# Patient Record
Sex: FEMALE | Race: WHITE | Employment: FULL TIME | URBAN - METROPOLITAN AREA
[De-identification: names, ages, dates, MRNs, and addresses within clinical notes are randomized per-mention and may not be internally consistent; named-entity substitution may affect disease eponyms.]

---

## 2018-06-05 ENCOUNTER — HOSPITAL ENCOUNTER (OUTPATIENT)
Dept: LAB | Age: 33
Discharge: HOME | End: 2018-06-05
Payer: COMMERCIAL

## 2018-06-05 DIAGNOSIS — O99.013 ANEMIA DURING PREGNANCY IN THIRD TRIMESTER: ICD-10-CM

## 2018-06-05 PROBLEM — Z3A.33 33 WEEKS GESTATION OF PREGNANCY: Status: ACTIVE | Noted: 2018-06-05

## 2018-06-05 PROBLEM — Z98.84 HISTORY OF GASTRIC BYPASS: Status: ACTIVE | Noted: 2018-06-05

## 2018-06-05 PROBLEM — D50.9 IRON DEFICIENCY ANEMIA: Status: ACTIVE | Noted: 2018-06-05

## 2018-06-05 LAB
ALBUMIN SERPL-MCNC: 3 G/DL (ref 3.5–5)
ALBUMIN/GLOB SERPL: 0.7 {RATIO} (ref 1.2–3.5)
ALP SERPL-CCNC: 132 U/L (ref 50–136)
ALT SERPL-CCNC: 23 U/L (ref 12–65)
ANION GAP SERPL CALC-SCNC: 6 MMOL/L (ref 7–16)
APPEARANCE UR: CLEAR
AST SERPL-CCNC: 19 U/L (ref 15–37)
BACTERIA URNS QL MICRO: NORMAL /HPF
BASOPHILS # BLD: 0 K/UL (ref 0–0.2)
BASOPHILS NFR BLD: 0 % (ref 0–2)
BILIRUB SERPL-MCNC: 0.1 MG/DL (ref 0.2–1.1)
BILIRUB UR QL: NEGATIVE
BUN SERPL-MCNC: 12 MG/DL (ref 6–23)
CALCIUM SERPL-MCNC: 8.7 MG/DL (ref 8.3–10.4)
CASTS URNS QL MICRO: 0 /LPF
CEA SERPL-MCNC: 1.8 NG/ML (ref 0–3)
CHLORIDE SERPL-SCNC: 104 MMOL/L (ref 98–107)
CO2 SERPL-SCNC: 25 MMOL/L (ref 21–32)
COLOR UR: YELLOW
CREAT SERPL-MCNC: 0.69 MG/DL (ref 0.6–1)
CRYSTALS URNS QL MICRO: 0 /LPF
DIFFERENTIAL METHOD BLD: ABNORMAL
EOSINOPHIL # BLD: 0.1 K/UL (ref 0–0.8)
EOSINOPHIL NFR BLD: 0 % (ref 0.5–7.8)
EPI CELLS #/AREA URNS HPF: NORMAL /HPF
ERYTHROCYTE [DISTWIDTH] IN BLOOD BY AUTOMATED COUNT: 18 % (ref 11.9–14.6)
ERYTHROCYTE [SEDIMENTATION RATE] IN BLOOD: 49 MM/HR (ref 0–20)
FERRITIN SERPL-MCNC: 6 NG/ML (ref 8–388)
FOLATE SERPL-MCNC: 39.1 NG/ML (ref 3.1–17.5)
GLOBULIN SER CALC-MCNC: 4.6 G/DL (ref 2.3–3.5)
GLUCOSE SERPL-MCNC: 70 MG/DL (ref 65–100)
GLUCOSE UR STRIP.AUTO-MCNC: NEGATIVE MG/DL
HCT VFR BLD AUTO: 32.3 % (ref 35.8–46.3)
HGB BLD-MCNC: 9.9 G/DL (ref 11.7–15.4)
HGB RETIC QN AUTO: 21 PG (ref 29–35)
HGB UR QL STRIP: ABNORMAL
IMM RETICS NFR: 28.2 % (ref 3–15.9)
IRON SATN MFR SERPL: 4 %
IRON SERPL-MCNC: 23 UG/DL (ref 35–150)
KAPPA LC FREE SER-MCNC: 22.49 MG/L (ref 3.3–19.4)
KAPPA LC FREE/LAMBDA FREE SER: 0.4 {RATIO} (ref 0.26–1.65)
KETONES UR QL STRIP.AUTO: NEGATIVE MG/DL
LAMBDA LC FREE SERPL-MCNC: 56.6 MG/L (ref 5.71–26.3)
LDH SERPL L TO P-CCNC: 182 U/L (ref 100–190)
LEUKOCYTE ESTERASE UR QL STRIP.AUTO: ABNORMAL
LYMPHOCYTES # BLD: 2 K/UL (ref 0.5–4.6)
LYMPHOCYTES NFR BLD: 18 % (ref 13–44)
MCH RBC QN AUTO: 22.3 PG (ref 26.1–32.9)
MCHC RBC AUTO-ENTMCNC: 30.7 G/DL (ref 31.4–35)
MCV RBC AUTO: 72.7 FL (ref 79.6–97.8)
MONOCYTES # BLD: 0.6 K/UL (ref 0.1–1.3)
MONOCYTES NFR BLD: 6 % (ref 4–12)
MUCOUS THREADS URNS QL MICRO: 0 /LPF
NEUTS SEG # BLD: 8.7 K/UL (ref 1.7–8.2)
NEUTS SEG NFR BLD: 76 % (ref 43–78)
NITRITE UR QL STRIP.AUTO: NEGATIVE
NRBC # BLD: 0 K/UL (ref 0–0.2)
PH UR STRIP: 7 [PH] (ref 5–9)
PLATELET # BLD AUTO: 332 K/UL (ref 150–450)
PMV BLD AUTO: 9.4 FL (ref 10.8–14.1)
POTASSIUM SERPL-SCNC: 3.8 MMOL/L (ref 3.5–5.1)
PROT SERPL-MCNC: 7.6 G/DL (ref 6.3–8.2)
PROT UR STRIP-MCNC: NEGATIVE MG/DL
RBC # BLD AUTO: 4.44 M/UL (ref 4.05–5.25)
RBC #/AREA URNS HPF: NORMAL /HPF
RETICS # AUTO: 0.08 M/UL (ref 0.03–0.1)
RETICS/RBC NFR AUTO: 1.7 % (ref 0.3–2)
SODIUM SERPL-SCNC: 135 MMOL/L (ref 136–145)
SP GR UR REFRACTOMETRY: 1.01 (ref 1–1.02)
T4 FREE SERPL-MCNC: 0.9 NG/DL (ref 0.78–1.46)
TIBC SERPL-MCNC: 646 UG/DL (ref 250–450)
TSH SERPL DL<=0.005 MIU/L-ACNC: 1.17 UIU/ML (ref 0.36–3.74)
URATE SERPL-MCNC: 4.3 MG/DL (ref 2.6–6)
UROBILINOGEN UR QL STRIP.AUTO: 0.2 EU/DL (ref 0.2–1)
VIT B12 SERPL-MCNC: 493 PG/ML (ref 193–986)
WBC # BLD AUTO: 11.5 K/UL (ref 4.3–11.1)
WBC URNS QL MICRO: NORMAL /HPF

## 2018-06-05 PROCEDURE — 81015 MICROSCOPIC EXAM OF URINE: CPT | Performed by: PEDIATRICS

## 2018-06-05 PROCEDURE — 84439 ASSAY OF FREE THYROXINE: CPT | Performed by: PEDIATRICS

## 2018-06-05 PROCEDURE — 84550 ASSAY OF BLOOD/URIC ACID: CPT | Performed by: PEDIATRICS

## 2018-06-05 PROCEDURE — 84165 PROTEIN E-PHORESIS SERUM: CPT | Performed by: PEDIATRICS

## 2018-06-05 PROCEDURE — 82746 ASSAY OF FOLIC ACID SERUM: CPT | Performed by: PEDIATRICS

## 2018-06-05 PROCEDURE — 85652 RBC SED RATE AUTOMATED: CPT | Performed by: PEDIATRICS

## 2018-06-05 PROCEDURE — 84238 ASSAY NONENDOCRINE RECEPTOR: CPT | Performed by: PEDIATRICS

## 2018-06-05 PROCEDURE — 86334 IMMUNOFIX E-PHORESIS SERUM: CPT | Performed by: PEDIATRICS

## 2018-06-05 PROCEDURE — 84443 ASSAY THYROID STIM HORMONE: CPT | Performed by: PEDIATRICS

## 2018-06-05 PROCEDURE — 36415 COLL VENOUS BLD VENIPUNCTURE: CPT | Performed by: PEDIATRICS

## 2018-06-05 PROCEDURE — 82728 ASSAY OF FERRITIN: CPT | Performed by: PEDIATRICS

## 2018-06-05 PROCEDURE — 83021 HEMOGLOBIN CHROMOTOGRAPHY: CPT | Performed by: PEDIATRICS

## 2018-06-05 PROCEDURE — 83883 ASSAY NEPHELOMETRY NOT SPEC: CPT | Performed by: PEDIATRICS

## 2018-06-05 PROCEDURE — 86038 ANTINUCLEAR ANTIBODIES: CPT | Performed by: PEDIATRICS

## 2018-06-05 PROCEDURE — 82607 VITAMIN B-12: CPT | Performed by: PEDIATRICS

## 2018-06-05 PROCEDURE — 80053 COMPREHEN METABOLIC PANEL: CPT | Performed by: PEDIATRICS

## 2018-06-05 PROCEDURE — 85025 COMPLETE CBC W/AUTO DIFF WBC: CPT | Performed by: PEDIATRICS

## 2018-06-05 PROCEDURE — 81003 URINALYSIS AUTO W/O SCOPE: CPT | Performed by: PEDIATRICS

## 2018-06-05 PROCEDURE — 83615 LACTATE (LD) (LDH) ENZYME: CPT | Performed by: PEDIATRICS

## 2018-06-05 PROCEDURE — 85046 RETICYTE/HGB CONCENTRATE: CPT | Performed by: PEDIATRICS

## 2018-06-05 PROCEDURE — 83540 ASSAY OF IRON: CPT | Performed by: PEDIATRICS

## 2018-06-05 PROCEDURE — 82378 CARCINOEMBRYONIC ANTIGEN: CPT | Performed by: PEDIATRICS

## 2018-06-05 PROCEDURE — 82785 ASSAY OF IGE: CPT | Performed by: PEDIATRICS

## 2018-06-06 LAB
ANA SER QL: NEGATIVE
DEPRECATED HGB OTHER BLD-IMP: 0 %
HGB A MFR BLD: 98.2 % (ref 96.4–98.8)
HGB A2 MFR BLD COLUMN CHROM: 1.8 % (ref 1.8–3.2)
HGB C MFR BLD: 0 %
HGB F MFR BLD: 0 % (ref 0–2)
HGB FRACT BLD-IMP: NORMAL
HGB S BLD QL SOLY: NEGATIVE
HGB S MFR BLD: 0 %
PERIPHERAL SMEAR,PSM: NORMAL
TRANSFERRIN SERPL-SCNC: 63.8 NMOL/L (ref 12.2–27.3)

## 2018-06-07 LAB
ALBUMIN SERPL ELPH-MCNC: 3.35 G/DL (ref 3.2–5.6)
ALBUMIN/GLOB SERPL: 1 {RATIO}
ALPHA1 GLOB SERPL ELPH-MCNC: 0.42 G/DL (ref 0.1–0.4)
ALPHA2 GLOB SERPL ELPH-MCNC: 0.84 G/DL (ref 0.4–1.2)
B-GLOBULIN SERPL QL ELPH: 1.39 G/DL (ref 0.6–1.3)
GAMMA GLOB MFR SERPL ELPH: 0.8 G/DL (ref 0.5–1.6)
IGA SERPL-MCNC: 321 MG/DL (ref 85–499)
IGG SERPL-MCNC: 852 MG/DL (ref 610–1616)
IGM SERPL-MCNC: 129 MG/DL (ref 35–242)
M PROTEIN SERPL ELPH-MCNC: ABNORMAL G/DL
PROT PATTERN SERPL ELPH-IMP: ABNORMAL
PROT PATTERN SPEC IFE-IMP: ABNORMAL
PROT SERPL-MCNC: 6.8 G/DL (ref 6.3–8.2)

## 2018-06-08 ENCOUNTER — HOSPITAL ENCOUNTER (OUTPATIENT)
Dept: INFUSION THERAPY | Age: 33
Discharge: HOME | End: 2018-06-08
Payer: COMMERCIAL

## 2018-06-08 ENCOUNTER — DOCUMENTATION ONLY (OUTPATIENT)
Dept: HEMATOLOGY | Age: 33
End: 2018-06-08

## 2018-06-08 VITALS
HEART RATE: 87 BPM | TEMPERATURE: 98.1 F | DIASTOLIC BLOOD PRESSURE: 88 MMHG | RESPIRATION RATE: 16 BRPM | SYSTOLIC BLOOD PRESSURE: 139 MMHG | OXYGEN SATURATION: 98 %

## 2018-06-08 DIAGNOSIS — Z3A.33 33 WEEKS GESTATION OF PREGNANCY: ICD-10-CM

## 2018-06-08 DIAGNOSIS — D50.9 IRON DEFICIENCY ANEMIA, UNSPECIFIED IRON DEFICIENCY ANEMIA TYPE: ICD-10-CM

## 2018-06-08 LAB — IGE SERPL-ACNC: 24 IU/ML (ref 0–100)

## 2018-06-08 PROCEDURE — 96365 THER/PROPH/DIAG IV INF INIT: CPT

## 2018-06-08 PROCEDURE — 74011250636 HC RX REV CODE- 250/636: Performed by: PEDIATRICS

## 2018-06-08 RX ADMIN — SODIUM CHLORIDE 500 ML: 900 INJECTION, SOLUTION INTRAVENOUS at 14:26

## 2018-06-08 RX ADMIN — FERRIC CARBOXYMALTOSE INJECTION 750 MG: 50 INJECTION, SOLUTION INTRAVENOUS at 14:28

## 2018-06-08 NOTE — PROGRESS NOTES
Arrived to the [facility]. injectafer completed. Patient tolerated well. Any issues or concerns during appointment: no.  Patient aware of next infusion appointment on 6/15 (date) at 2 (time). Discharged home.

## 2018-06-08 NOTE — PROGRESS NOTES
I spoke with Ms. Poppy Cobb regarding the [facility] [employer].  I went over the benefits of the program, the enrollment process, and back end billing. She agreed to allow me to sign her up for the program, and she signed the LPOA document allowing a [address] [place] [address] representative to do just that enrollment. LPOA will be scanned into chart along with the enrollment information.

## 2018-06-15 ENCOUNTER — HOSPITAL ENCOUNTER (OUTPATIENT)
Dept: INFUSION THERAPY | Age: 33
Discharge: HOME | End: 2018-06-15
Payer: COMMERCIAL

## 2018-06-15 VITALS
RESPIRATION RATE: 18 BRPM | TEMPERATURE: 97.6 F | WEIGHT: 202.2 LBS | DIASTOLIC BLOOD PRESSURE: 77 MMHG | HEART RATE: 84 BPM | BODY MASS INDEX: 32.64 KG/M2 | OXYGEN SATURATION: 97 % | SYSTOLIC BLOOD PRESSURE: 125 MMHG

## 2018-06-15 DIAGNOSIS — Z3A.33 33 WEEKS GESTATION OF PREGNANCY: ICD-10-CM

## 2018-06-15 DIAGNOSIS — D50.9 IRON DEFICIENCY ANEMIA, UNSPECIFIED IRON DEFICIENCY ANEMIA TYPE: ICD-10-CM

## 2018-06-15 PROCEDURE — 74011250636 HC RX REV CODE- 250/636: Performed by: PEDIATRICS

## 2018-06-15 PROCEDURE — 96365 THER/PROPH/DIAG IV INF INIT: CPT

## 2018-06-15 RX ORDER — SODIUM CHLORIDE 0.9 % (FLUSH) 0.9 %
10 SYRINGE (ML) INJECTION AS NEEDED
Status: ACTIVE | OUTPATIENT
Start: 2018-06-15 | End: 2018-06-16

## 2018-06-15 RX ADMIN — SODIUM CHLORIDE 500 ML: 900 INJECTION, SOLUTION INTRAVENOUS at 15:10

## 2018-06-15 RX ADMIN — FERRIC CARBOXYMALTOSE INJECTION 750 MG: 50 INJECTION, SOLUTION INTRAVENOUS at 14:48

## 2018-06-15 RX ADMIN — Medication 10 ML: at 15:38

## 2018-06-15 NOTE — PROGRESS NOTES
Arrived to the [facility]. Injectafer completed. Patient tolerated well. Any issues or concerns during appointment: none. Given educational information sheet on Injectafer and Preeclampsia. Recommended monitoring BP at home. Patient aware of next MD appointment on 10/18/18 at [phone]. Discharged ambulatory.

## 2018-06-23 ENCOUNTER — HOSPITAL ENCOUNTER (EMERGENCY)
Age: 33
Discharge: HOME | End: 2018-06-24
Attending: OBSTETRICS & GYNECOLOGY | Admitting: OBSTETRICS & GYNECOLOGY
Payer: COMMERCIAL

## 2018-06-23 DIAGNOSIS — F41.9 ANXIETY: ICD-10-CM

## 2018-06-23 DIAGNOSIS — G44.201 INTRACTABLE TENSION-TYPE HEADACHE, UNSPECIFIED CHRONICITY PATTERN: Primary | ICD-10-CM

## 2018-06-23 PROBLEM — O16.3 HYPERTENSION AFFECTING PREGNANCY IN THIRD TRIMESTER: Status: ACTIVE | Noted: 2018-06-23

## 2018-06-23 LAB
ALBUMIN SERPL-MCNC: 2.6 G/DL (ref 3.5–5)
ALBUMIN/GLOB SERPL: 0.7 {RATIO} (ref 1.2–3.5)
ALP SERPL-CCNC: 138 U/L (ref 50–136)
ALT SERPL-CCNC: 30 U/L (ref 12–65)
ANION GAP SERPL CALC-SCNC: 12 MMOL/L (ref 7–16)
AST SERPL-CCNC: 26 U/L (ref 15–37)
BASOPHILS # BLD: 0 K/UL (ref 0–0.2)
BASOPHILS NFR BLD: 0 % (ref 0–2)
BILIRUB SERPL-MCNC: 0.2 MG/DL (ref 0.2–1.1)
BUN SERPL-MCNC: 8 MG/DL (ref 6–23)
CALCIUM SERPL-MCNC: 8 MG/DL (ref 8.3–10.4)
CHLORIDE SERPL-SCNC: 104 MMOL/L (ref 98–107)
CO2 SERPL-SCNC: 20 MMOL/L (ref 21–32)
CREAT SERPL-MCNC: 0.66 MG/DL (ref 0.6–1)
CREAT UR-MCNC: 63.63 MG/DL
DIFFERENTIAL METHOD BLD: ABNORMAL
EOSINOPHIL # BLD: 0 K/UL (ref 0–0.8)
EOSINOPHIL NFR BLD: 0 % (ref 0.5–7.8)
ERYTHROCYTE [DISTWIDTH] IN BLOOD BY AUTOMATED COUNT: 27.1 % (ref 11.9–14.6)
GLOBULIN SER CALC-MCNC: 3.6 G/DL (ref 2.3–3.5)
GLUCOSE SERPL-MCNC: 80 MG/DL (ref 65–100)
HCT VFR BLD AUTO: 33.3 % (ref 35.8–46.3)
HGB BLD-MCNC: 10.4 G/DL (ref 11.7–15.4)
IMM GRANULOCYTES # BLD: 0 K/UL (ref 0–0.5)
IMM GRANULOCYTES NFR BLD AUTO: 0 % (ref 0–5)
LDH SERPL L TO P-CCNC: 178 U/L (ref 100–190)
LYMPHOCYTES # BLD: 1.7 K/UL (ref 0.5–4.6)
LYMPHOCYTES NFR BLD: 15 % (ref 13–44)
MCH RBC QN AUTO: 24.2 PG (ref 26.1–32.9)
MCHC RBC AUTO-ENTMCNC: 31.2 G/DL (ref 31.4–35)
MCV RBC AUTO: 77.6 FL (ref 79.6–97.8)
MONOCYTES # BLD: 0.6 K/UL (ref 0.1–1.3)
MONOCYTES NFR BLD: 5 % (ref 4–12)
NEUTS SEG # BLD: 8.9 K/UL (ref 1.7–8.2)
NEUTS SEG NFR BLD: 80 % (ref 43–78)
PLATELET # BLD AUTO: 264 K/UL (ref 150–450)
PMV BLD AUTO: 10.7 FL (ref 10.8–14.1)
POTASSIUM SERPL-SCNC: 3.9 MMOL/L (ref 3.5–5.1)
PROT SERPL-MCNC: 6.2 G/DL (ref 6.3–8.2)
PROT UR-MCNC: 11 MG/DL
PROT/CREAT UR-RTO: 0.2
RBC # BLD AUTO: 4.29 M/UL (ref 4.05–5.25)
SODIUM SERPL-SCNC: 136 MMOL/L (ref 136–145)
URATE SERPL-MCNC: 5.5 MG/DL (ref 2.6–6)
WBC # BLD AUTO: 11.3 K/UL (ref 4.3–11.1)

## 2018-06-23 PROCEDURE — 74011250637 HC RX REV CODE- 250/637: Performed by: OBSTETRICS & GYNECOLOGY

## 2018-06-23 PROCEDURE — 80053 COMPREHEN METABOLIC PANEL: CPT | Performed by: OBSTETRICS & GYNECOLOGY

## 2018-06-23 PROCEDURE — 75810000275 HC EMERGENCY DEPT VISIT NO LEVEL OF CARE: Performed by: EMERGENCY MEDICINE

## 2018-06-23 PROCEDURE — 83615 LACTATE (LD) (LDH) ENZYME: CPT | Performed by: OBSTETRICS & GYNECOLOGY

## 2018-06-23 PROCEDURE — 84550 ASSAY OF BLOOD/URIC ACID: CPT | Performed by: OBSTETRICS & GYNECOLOGY

## 2018-06-23 PROCEDURE — 74011250636 HC RX REV CODE- 250/636: Performed by: OBSTETRICS & GYNECOLOGY

## 2018-06-23 PROCEDURE — 84156 ASSAY OF PROTEIN URINE: CPT | Performed by: OBSTETRICS & GYNECOLOGY

## 2018-06-23 PROCEDURE — 85025 COMPLETE CBC W/AUTO DIFF WBC: CPT | Performed by: OBSTETRICS & GYNECOLOGY

## 2018-06-23 PROCEDURE — 99285 EMERGENCY DEPT VISIT HI MDM: CPT

## 2018-06-23 PROCEDURE — 59025 FETAL NON-STRESS TEST: CPT

## 2018-06-23 RX ORDER — LORAZEPAM 1 MG/1
1 TABLET ORAL ONCE
Status: COMPLETED | OUTPATIENT
Start: 2018-06-23 | End: 2018-06-23

## 2018-06-23 RX ORDER — BUTALBITAL, ASPIRIN, AND CAFFEINE 325; 50; 40 MG/1; MG/1; MG/1
1 CAPSULE ORAL
COMMUNITY
End: 2018-12-17

## 2018-06-23 RX ORDER — PROMETHAZINE HYDROCHLORIDE 25 MG/1
25 TABLET ORAL
Status: DISCONTINUED | OUTPATIENT
Start: 2018-06-23 | End: 2018-06-24 | Stop reason: HOSPADM

## 2018-06-23 RX ORDER — DIPHENHYDRAMINE HCL 25 MG
25 CAPSULE ORAL
Status: DISCONTINUED | OUTPATIENT
Start: 2018-06-23 | End: 2018-06-24 | Stop reason: HOSPADM

## 2018-06-23 RX ORDER — HYDROMORPHONE HYDROCHLORIDE 1 MG/ML
1 INJECTION, SOLUTION INTRAMUSCULAR; INTRAVENOUS; SUBCUTANEOUS ONCE
Status: ACTIVE | OUTPATIENT
Start: 2018-06-23 | End: 2018-06-24

## 2018-06-23 RX ORDER — OXYCODONE AND ACETAMINOPHEN 7.5; 325 MG/1; MG/1
1 TABLET ORAL
Status: DISCONTINUED | OUTPATIENT
Start: 2018-06-23 | End: 2018-06-24 | Stop reason: HOSPADM

## 2018-06-23 RX ORDER — BETAMETHASONE SODIUM PHOSPHATE AND BETAMETHASONE ACETATE 3; 3 MG/ML; MG/ML
12 INJECTION, SUSPENSION INTRA-ARTICULAR; INTRALESIONAL; INTRAMUSCULAR; SOFT TISSUE EVERY 24 HOURS
Status: COMPLETED | OUTPATIENT
Start: 2018-06-23 | End: 2018-06-24

## 2018-06-23 RX ADMIN — BETAMETHASONE SODIUM PHOSPHATE AND BETAMETHASONE ACETATE 12 MG: 3; 3 INJECTION, SUSPENSION INTRA-ARTICULAR; INTRALESIONAL; INTRAMUSCULAR at 17:12

## 2018-06-23 RX ADMIN — OXYCODONE HYDROCHLORIDE AND ACETAMINOPHEN 1 TABLET: 7.5; 325 TABLET ORAL at 19:50

## 2018-06-23 RX ADMIN — LORAZEPAM 1 MG: 1 TABLET ORAL at 19:50

## 2018-06-23 NOTE — IP AVS SNAPSHOT
[address] 
[phone] Patient: Ish Sandoval MRN: [id number] A check vivienne indicates which time of day the medication should be taken. My Medications START taking these medications Instructions Each Dose to Equal  
 Morning Noon Evening Bedtime LORazepam 1 mg tablet Commonly known as:  ATIVAN Your last dose was: Your next dose is: Take 0.5-1 Tabs by mouth every twelve (12) hours as needed for Anxiety. Max Daily Amount: 2 mg. 0.5-1 mg  
    
   
   
   
  
 oxyCODONE-acetaminophen 7.5-325 mg per tablet Commonly known as:  PERCOCET 7.5 Your last dose was: Your next dose is: Take 1-2 Tabs by mouth every six (6) hours as needed. Max Daily Amount: 8 Tabs. 1-2 Tab CONTINUE taking these medications Instructions Each Dose to Equal  
 Morning Noon Evening Bedtime  
 butalbital-aspirin-caffeine capsule Commonly known as:  Daphane Broadwater Your last dose was: Your next dose is: Take 1 Cap by mouth every four (4) hours as needed for Pain. Indications: TENSION-TYPE HEADACHE  
 1 Cap  
    
   
   
   
  
 glucosamine-chondroitin 750-600 mg Tab Your last dose was: Your next dose is: Take  by mouth. Iron 325 mg (65 mg iron) tablet Generic drug:  ferrous sulfate Your last dose was: Your next dose is: Take  by mouth Daily (before breakfast). PHENERGAN PO Your last dose was: Your next dose is: Take 25 mg by mouth. 25 mg  
    
   
   
   
  
 VITAMIN B-12 1,000 mcg tablet Generic drug:  cyanocobalamin Your last dose was: Your next dose is: Take 1,000 mcg by mouth daily. 1000 mcg ZOLOFT 50 mg tablet Generic drug:  sertraline Your last dose was: Your next dose is: Take  by mouth daily. Where to Get Your Medications Information on where to get these meds will be given to you by the nurse or doctor. ! Ask your nurse or doctor about these medications LORazepam 1 mg tablet  
 oxyCODONE-acetaminophen 7.5-325 mg per tablet

## 2018-06-23 NOTE — IP AVS SNAPSHOT
Ewelina Galindo Teran 58 [address] [facility] 
[phone] Patient: Micha Lynn MRN: [id number] About your hospitalization You were admitted on:  N/A You last received care in the:  [facility] [place] [facility] You were discharged on:  June 24, 2018 Why you were hospitalized Your primary diagnosis was:  Hypertension Affecting Pregnancy In Third Trimester Follow-up Information Follow up With Details Comments Contact Info JEANETTE Taylor   [id number] [facility], [place] [address] 
[phone] Discharge Orders None A check vivienne indicates which time of day the medication should be taken. My Medications START taking these medications Instructions Each Dose to Equal  
 Morning Noon Evening Bedtime LORazepam 1 mg tablet Commonly known as:  ATIVAN Your last dose was: Your next dose is: Take 0.5-1 Tabs by mouth every twelve (12) hours as needed for Anxiety. Max Daily Amount: 2 mg. 0.5-1 mg  
    
   
   
   
  
 oxyCODONE-acetaminophen 7.5-325 mg per tablet Commonly known as:  PERCOCET 7.5 Your last dose was: Your next dose is: Take 1-2 Tabs by mouth every six (6) hours as needed. Max Daily Amount: 8 Tabs. 1-2 Tab CONTINUE taking these medications Instructions Each Dose to Equal  
 Morning Noon Evening Bedtime  
 butalbital-aspirin-caffeine capsule Commonly known as:  Leodis Beech Your last dose was: Your next dose is: Take 1 Cap by mouth every four (4) hours as needed for Pain. Indications: TENSION-TYPE HEADACHE  
 1 Cap  
    
   
   
   
  
 glucosamine-chondroitin 750-600 mg Tab Your last dose was: Your next dose is: Take  by mouth. Iron 325 mg (65 mg iron) tablet Generic drug:  ferrous sulfate Your last dose was: Your next dose is: Take  by mouth Daily (before breakfast). PHENERGAN PO Your last dose was: Your next dose is: Take 25 mg by mouth. 25 mg  
    
   
   
   
  
 VITAMIN B-12 1,000 mcg tablet Generic drug:  cyanocobalamin Your last dose was: Your next dose is: Take 1,000 mcg by mouth daily. 1000 mcg ZOLOFT 50 mg tablet Generic drug:  sertraline Your last dose was: Your next dose is: Take  by mouth daily. Where to Get Your Medications Information on where to get these meds will be given to you by the nurse or doctor. ! Ask your nurse or doctor about these medications LORazepam 1 mg tablet  
 oxyCODONE-acetaminophen 7.5-325 mg per tablet Opioid Education Prescription Opioids: What You Need to Know: 
 
Prescription opioids can be used to help relieve moderate-to-severe pain and are often prescribed following a surgery or injury, or for certain health conditions. These medications can be an important part of treatment but also come with serious risks. Opioids are strong pain medicines. Examples include hydrocodone, oxycodone, fentanyl, and morphine. Heroin is an example of an illegal opioid. It is important to work with your health care provider to make sure you are getting the safest, most effective care. WHAT ARE THE RISKS AND SIDE EFFECTS OF OPIOID USE? Prescription opioids carry serious risks of addiction and overdose, especially with prolonged use. An opioid overdose, often marked by slow breathing, can cause sudden death. The use of prescription opioids can have a number of side effects as well, even when taken as directed. · Tolerance-meaning you might need to take more of a medication for the same pain relief · Physical dependence-meaning you have symptoms of withdrawal when the medication is stopped. Withdrawal symptoms can include nausea, sweating, chills, diarrhea, stomach cramps, and muscle aches. Withdrawal can last up to several weeks, depending on which drug you took and how long you took it. · Increased sensitivity to pain · Constipation · Nausea, vomiting, and dry mouth · Sleepiness and dizziness · Confusion · Depression · Low levels of testosterone that can result in lower sex drive, energy, and strength · Itching and sweating RISKS ARE GREATER WITH:      
· History of drug misuse, substance use disorder, or overdose · Mental health conditions (such as depression or anxiety) · Sleep apnea · Older age (72 years or older) · Pregnancy Avoid alcohol while taking prescription opioids. Also, unless specifically advised by your health care provider, medications to avoid include: · Benzodiazepines (such as Xanax or Valium) · Muscle relaxants (such as Soma or Flexeril) · Hypnotics (such as Ambien or Lunesta) · Other prescription opioids KNOW YOUR OPTIONS Talk to your health care provider about ways to manage your pain that don't involve prescription opioids. Some of these options may actually work better and have fewer risks and side effects. Options may include: 
· Pain relievers such as acetaminophen, ibuprofen, and naproxen · Some medications that are also used for depression or seizures · Physical therapy and exercise · Counseling to help patients learn how to cope better with triggers of pain and stress. · Application of heat or cold compress · Massage therapy · Relaxation techniques Be Informed Make sure you know the name of your medication, how much and how often to take it, and its potential risks & side effects. IF YOU ARE PRESCRIBED OPIOIDS FOR PAIN: 
· Never take opioids in greater amounts or more often than prescribed. Remember the goal is not to be pain-free but to manage your pain at a tolerable level. · Follow up with your primary care provider to: · Work together to create a plan on how to manage your pain. · Talk about ways to help manage your pain that don't involve prescription opioids. · Talk about any and all concerns and side effects. · Help prevent misuse and abuse. · Never sell or share prescription opioids · Help prevent misuse and abuse. · Store prescription opioids in a secure place and out of reach of others (this may include visitors, children, friends, and family). · Safely dispose of unused/unwanted prescription opioids: Find your community drug take-back program or your pharmacy mail-back program, or flush them down the toilet, following guidance from the Food and Drug Administration (www.fda.gov/Drugs/ResourcesForYou). · Visit www.cdc.gov/drugoverdose to learn about the risks of opioid abuse and overdose. · If you believe you may be struggling with addiction, tell your health care provider and ask for guidance or call [employer] at 3-059-784-WJDS. Discharge Instructions Follow up with appt Monday,  with Dr Tana Rush. Rx for ativan and percocet given. Take as instructed. Do not drive while taking either of these medications. Pregnancy Precautions: Care Instructions Your Care Instructions There is no sure way to prevent labor before your due date ( labor) or to prevent most other pregnancy problems. But there are things you can do to increase your chances of a healthy pregnancy. Go to your appointments, follow your doctor's advice, and take good care of yourself. Eat well, and exercise (if your doctor agrees). And make sure to drink plenty of water. Follow-up care is a key part of your treatment and safety.  Be sure to make and go to all appointments, and call your doctor if you are having problems. It's also a good idea to know your test results and keep a list of the medicines you take. How can you care for yourself at home? · Make sure you go to your prenatal appointments. At each visit, your doctor will check your blood pressure. Your doctor will also check to see if you have protein in your urine. High blood pressure and protein in urine are signs of preeclampsia. This condition can be dangerous for you and your baby. · Drink plenty of fluids, enough so that your urine is light yellow or clear like water. Dehydration can cause contractions. If you have kidney, heart, or liver disease and have to limit fluids, talk with your doctor before you increase the amount of fluids you drink. · Tell your doctor right away if you notice any symptoms of an infection, such as: ¨ Burning when you urinate. ¨ A foul-smelling discharge from your vagina. ¨ Vaginal itching. ¨ Unexplained fever. ¨ Unusual pain or soreness in your uterus or lower belly. · Eat a balanced diet. Include plenty of foods that are high in calcium and iron. ¨ Foods high in calcium include milk, cheese, yogurt, almonds, and broccoli. ¨ Foods high in iron include red meat, shellfish, poultry, eggs, beans, raisins, whole-grain bread, and leafy green vegetables. · Do not smoke. If you need help quitting, talk to your doctor about stop-smoking programs and medicines. These can increase your chances of quitting for good. · Do not drink alcohol or use illegal drugs. · Follow your doctor's directions about activity. Your doctor will let you know how much, if any, exercise you can do. · Ask your doctor if you can have sex. If you are at risk for early labor, your doctor may ask you to not have sex. · Take care to prevent falls. During pregnancy, your joints are loose, and your balance is off.  Sports such as bicycling, skiing, or in-line skating can increase your risk of falling. And don't ride horses or motorcycles, dive, water ski, scuba dive, or parachute jump while you are pregnant. · Avoid getting very hot. Do not use saunas or hot tubs. Avoid staying out in the sun in hot weather for long periods. Take acetaminophen (Tylenol) to lower a high fever. · Do not take any over-the-counter or herbal medicines or supplements without talking to your doctor or pharmacist first. 
When should you call for help? Call 911 anytime you think you may need emergency care. For example, call if: 
? · You passed out (lost consciousness). ? · You have severe vaginal bleeding. ? · You have severe pain in your belly or pelvis. ? · You have had fluid gushing or leaking from your vagina and you know or think the umbilical cord is bulging into your vagina. If this happens, immediately get down on your knees so your rear end (buttocks) is higher than your head. This will decrease the pressure on the cord until help arrives. ?Call your doctor now or seek immediate medical care if: 
? · You have signs of preeclampsia, such as: 
¨ Sudden swelling of your face, hands, or feet. ¨ New vision problems (such as dimness or blurring). ¨ A severe headache. ? · You have any vaginal bleeding. ? · You have belly pain or cramping. ? · You have a fever. ? · You have had regular contractions (with or without pain) for an hour. This means that you have 8 or more within 1 hour or 4 or more in 20 minutes after you change your position and drink fluids. ? · You have a sudden release of fluid from your vagina. ? · You have low back pain or pelvic pressure that does not go away. ? · You notice that your baby has stopped moving or is moving much less than normal. ? Watch closely for changes in your health, and be sure to contact your doctor if you have any problems. Where can you learn more? Go to http://kyle-gabrielle.info/. Enter [id number] in the search box to learn more about \"Pregnancy Precautions: Care Instructions. \" Current as of: March 16, 2017 Content Version: 11.4 © 0037-8579 [employer]. Care instructions adapted under license by [employer] (which disclaims liability or warranty for this information). If you have questions about a medical condition or this instruction, always ask your healthcare professional. [place] [address] any warranty or liability for your use of this information. Counting Your Baby's Kicks: Care Instructions Your Care Instructions Counting your baby's kicks is one way your doctor can tell that your baby is healthy. Most women-especially in a first pregnancy-feel their baby move for the first time between 16 and 22 weeks. The movement may feel like flutters rather than kicks. Your baby may move more at certain times of the day. When you are active, you may notice less kicking than when you are resting. At your prenatal visits, your doctor will ask whether the baby is active. In your last trimester, your doctor may ask you to count the number of times you feel your baby move. Follow-up care is a key part of your treatment and safety. Be sure to make and go to all appointments, and call your doctor if you are having problems. It's also a good idea to know your test results and keep a list of the medicines you take. How do you count fetal kicks? · A common method of checking your baby's movement is to count the number of kicks or moves you feel in 1 hour. Ten movements (such as kicks, flutters, or rolls) in 1 hour are normal. Some doctors suggest that you count in the morning until you get to 10 movements. Then you can quit for that day and start again the next day. · Pick your baby's most active time of day to count. This may be any time from morning to evening. · If you do not feel 10 movements in an hour, your baby may be sleeping. Wait for the next hour and count again. When should you call for help? Call your doctor now or seek immediate medical care if: 
? · You noticed that your baby has stopped moving or is moving much less than normal. ? Watch closely for changes in your health, and be sure to contact your doctor if you have any problems. Where can you learn more? Go to http://kyle-gabrielle.info/. Enter R462 in the search box to learn more about \"Counting Your Baby's Kicks: Care Instructions. \" Current as of: March 16, 2017 Content Version: 11.4 © 0903-4286 [employer]. Care instructions adapted under license by [employer]Wings[employer] (which disclaims liability or warranty for this information). If you have questions about a medical condition or this instruction, always ask your healthcare professional. [address] any warranty or liability for your use of this information. High Blood Pressure in Pregnancy: Care Instructions Your Care Instructions High blood pressure (hypertension) means that the force of blood against your artery walls is too strong. Mild high blood pressure during pregnancy is not usually dangerous. Your doctor will probably just want to watch you closely. But when blood pressure is very high, it can reduce oxygen to your baby. This can affect how well your baby grows. High blood pressure also means that you are at higher risk for: · Preeclampsia. This is a problem that includes high blood pressure and damage to your liver or kidneys. It can also reduce how much oxygen your baby gets. In some cases, it leads to eclampsia. Eclampsia causes seizures. · Placental abruption. This is a problem when the placenta separates from the uterus before birth. It prevents the baby from getting enough oxygen and nutrients. Sometimes it can cause death for the baby and the mother.  
To prevent problems for you or your baby, you will have to check your blood pressure often. You will do this until after your baby is born. If your blood pressure rises suddenly or is very high during your pregnancy, your doctor may prescribe medicines. They can usually control blood pressure. If your blood pressure affects your or your baby's health, your doctor may need to deliver your baby early. After your baby is born, your blood pressure will probably improve. But sometimes blood pressure problems continue after birth. Follow-up care is a key part of your treatment and safety. Be sure to make and go to all appointments, and call your doctor if you are having problems. It's also a good idea to know your test results and keep a list of the medicines you take. How can you care for yourself at home? · Take and write down your blood pressure at home if your doctor tells you to. · Take your medicines exactly as prescribed. Call your doctor if you think you are having a problem with your medicine. · Do not smoke. If you need help quitting, talk to your doctor about stop-smoking programs and medicines. These can increase your chances of quitting for good. · Do not gain too much weight during your pregnancy. Talk to your doctor about how much weight gain is healthy. · Eat a healthy diet. · If your doctor says it's okay, get regular exercise. Walking or swimming several times a week can be healthy for you and your baby. · Reduce stress, and find time to relax. This is very important if you continue to work or have a busy schedule. It's also important if you have small children at home. When should you call for help? Call 911 anytime you think you may need emergency care. For example, call if: 
? · You passed out (lost consciousness). ? · You have a seizure. ?Call your doctor now or seek immediate medical care if: 
? · You have symptoms of preeclampsia, such as: 
¨ Sudden swelling of your face, hands, or feet. ¨ New vision problems (such as dimness or blurring). ¨ A severe headache. ? · Your blood pressure is higher than it should be or rises suddenly. ? · You have new nausea or vomiting. ? · You think that you are in labor. ? · You have pain in your belly or pelvis. ? Watch closely for changes in your health, and be sure to contact your doctor if: 
? · You gain weight rapidly. Where can you learn more? Go to http://kyle-gabrielle.info/. Enter [phone] in the search box to learn more about \"High Blood Pressure in Pregnancy: Care Instructions. \" Current as of: March 16, 2017 Content Version: 11.4 © 8478-0405 Viewpoints. Care instructions adapted under license by [employer] (which disclaims liability or warranty for this information). If you have questions about a medical condition or this instruction, always ask your healthcare professional. [place] [address] any warranty or liability for your use of this information. Week 37 of Your Pregnancy: Care Instructions Your Care Instructions You are near the end of your pregnancy-and you're probably pretty uncomfortable. It may be harder to walk around. Lying down probably isn't comfortable either. You may have trouble getting to sleep or staying asleep. Most women deliver their babies between 40 and 41 weeks. This is a good time to think about packing a bag for the hospital with items you'll need. Then you'll be ready when labor starts. Follow-up care is a key part of your treatment and safety. Be sure to make and go to all appointments, and call your doctor if you are having problems. It's also a good idea to know your test results and keep a list of the medicines you take. How can you care for yourself at home? Learn about breastfeeding · Breastfeeding is best for your baby and good for you. · Breast milk has antibodies to help your baby fight infections. · Mothers who breastfeed often lose weight faster, because making milk burns calories. · Learning the best ways to hold your baby will make breastfeeding easier. · Let your partner bathe and diaper the baby to keep your partner from feeling left out. Snuggle together when you breastfeed. · You may want to learn how to use a breast pump and store your milk. · If you choose to bottle feed, make the feeding feel like breastfeeding so you can bond with your baby. Always hold your baby and the bottle. Do not prop bottles or let your baby fall asleep with a bottle. Learn about crying · It is common for babies to cry for 1 to 3 hours a day. Some cry more, some cry less. · Babies don't cry to make you upset or because you are a bad parent. · Crying is how your baby communicates. Your baby may be hungry; have gas; need a diaper change; or feel cold, warm, tired, lonely, or tense. Sometimes babies cry for unknown reasons. · If you respond to your baby's needs, he or she will learn to trust you. · Try to stay calm when your baby cries. Your baby may get more upset if he or she senses that you are upset. Know how to care for your  · Your baby's umbilical cord stump will drop off on its own, usually between 1 and 2 weeks. To care for your baby's umbilical cord area: ¨ Clean the area at the bottom of the cord 2 or 3 times a day. ¨ Pay special attention to the area where the cord attaches to the skin. ¨ Keep the diaper folded below the cord. ¨ Use a damp washcloth or cotton ball to sponge bathe your baby until the stump has come off. · Your baby's first dark stool is called meconium. After the meconium is passed, your baby will develop his or her own bowel pattern. ¨ Some babies, especially  babies, have several bowel movements a day. Others have one or two a day, or one every 2 to 3 days. ¨  babies often have loose, yellow stools. Formula-fed babies have more formed stools.  
¨ If your baby's stools look like little pellets, he or she is constipated. After 2 days of constipation, call your baby's doctor. · If your baby will be circumcised, you can care for him at home. ¨ Gently rinse his penis with warm water after every diaper change. Do not try to remove the film that forms on the penis. This film will go away on its own. Pat dry. ¨ Put petroleum ointment, such as Vaseline, on the area of the diaper that will touch your baby's penis. This will keep the diaper from sticking to your baby. ¨ Ask the doctor about giving your baby acetaminophen (Tylenol) for pain. Where can you learn more? Go to http://kyle-gabrielle.info/. Enter [phone] [id number] in the search box to learn more about \"Week 37 of Your Pregnancy: Care Instructions. \" Current as of: March 16, 2017 Content Version: 11.4 © 9154-7560 [employer]. Care instructions adapted under license by [employer] (which disclaims liability or warranty for this information). If you have questions about a medical condition or this instruction, always ask your healthcare professional. [place] [address] any warranty or liability for your use of this information. DearJane Announcement We are excited to announce that we are making your provider's discharge notes available to you in DearJane. You will see these notes when they are completed and signed by the physician that discharged you from your recent hospital stay. If you have any questions or concerns about any information you see in DearJane, please call the Health Information Department where you were seen or reach out to your Primary Care Provider for more information about your plan of care. Introducing [facility]! Krish Bauer introduces DearJane patient portal. Now you can access parts of your medical record, email your doctor's office, and request medication refills online. 1. In your internet browser, go to [email or web address] 2. Click on the First Time User? Click Here link in the Sign In box. You will see the New Member Sign Up page. 3. Enter your FlyReadyJet Access Code exactly as it appears below. You will not need to use this code after youve completed the sign-up process. If you do not sign up before the expiration date, you must request a new code. · FlyReadyJet Access Code: [email or web address] Expires: 8/28/2018 12:14 PM 
 
4. Enter the last four digits of your Social Security Number (xxxx) and Date of Birth (mm/dd/yyyy) as indicated and click Submit. You will be taken to the next sign-up page. 5. Create a FlyReadyJet ID. This will be your FlyReadyJet login ID and cannot be changed, so think of one that is secure and easy to remember. 6. Create a FlyReadyJet password. You can change your password at any time. 7. Enter your Password Reset Question and Answer. This can be used at a later time if you forget your password. 8. Enter your e-mail address. You will receive e-mail notification when new information is available in [address] 9. Click Sign Up. You can now view and download portions of your medical record. 10. Click the Download Summary menu link to download a portable copy of your medical information. If you have questions, please visit the Frequently Asked Questions section of the FlyReadyJet website. Remember, FlyReadyJet is NOT to be used for urgent needs. For medical emergencies, dial 911. Now available from your iPhone and Android! Introducing Robson Marina As a [facility] patient, I wanted to make you aware of our electronic visit tool called Robson Marina. [facility] 24/7 allows you to connect within minutes with a medical provider 24 hours a day, seven days a week via a mobile device or tablet or logging into a secure website from your computer. You can access Robson Marina from anywhere in the [place].  
 
A virtual visit might be right for you when you have a simple condition and feel like you just dont want to get out of bed, or cant get away from work for an appointment, when your regular [address] [place] [address] provider is not available (evenings, weekends or holidays), or when youre out of town and need minor care. Electronic visits cost only $49 and if the [address] [place] [address] 24/7 provider determines a prescription is needed to treat your condition, one can be electronically transmitted to a nearby pharmacy*. Please take a moment to enroll today if you have not already done so. The enrollment process is free and takes just a few minutes. To enroll, please download the [address] [place] [address] 24/7 giuliano to your tablet or phone, or visit [email or web address] to enroll on your computer. And, as an [address] [place] [address] patient with a [employer] account, the results of your visits will be scanned into your electronic medical record and your primary care provider will be able to view the scanned results. We urge you to continue to see your regular [address] [place] [address] provider for your ongoing medical care. And while your primary care provider may not be the one available when you seek a [employer] virtual visit, the peace of mind you get from getting a real diagnosis real time can be priceless. For more information on [employer], view our Frequently Asked Questions (FAQs) at [email or web address]. Sincerely, 
 
Dianah Sicard, MD 
Chief Medical Officer [address] *:  certain medications cannot be prescribed via [employer] Unresulted tests-please follow up with your PCP on these results Procedure/Test Authorizing Provider CBC WITH AUTOMATED DIFF Kenya Martin MD  
 LD Kenya Martin MD  
 METABOLIC PANEL, COMPREHENSIVE Kenya Martin MD  
 PROTEIN, URINE, 24 HR Kenya Martin MD  
 PROTEIN/CREATININE RATIO, URINE Kenya Martin MD  
 URIC ACID Kenya Martin MD  
  
Providers Seen During Your Hospitalization Provider Specialty Primary office phone Mahnaz Benedict MD Obstetrics & Gynecology [phone] Your Primary Care Physician (PCP) Primary Care Physician Office Phone Office Fax Vernard Essex [phone] You are allergic to the following No active allergies Recent Documentation Height Weight Breastfeeding? BMI OB Status Smoking Status 1.676 m 91.6 kg No 32.6 kg/m2 Pregnant Former Smoker Emergency Contacts Name Discharge Info Relation Home Work Mobile Jus Chen [[id number]] [phone] Patient Belongings The following personal items are in your possession at time of discharge: 
  Dental Appliances: None  Visual Aid: None      Home Medications: None   Jewelry: None  Clothing: At bedside    Other Valuables: Cell Phone, Stephani Please provide this summary of care documentation to your next provider. Signatures-by signing, you are acknowledging that this After Visit Summary has been reviewed with you and you have received a copy. Patient Signature:  ____________________________________________________________ Date:  ____________________________________________________________  
  
Lakisha Martinez Provider Signature:  ____________________________________________________________ Date:  ____________________________________________________________

## 2018-06-23 NOTE — PROGRESS NOTES
Pt's BP has improved to 140s/80s. All preeclampsia labs are normal. FHR tracing is reactive. Pt management d/w Dr. Muna Marie. Will admit pt to observation and administer steroids, obtain 24 urine, and treat HA pain.

## 2018-06-23 NOTE — H&P
Chief Complaint: HA and elevated BPs.      33 y.o. female at 35w6d  weeks gestation who is seen for HAs and elevated BPs since this morning. Pt was experiencing scotomata and some epigastric discomfort earlier in the week that has now resolved. Pt notes good FM. She denies upper abdominal pain, uterine ctx, VB, LOF, CP, or SOB. Pt notes that she has developped elevated BPs over the last 2-3 weeks and her PObP has been following her closely. HISTORY:    History   Sexual Activity    Sexual activity: Not on file     Comment: Nexplanon     No LMP recorded. Patient is pregnant. Social History     Social History    Marital status: SINGLE     Spouse name: N/A    Number of children: N/A    Years of education: N/A     Occupational History    Not on file. Social History Main Topics    Smoking status: Former Smoker    Smokeless tobacco: Never Used    Alcohol use No      Comment: socially    Drug use: No    Sexual activity: Not on file      Comment: Nexplanon     Other Topics Concern    Seat Belt Yes     Social History Narrative    Denies physical or sexual abuse       Past Surgical History:   Procedure Laterality Date    BREAST SURGERY PROCEDURE UNLISTED      Breast lift    GASTRIC BYPASS,OBESE<150CM SHAYNA-EN-Y      HX GASTRIC BYPASS  2013    HX MASTOPEXY (BREAST LIFT)      HX OTHER SURGICAL      Tummy Tuck    HX OTHER SURGICAL      Facial surgery x 2 after dog bite       Past Medical History:   Diagnosis Date    Abnormal Papanicolaou smear of cervix     LEEP 2003    Anemia     Asthma     childhood, does not use inhaler    Depression     Zoloft    Essential hypertension     Gestational hypertension          ROS:  An 8 point review of symptoms negative except for chief complaint as described above. PHYSICAL EXAM:  Blood pressure (!) 146/101, pulse 94, temperature 98.4 °F (36.9 °C), resp. rate 18, height 5' 6\" (1.676 m), weight 91.6 kg (202 lb), SpO2 97 %.     Constitutional: The patient appears well, alert, oriented x 3. Cardiovascular: Heart RRR, no murmurs. Respiratory: Lungs clear, no respiratory distress  GI: Abdomen soft, nontender, no guarding  No fundal tenderness  Musculoskeletal: no cva tenderness  Upper ext: no edema, reflexes +2  Lower ext: no edema, neg lindsay's, reflexes +2  Skin: no rashes or lesions  Psychiatric:Mood/ Affect: appropriate  Genitourinary: SVE: closed/70%/vtx  FHT: Category 1 with mod variability  TOCO: no ctx    I personally reviewed pt's medical record including relevant labs and ultrasounds    Assessment/Plan:  Obtain preeclampsia labs and monitor pt's BPs. Will consult pt's PObP (Dr. Giovanni Casey) when her lab results return.

## 2018-06-23 NOTE — H&P
History & Physical    Name: Daniel Hammonds MRN: [id number]  SSN: [id number]    YOB: 1985  Age: 33 y.o. Sex: female      Subjective:     Reason for Admission:  Pregnancy and Chronic Hypertension    History of Present Illness: Ms. Bill Noble is a 33 y.o.  female with an estimated gestational age of 26w5d with Estimated Date of Delivery: 18. Patient complains of moderate headache  for 5 days. Pregnancy has been complicated by chronic hypertension. Patient denies right upper quadrant pain  . OB History    Para Term  AB Living   3 0   1    SAB TAB Ectopic Molar Multiple Live Births   1           # Outcome Date GA Lbr Jesse/2nd Weight Sex Delivery Anes PTL Lv   3 Current            2             1 SAB                 Past Medical History:   Diagnosis Date    Abnormal Papanicolaou smear of cervix     LEEP     Anemia     Asthma     childhood, does not use inhaler    Depression     Zoloft    Essential hypertension     Gestational hypertension      Past Surgical History:   Procedure Laterality Date    BREAST SURGERY PROCEDURE UNLISTED      Breast lift    GASTRIC BYPASS,OBESE<150CM SHAYNA-EN-Y      HX GASTRIC BYPASS  2013    HX MASTOPEXY (BREAST LIFT)      HX OTHER SURGICAL      Tummy Tuck    HX OTHER SURGICAL      Facial surgery x 2 after dog bite     Social History     Occupational History    Not on file. Social History Main Topics    Smoking status: Former Smoker    Smokeless tobacco: Never Used    Alcohol use No      Comment: socially    Drug use: No    Sexual activity: Not on file      Comment: Nexplanon      Family History   Problem Relation Age of Onset    Anxiety Mother     Anxiety Father     Hypertension Father        No Known Allergies  Prior to Admission medications    Medication Sig Start Date End Date Taking? Authorizing Provider   promethazine HCl (PHENERGAN PO) Take 25 mg by mouth.    Yes Historical Provider   sertraline (ZOLOFT) 50 mg tablet Take  by mouth daily. Yes Historical Provider   SUMAtriptan (IMITREX) 25 mg tablet Take 25 mg by mouth once as needed for Migraine. Historical Provider   ferrous sulfate (IRON) 325 mg (65 mg iron) tablet Take  by mouth Daily (before breakfast). Historical Provider   cyanocobalamin (VITAMIN B-12) 1,000 mcg tablet Take 1,000 mcg by mouth daily. Historical Provider   GLUCOSAMINE/CHONDR RUIZ A SOD (GLUCOSAMINE-CHONDROITIN) 750-600 mg tab Take  by mouth. Historical Provider        Review of Systems:  A comprehensive review of systems was negative except for that written in the History of Present Illness. Objective:     Vitals:    Vitals:    18 1530 18 1556 18 1623 18 1638   BP: (!) 146/101 (!) 154/102 143/84 141/81   Pulse: 94 82 74 75   Resp: 18      Temp: 98.4 °F (36.9 °C)      SpO2:       Weight:       Height:          Temp (24hrs), Av.1 °F (36.7 °C), Min:97.8 °F (36.6 °C), Max:98.4 °F (36.9 °C)    BP  Min: 141/81  Max: 154/102     Physical Exam:  Patient without distress.   Heart: Regular rate and rhythm  Lung: clear to auscultation throughout lung fields, no wheezes, no rales, no rhonchi and normal respiratory effort  Abdomen: soft, nontender     Membranes:  Intact  Uterine Activity:  None  Fetal Heart Rate:  Reactive       Lab/Data Review:  Recent Results (from the past 24 hour(s))   CBC WITH AUTOMATED DIFF    Collection Time: 18  4:00 PM   Result Value Ref Range    WBC 11.3 (H) 4.3 - 11.1 K/uL    RBC 4.29 4.05 - 5.25 M/uL    HGB 10.4 (L) 11.7 - 15.4 g/dL    HCT 33.3 (L) 35.8 - 46.3 %    MCV 77.6 (L) 79.6 - 97.8 FL    MCH 24.2 (L) 26.1 - 32.9 PG    MCHC 31.2 (L) 31.4 - 35.0 g/dL    RDW 27.1 (H) 11.9 - 14.6 %    PLATELET 998 115 - 267 K/uL    MPV 10.7 (L) 10.8 - 14.1 FL    DF AUTOMATED      NEUTROPHILS 80 (H) 43 - 78 %    LYMPHOCYTES 15 13 - 44 %    MONOCYTES 5 4.0 - 12.0 %    EOSINOPHILS 0 (L) 0.5 - 7.8 %    BASOPHILS 0 0.0 - 2.0 %    IMMATURE GRANULOCYTES 0 0.0 - 5.0 %    ABS. NEUTROPHILS 8.9 (H) 1.7 - 8.2 K/UL    ABS. LYMPHOCYTES 1.7 0.5 - 4.6 K/UL    ABS. MONOCYTES 0.6 0.1 - 1.3 K/UL    ABS. EOSINOPHILS 0.0 0.0 - 0.8 K/UL    ABS. BASOPHILS 0.0 0.0 - 0.2 K/UL    ABS. IMM. GRANS. 0.0 0.0 - 0.5 K/UL   METABOLIC PANEL, COMPREHENSIVE    Collection Time: 06/23/18  4:00 PM   Result Value Ref Range    Sodium 136 136 - 145 mmol/L    Potassium 3.9 3.5 - 5.1 mmol/L    Chloride 104 98 - 107 mmol/L    CO2 20 (L) 21 - 32 mmol/L    Anion gap 12 7 - 16 mmol/L    Glucose 80 65 - 100 mg/dL    BUN 8 6 - 23 MG/DL    Creatinine 0.66 0.6 - 1.0 MG/DL    GFR est AA >60 >60 ml/min/1.73m2    GFR est non-AA >60 >60 ml/min/1.73m2    Calcium 8.0 (L) 8.3 - 10.4 MG/DL    Bilirubin, total 0.2 0.2 - 1.1 MG/DL    ALT (SGPT) 30 12 - 65 U/L    AST (SGOT) 26 15 - 37 U/L    Alk. phosphatase 138 (H) 50 - 136 U/L    Protein, total 6.2 (L) 6.3 - 8.2 g/dL    Albumin 2.6 (L) 3.5 - 5.0 g/dL    Globulin 3.6 (H) 2.3 - 3.5 g/dL    A-G Ratio 0.7 (L) 1.2 - 3.5     URIC ACID    Collection Time: 06/23/18  4:00 PM   Result Value Ref Range    Uric acid 5.5 2.6 - 6.0 MG/DL   LD    Collection Time: 06/23/18  4:00 PM   Result Value Ref Range     100 - 190 U/L   PROTEIN/CREATININE RATIO, URINE    Collection Time: 06/23/18  4:00 PM   Result Value Ref Range    Protein, urine random 11 <11.9 mg/dL    Creatinine, urine 63.63 mg/dL    Protein/Creat. urine Ratio 0.2         Assessment and Plan:      Active Problems:    Hypertension affecting pregnancy in third trimester (6/23/2018)       - Headache:  Treat with Tylenol and then oral narcotic if necessary.  - Pregnancy-Induced Hypertension:  PIH precautions  Antepartum testing  Bed rest  Fetal movement instructions    Signed By:  Adam Baugh MD     June 23, 2018

## 2018-06-23 NOTE — PROGRESS NOTES
Dr Izabella Pedro at bedside. Strip reviewed. SVE closed/70. Orders received. Labs drawn and sent. Urine collected and sent.

## 2018-06-24 VITALS
WEIGHT: 202 LBS | OXYGEN SATURATION: 97 % | TEMPERATURE: 97.7 F | BODY MASS INDEX: 32.47 KG/M2 | RESPIRATION RATE: 18 BRPM | HEIGHT: 66 IN | HEART RATE: 86 BPM | DIASTOLIC BLOOD PRESSURE: 88 MMHG | SYSTOLIC BLOOD PRESSURE: 139 MMHG

## 2018-06-24 LAB
COLLECT DURATION TIME UR: 24 HR
PROT 24H UR-MRATE: 280 MG/24HR (ref 40–150)
PROT UR-MCNC: 14 MG/DL
SPECIMEN VOL ?TM UR: 2000 ML

## 2018-06-24 PROCEDURE — 74011250637 HC RX REV CODE- 250/637: Performed by: OBSTETRICS & GYNECOLOGY

## 2018-06-24 PROCEDURE — 74011250636 HC RX REV CODE- 250/636: Performed by: OBSTETRICS & GYNECOLOGY

## 2018-06-24 PROCEDURE — 59025 FETAL NON-STRESS TEST: CPT

## 2018-06-24 RX ORDER — LORAZEPAM 1 MG/1
1 TABLET ORAL EVERY 12 HOURS
Status: DISCONTINUED | OUTPATIENT
Start: 2018-06-24 | End: 2018-06-24 | Stop reason: HOSPADM

## 2018-06-24 RX ORDER — OXYCODONE AND ACETAMINOPHEN 7.5; 325 MG/1; MG/1
1-2 TABLET ORAL
Qty: 20 TAB | Refills: 0 | Status: SHIPPED | OUTPATIENT
Start: 2018-06-24 | End: 2018-07-08

## 2018-06-24 RX ORDER — LORAZEPAM 1 MG/1
.5-1 TABLET ORAL
Qty: 10 TAB | Refills: 0 | Status: SHIPPED | OUTPATIENT
Start: 2018-06-24 | End: 2018-12-17

## 2018-06-24 RX ADMIN — OXYCODONE HYDROCHLORIDE AND ACETAMINOPHEN 1 TABLET: 7.5; 325 TABLET ORAL at 09:39

## 2018-06-24 RX ADMIN — LORAZEPAM 1 MG: 1 TABLET ORAL at 15:29

## 2018-06-24 RX ADMIN — BETAMETHASONE SODIUM PHOSPHATE AND BETAMETHASONE ACETATE 12 MG: 3; 3 INJECTION, SUSPENSION INTRA-ARTICULAR; INTRALESIONAL; INTRAMUSCULAR at 17:15

## 2018-06-24 RX ADMIN — LORAZEPAM 1 MG: 1 TABLET ORAL at 15:23

## 2018-06-24 NOTE — DISCHARGE INSTRUCTIONS
Follow up with appt  with Dr Checo Doe. Rx for ativan and percocet given. Take as instructed. Do not drive while taking either of these medications. Pregnancy Precautions: Care Instructions  Your Care Instructions    There is no sure way to prevent labor before your due date ( labor) or to prevent most other pregnancy problems. But there are things you can do to increase your chances of a healthy pregnancy. Go to your appointments, follow your doctor's advice, and take good care of yourself. Eat well, and exercise (if your doctor agrees). And make sure to drink plenty of water. Follow-up care is a key part of your treatment and safety. Be sure to make and go to all appointments, and call your doctor if you are having problems. It's also a good idea to know your test results and keep a list of the medicines you take. How can you care for yourself at home? · Make sure you go to your prenatal appointments. At each visit, your doctor will check your blood pressure. Your doctor will also check to see if you have protein in your urine. High blood pressure and protein in urine are signs of preeclampsia. This condition can be dangerous for you and your baby. · Drink plenty of fluids, enough so that your urine is light yellow or clear like water. Dehydration can cause contractions. If you have kidney, heart, or liver disease and have to limit fluids, talk with your doctor before you increase the amount of fluids you drink. · Tell your doctor right away if you notice any symptoms of an infection, such as:  ¨ Burning when you urinate. ¨ A foul-smelling discharge from your vagina. ¨ Vaginal itching. ¨ Unexplained fever. ¨ Unusual pain or soreness in your uterus or lower belly. · Eat a balanced diet. Include plenty of foods that are high in calcium and iron. ¨ Foods high in calcium include milk, cheese, yogurt, almonds, and broccoli.   ¨ Foods high in iron include red meat, shellfish, poultry, eggs, beans, raisins, whole-grain bread, and leafy green vegetables. · Do not smoke. If you need help quitting, talk to your doctor about stop-smoking programs and medicines. These can increase your chances of quitting for good. · Do not drink alcohol or use illegal drugs. · Follow your doctor's directions about activity. Your doctor will let you know how much, if any, exercise you can do. · Ask your doctor if you can have sex. If you are at risk for early labor, your doctor may ask you to not have sex. · Take care to prevent falls. During pregnancy, your joints are loose, and your balance is off. Sports such as bicycling, skiing, or in-line skating can increase your risk of falling. And don't ride horses or motorcycles, dive, water ski, scuba dive, or parachute jump while you are pregnant. · Avoid getting very hot. Do not use saunas or hot tubs. Avoid staying out in the sun in hot weather for long periods. Take acetaminophen (Tylenol) to lower a high fever. · Do not take any over-the-counter or herbal medicines or supplements without talking to your doctor or pharmacist first.  When should you call for help? Call 911 anytime you think you may need emergency care. For example, call if:  ? · You passed out (lost consciousness). ? · You have severe vaginal bleeding. ? · You have severe pain in your belly or pelvis. ? · You have had fluid gushing or leaking from your vagina and you know or think the umbilical cord is bulging into your vagina. If this happens, immediately get down on your knees so your rear end (buttocks) is higher than your head. This will decrease the pressure on the cord until help arrives. ?Call your doctor now or seek immediate medical care if:  ? · You have signs of preeclampsia, such as:  ¨ Sudden swelling of your face, hands, or feet. ¨ New vision problems (such as dimness or blurring). ¨ A severe headache. ? · You have any vaginal bleeding.    ? · You have belly pain or cramping. ? · You have a fever. ? · You have had regular contractions (with or without pain) for an hour. This means that you have 8 or more within 1 hour or 4 or more in 20 minutes after you change your position and drink fluids. ? · You have a sudden release of fluid from your vagina. ? · You have low back pain or pelvic pressure that does not go away. ? · You notice that your baby has stopped moving or is moving much less than normal.   ? Watch closely for changes in your health, and be sure to contact your doctor if you have any problems. Where can you learn more? Go to http://kyle-gabrielle.info/. Enter [id number] in the search box to learn more about \"Pregnancy Precautions: Care Instructions. \"  Current as of: March 16, 2017  Content Version: 11.4  © 5318-2045 [employer]. Care instructions adapted under license by [employer] (which disclaims liability or warranty for this information). If you have questions about a medical condition or this instruction, always ask your healthcare professional. [address] any warranty or liability for your use of this information. Counting Your Baby's Kicks: Care Instructions  Your Care Instructions    Counting your baby's kicks is one way your doctor can tell that your baby is healthy. Most women-especially in a first pregnancy-feel their baby move for the first time between 16 and 22 weeks. The movement may feel like flutters rather than kicks. Your baby may move more at certain times of the day. When you are active, you may notice less kicking than when you are resting. At your prenatal visits, your doctor will ask whether the baby is active. In your last trimester, your doctor may ask you to count the number of times you feel your baby move. Follow-up care is a key part of your treatment and safety. Be sure to make and go to all appointments, and call your doctor if you are having problems.  It's also a good idea to know your test results and keep a list of the medicines you take. How do you count fetal kicks? · A common method of checking your baby's movement is to count the number of kicks or moves you feel in 1 hour. Ten movements (such as kicks, flutters, or rolls) in 1 hour are normal. Some doctors suggest that you count in the morning until you get to 10 movements. Then you can quit for that day and start again the next day. · Pick your baby's most active time of day to count. This may be any time from morning to evening. · If you do not feel 10 movements in an hour, your baby may be sleeping. Wait for the next hour and count again. When should you call for help? Call your doctor now or seek immediate medical care if:  ? · You noticed that your baby has stopped moving or is moving much less than normal.   ? Watch closely for changes in your health, and be sure to contact your doctor if you have any problems. Where can you learn more? Go to http://kyle-gabrielle.info/. Enter B202 in the search box to learn more about \"Counting Your Baby's Kicks: Care Instructions. \"  Current as of: March 16, 2017  Content Version: 11.4  © 8062-4673 [employer]. Care instructions adapted under license by [employer] (which disclaims liability or warranty for this information). If you have questions about a medical condition or this instruction, always ask your healthcare professional. [place] [address] any warranty or liability for your use of this information. High Blood Pressure in Pregnancy: Care Instructions  Your Care Instructions    High blood pressure (hypertension) means that the force of blood against your artery walls is too strong. Mild high blood pressure during pregnancy is not usually dangerous. Your doctor will probably just want to watch you closely. But when blood pressure is very high, it can reduce oxygen to your baby.  This can affect how well your baby grows. High blood pressure also means that you are at higher risk for:  · Preeclampsia. This is a problem that includes high blood pressure and damage to your liver or kidneys. It can also reduce how much oxygen your baby gets. In some cases, it leads to eclampsia. Eclampsia causes seizures. · Placental abruption. This is a problem when the placenta separates from the uterus before birth. It prevents the baby from getting enough oxygen and nutrients. Sometimes it can cause death for the baby and the mother. To prevent problems for you or your baby, you will have to check your blood pressure often. You will do this until after your baby is born. If your blood pressure rises suddenly or is very high during your pregnancy, your doctor may prescribe medicines. They can usually control blood pressure. If your blood pressure affects your or your baby's health, your doctor may need to deliver your baby early. After your baby is born, your blood pressure will probably improve. But sometimes blood pressure problems continue after birth. Follow-up care is a key part of your treatment and safety. Be sure to make and go to all appointments, and call your doctor if you are having problems. It's also a good idea to know your test results and keep a list of the medicines you take. How can you care for yourself at home? · Take and write down your blood pressure at home if your doctor tells you to. · Take your medicines exactly as prescribed. Call your doctor if you think you are having a problem with your medicine. · Do not smoke. If you need help quitting, talk to your doctor about stop-smoking programs and medicines. These can increase your chances of quitting for good. · Do not gain too much weight during your pregnancy. Talk to your doctor about how much weight gain is healthy. · Eat a healthy diet. · If your doctor says it's okay, get regular exercise.  Walking or swimming several times a week can be healthy for you and your baby. · Reduce stress, and find time to relax. This is very important if you continue to work or have a busy schedule. It's also important if you have small children at home. When should you call for help? Call 911 anytime you think you may need emergency care. For example, call if:  ? · You passed out (lost consciousness). ? · You have a seizure. ?Call your doctor now or seek immediate medical care if:  ? · You have symptoms of preeclampsia, such as:  ¨ Sudden swelling of your face, hands, or feet. ¨ New vision problems (such as dimness or blurring). ¨ A severe headache. ? · Your blood pressure is higher than it should be or rises suddenly. ? · You have new nausea or vomiting. ? · You think that you are in labor. ? · You have pain in your belly or pelvis. ? Watch closely for changes in your health, and be sure to contact your doctor if:  ? · You gain weight rapidly. Where can you learn more? Go to http://kyle-gabrielle.info/. Enter [phone] in the search box to learn more about \"High Blood Pressure in Pregnancy: Care Instructions. \"  Current as of: March 16, 2017  Content Version: 11.4  © 1499-0095 [employer]. Care instructions adapted under license by [employer] (which disclaims liability or warranty for this information). If you have questions about a medical condition or this instruction, always ask your healthcare professional. [place] [address] any warranty or liability for your use of this information. Week 37 of Your Pregnancy: Care Instructions  Your Care Instructions    You are near the end of your pregnancy-and you're probably pretty uncomfortable. It may be harder to walk around. Lying down probably isn't comfortable either. You may have trouble getting to sleep or staying asleep. Most women deliver their babies between 40 and 41 weeks.  This is a good time to think about packing a bag for the hospital with items you'll need. Then you'll be ready when labor starts. Follow-up care is a key part of your treatment and safety. Be sure to make and go to all appointments, and call your doctor if you are having problems. It's also a good idea to know your test results and keep a list of the medicines you take. How can you care for yourself at home? Learn about breastfeeding  · Breastfeeding is best for your baby and good for you. · Breast milk has antibodies to help your baby fight infections. · Mothers who breastfeed often lose weight faster, because making milk burns calories. · Learning the best ways to hold your baby will make breastfeeding easier. · Let your partner bathe and diaper the baby to keep your partner from feeling left out. Snuggle together when you breastfeed. · You may want to learn how to use a breast pump and store your milk. · If you choose to bottle feed, make the feeding feel like breastfeeding so you can bond with your baby. Always hold your baby and the bottle. Do not prop bottles or let your baby fall asleep with a bottle. Learn about crying  · It is common for babies to cry for 1 to 3 hours a day. Some cry more, some cry less. · Babies don't cry to make you upset or because you are a bad parent. · Crying is how your baby communicates. Your baby may be hungry; have gas; need a diaper change; or feel cold, warm, tired, lonely, or tense. Sometimes babies cry for unknown reasons. · If you respond to your baby's needs, he or she will learn to trust you. · Try to stay calm when your baby cries. Your baby may get more upset if he or she senses that you are upset. Know how to care for your   · Your baby's umbilical cord stump will drop off on its own, usually between 1 and 2 weeks. To care for your baby's umbilical cord area:  ¨ Clean the area at the bottom of the cord 2 or 3 times a day.   ¨ Pay special attention to the area where the cord attaches to the skin.  ¨ Keep the diaper folded below the cord. ¨ Use a damp washcloth or cotton ball to sponge bathe your baby until the stump has come off. · Your baby's first dark stool is called meconium. After the meconium is passed, your baby will develop his or her own bowel pattern. ¨ Some babies, especially  babies, have several bowel movements a day. Others have one or two a day, or one every 2 to 3 days. ¨  babies often have loose, yellow stools. Formula-fed babies have more formed stools. ¨ If your baby's stools look like little pellets, he or she is constipated. After 2 days of constipation, call your baby's doctor. · If your baby will be circumcised, you can care for him at home. ¨ Gently rinse his penis with warm water after every diaper change. Do not try to remove the film that forms on the penis. This film will go away on its own. Pat dry. ¨ Put petroleum ointment, such as Vaseline, on the area of the diaper that will touch your baby's penis. This will keep the diaper from sticking to your baby. ¨ Ask the doctor about giving your baby acetaminophen (Tylenol) for pain. Where can you learn more? Go to http://kyle-gabrielle.info/. Enter [phone] in the search box to learn more about \"Week 37 of Your Pregnancy: Care Instructions. \"  Current as of: March 16, 2017  Content Version: 11.4  © 7377-5922 [employer]. Care instructions adapted under license by [employer] (which disclaims liability or warranty for this information). If you have questions about a medical condition or this instruction, always ask your healthcare professional. [place] [address] any warranty or liability for your use of this information.

## 2018-06-24 NOTE — PROGRESS NOTES
Discharge instructions given: kick counts, labor precautions, increased bp precautions, follow up with dr Whitley Wilkinson, rx's - percocet and ativan. Pt and family verbalized understanding. Discussed when pt had taken her medication last ( percocet around 0930 this am/ ativan around 1530 this afternoon). Pt verbalized understanding of med adm times. Pt left unit ambulating. Will see dr Whitley Wilkinson tomorrow as scheduled.

## 2018-06-24 NOTE — PROGRESS NOTES
ATIVAN GIVEN. SEE MAR. WILL DO SOME SERIAL BP'S. PT DENIES HEADACHE OR BLURRED VISION. NO COMPLAINTS AT THIS TIME. HAS BEEN RESTING AND WATCHING TV TODAY. MOTHER AT BS.

## 2018-06-24 NOTE — PROGRESS NOTES
Patient slept through nite no complaints. Easily aroused for VS.  Significant other remains in room on couch. No needs at this time.

## 2018-06-24 NOTE — PROGRESS NOTES
Simple assessment complete. Patient placed on monitor for daily NST. Patient is requesting Ativan and Percocet states that she spoke with Dr João Castillo earlier about this, told patient that I will check and see if ordered will bring back to her.

## 2018-06-24 NOTE — PROGRESS NOTES
Celestone #2 (see mar) and 24 hour urine specimen taken to the lab after being completed at 1700 today. Serial bps taken. bp range varying. Pt states she is feeling less stressed following the ativan adm. States \" I feel like it took a while to kick in\".  Pt resting in the bed

## 2018-06-24 NOTE — PROGRESS NOTES
Into pt's room for afternoon assessment. Pt resting in the bed. States she is feeling and would like her ativan now. efm applied.

## 2018-06-24 NOTE — DISCHARGE SUMMARY
Obstetrical Discharge Summary     Name: Eric Carlson MRN: [id number]  SSN: [id number]    YOB: 1985  Age: 33 y.o. Sex: female      Allergies: Review of patient's allergies indicates no known allergies. Admit Date: 6/23/2018    Discharge Date: 6/24/2018     Admitting Physician: Bhanu Valles MD     Attending Physician:  Theo Jefferson MD     * Admission Diagnoses: triage  Hypertension affecting pregnancy in third trimester  Hypertension affecting pregnancy in third trimester  Hypertension affecting pregnancy in third trimester    * Discharge Diagnoses: This patient has no babies on file. Additional Diagnoses:   Hospital Problems as of 6/24/2018  Date Reviewed: 6/23/2018          Codes Class Noted - Resolved POA    * (Principal)Hypertension affecting pregnancy in third trimester ICD-10-CM: O16.3  ICD-9-CM: 642.93  6/23/2018 - Present Unknown           No results found for: ABORH, RUBELLAEXT, GRBSEXT, ABORHEXT, RUBELLAEXT, GRBSEXT   There is no immunization history on file for this patient. * Procedures: 24 hour urine, Celestone  * No surgery found *           * Discharge Condition: good and stable    * Hospital Course: Admitted due to Headache with being followed for Gestational Hypertension, admitted to see if P/E has developed. Also for HA control, no relief with outpatient treatment. With her UA starting to elevate in office and here, have given Celestone for helping mature the lungs in early delivery needed. With Percocet and some Ativan for her Anxiety, she sleep well and BP dropped,  Will send home after 24 hour urine complete, has f/u Wed., give some Percocet to use sparingly, as witht he Ativan for her anxiety, continue her Zoloft. Bedrest.  Plus get her second dose of Celestone.    * Disposition: Home    Discharge Medications:   Current Discharge Medication List      START taking these medications    Details   oxyCODONE-acetaminophen (PERCOCET 7.5) 7.5-325 mg per tablet Take 1-2 Tabs by mouth every six (6) hours as needed. Max Daily Amount: 8 Tabs. Qty: 20 Tab, Refills: 0    Associated Diagnoses: Intractable tension-type headache, unspecified chronicity pattern      LORazepam (ATIVAN) 1 mg tablet Take 0.5-1 Tabs by mouth every twelve (12) hours as needed for Anxiety. Max Daily Amount: 2 mg. Qty: 10 Tab, Refills: 0    Associated Diagnoses: Anxiety         CONTINUE these medications which have NOT CHANGED    Details   promethazine HCl (PHENERGAN PO) Take 25 mg by mouth. butalbital-aspirin-caffeine (FIORINAL) capsule Take 1 Cap by mouth every four (4) hours as needed for Pain. Indications: TENSION-TYPE HEADACHE      sertraline (ZOLOFT) 50 mg tablet Take  by mouth daily. Associated Diagnoses: Iron deficiency anemia, unspecified iron deficiency anemia type      ferrous sulfate (IRON) 325 mg (65 mg iron) tablet Take  by mouth Daily (before breakfast). cyanocobalamin (VITAMIN B-12) 1,000 mcg tablet Take 1,000 mcg by mouth daily. GLUCOSAMINE/CHONDR RUIZ A SOD (GLUCOSAMINE-CHONDROITIN) 750-600 mg tab Take  by mouth. * Follow-up Care/Patient Instructions: Activity: Activity as tolerated  Diet: Regular Diet  Wound Care: As directed    Follow-up Information     Follow up With Details Comments [place] [address]  [phone]             Signed By:  Mahnaz Benedict MD     2018                    Ante Partum High Risk Pregnancy Note    Patient admitted for pregnancy induced hypertension states she does not  have  headache , abdominal pain  , contractions, right upper quadrant pain  , vaginal bleeding  and vaginal leaking of fluid .     LOS:  1  Vitals: Temp (24hrs), Av.1 °F (36.7 °C), Min:97.8 °F (36.6 °C), Max:98.4 °F (36.9 °C)   Patient Vitals for the past 24 hrs:   BP   18 0609 131/77   18 2302 137/89   18 137/78   18 1950 135/76   18 1934 134/90 06/23/18 1638 141/81   06/23/18 1623 143/84   06/23/18 1556 (!) 154/102   06/23/18 1530 (!) 146/101   06/23/18 1457 (!) 147/98       I&O:                      Exam:  Patient without distress.                Abdomen: soft, non-tender               Fundus: soft and non tender               Fundal Height: 35 cm               Right Upper Quadrant: non-tender               Perineum: No sign of blood or amniotic fluid               Lower Extremities: 1+               Patellar Reflexes: 1+ bilaterally               Clonus: absent               Fetal Monitoring:  Accelerations: Reactive   Uterine Activity: None                 NST:  reactive           Lab/Data Review:  BMP:   Lab Results   Component Value Date/Time     06/23/2018 04:00 PM    K 3.9 06/23/2018 04:00 PM     06/23/2018 04:00 PM    CO2 20 (L) 06/23/2018 04:00 PM    AGAP 12 06/23/2018 04:00 PM    GLU 80 06/23/2018 04:00 PM    BUN 8 06/23/2018 04:00 PM    CREA 0.66 06/23/2018 04:00 PM    GFRAA >60 06/23/2018 04:00 PM    GFRNA >60 06/23/2018 04:00 PM     CMP:   Lab Results   Component Value Date/Time     06/23/2018 04:00 PM    K 3.9 06/23/2018 04:00 PM     06/23/2018 04:00 PM    CO2 20 (L) 06/23/2018 04:00 PM    AGAP 12 06/23/2018 04:00 PM    GLU 80 06/23/2018 04:00 PM    BUN 8 06/23/2018 04:00 PM    CREA 0.66 06/23/2018 04:00 PM    GFRAA >60 06/23/2018 04:00 PM    GFRNA >60 06/23/2018 04:00 PM    CA 8.0 (L) 06/23/2018 04:00 PM    ALB 2.6 (L) 06/23/2018 04:00 PM    TP 6.2 (L) 06/23/2018 04:00 PM    GLOB 3.6 (H) 06/23/2018 04:00 PM    AGRAT 0.7 (L) 06/23/2018 04:00 PM    SGOT 26 06/23/2018 04:00 PM    ALT 30 06/23/2018 04:00 PM     CBC:   Lab Results   Component Value Date/Time    WBC 11.3 (H) 06/23/2018 04:00 PM    HGB 10.4 (L) 06/23/2018 04:00 PM    HCT 33.3 (L) 06/23/2018 04:00 PM     06/23/2018 04:00 PM       Assessment and Plan:      Principal Problem:    Hypertension affecting pregnancy in third trimester (6/23/2018)          Pregnancy-Induced Hypertension:  Continue present management  PIH precautions  Bed rest  Home blood pressure monitoring

## 2018-06-24 NOTE — PROGRESS NOTES
Into room for am assessment. Pt awake and watching tv. Dr Christina Benavides at bs to see pt and discuss plan of care. Will give ativan. Will  D/c home after #2 steroids and 24 hr urine results this afternoon.

## 2018-06-24 NOTE — PROGRESS NOTES
Dr Whitley Wilkinson updated on 24 hr urine results and afternoon bp trends. Will d/c pt home. Pt has her rx for ativan and percocet. Will keep her appt with dr Whitley Wilkinson scheduled for tomorrow.

## 2018-06-25 LAB — GRBS, EXTERNAL: POSITIVE

## 2018-06-29 ENCOUNTER — HOSPITAL ENCOUNTER (EMERGENCY)
Age: 33
Discharge: HOME | End: 2018-06-29
Attending: OBSTETRICS & GYNECOLOGY | Admitting: OBSTETRICS & GYNECOLOGY
Payer: COMMERCIAL

## 2018-06-29 VITALS
SYSTOLIC BLOOD PRESSURE: 132 MMHG | TEMPERATURE: 98.4 F | HEART RATE: 74 BPM | RESPIRATION RATE: 18 BRPM | DIASTOLIC BLOOD PRESSURE: 88 MMHG

## 2018-06-29 PROBLEM — O26.893 HEADACHE IN PREGNANCY, ANTEPARTUM, THIRD TRIMESTER: Status: ACTIVE | Noted: 2018-06-29

## 2018-06-29 PROBLEM — R51.9 HEADACHE IN PREGNANCY, ANTEPARTUM, THIRD TRIMESTER: Status: ACTIVE | Noted: 2018-06-29

## 2018-06-29 PROBLEM — O13.3 GESTATIONAL HTN W/O SIGNIFICANT PROTEINURIA, THIRD TRIMESTER: Status: ACTIVE | Noted: 2018-06-29

## 2018-06-29 LAB
ALBUMIN SERPL-MCNC: 2.7 G/DL (ref 3.5–5)
ALBUMIN/GLOB SERPL: 0.7 {RATIO} (ref 1.2–3.5)
ALP SERPL-CCNC: 144 U/L (ref 50–136)
ALT SERPL-CCNC: 33 U/L (ref 12–65)
ANION GAP SERPL CALC-SCNC: 9 MMOL/L (ref 7–16)
AST SERPL-CCNC: 25 U/L (ref 15–37)
BASOPHILS # BLD: 0 K/UL (ref 0–0.2)
BASOPHILS NFR BLD: 0 % (ref 0–2)
BILIRUB SERPL-MCNC: 0.2 MG/DL (ref 0.2–1.1)
BUN SERPL-MCNC: 8 MG/DL (ref 6–23)
CALCIUM SERPL-MCNC: 8.2 MG/DL (ref 8.3–10.4)
CHLORIDE SERPL-SCNC: 102 MMOL/L (ref 98–107)
CO2 SERPL-SCNC: 23 MMOL/L (ref 21–32)
CREAT SERPL-MCNC: 0.63 MG/DL (ref 0.6–1)
CREAT UR-MCNC: 114.45 MG/DL
DIFFERENTIAL METHOD BLD: ABNORMAL
EOSINOPHIL # BLD: 0 K/UL (ref 0–0.8)
EOSINOPHIL NFR BLD: 0 % (ref 0.5–7.8)
GLOBULIN SER CALC-MCNC: 3.7 G/DL (ref 2.3–3.5)
GLUCOSE SERPL-MCNC: 82 MG/DL (ref 65–100)
GLUCOSE, GLUUPC: NEGATIVE
HCT VFR BLD AUTO: 35.3 % (ref 35.8–46.3)
HGB BLD-MCNC: 11.4 G/DL (ref 11.7–15.4)
KETONES UR-MCNC: NEGATIVE MG/DL
LYMPHOCYTES # BLD: 1.8 K/UL (ref 0.5–4.6)
LYMPHOCYTES NFR BLD: 17 % (ref 13–44)
MCH RBC QN AUTO: 25.2 PG (ref 26.1–32.9)
MCHC RBC AUTO-ENTMCNC: 32.3 G/DL (ref 31.4–35)
MCV RBC AUTO: 77.9 FL (ref 79.6–97.8)
MONOCYTES # BLD: 0.6 K/UL (ref 0.1–1.3)
MONOCYTES NFR BLD: 6 % (ref 4–12)
NEUTS SEG # BLD: 7.9 K/UL (ref 1.7–8.2)
NEUTS SEG NFR BLD: 77 % (ref 43–78)
PLATELET # BLD AUTO: 319 K/UL (ref 150–450)
PLATELET COMMENTS,PCOM: ADEQUATE
PMV BLD AUTO: 10.8 FL (ref 10.8–14.1)
POTASSIUM SERPL-SCNC: 3.8 MMOL/L (ref 3.5–5.1)
PROT SERPL-MCNC: 6.4 G/DL (ref 6.3–8.2)
PROT UR QL: NEGATIVE
PROT UR-MCNC: 22 MG/DL
PROT/CREAT UR-RTO: 0.2
RBC # BLD AUTO: 4.53 M/UL (ref 4.05–5.25)
RBC MORPH BLD: ABNORMAL
SODIUM SERPL-SCNC: 134 MMOL/L (ref 136–145)
URATE SERPL-MCNC: 5 MG/DL (ref 2.6–6)
WBC # BLD AUTO: 10.3 K/UL (ref 4.3–11.1)

## 2018-06-29 PROCEDURE — 74011250637 HC RX REV CODE- 250/637: Performed by: OBSTETRICS & GYNECOLOGY

## 2018-06-29 PROCEDURE — 99285 EMERGENCY DEPT VISIT HI MDM: CPT

## 2018-06-29 PROCEDURE — 81002 URINALYSIS NONAUTO W/O SCOPE: CPT | Performed by: OBSTETRICS & GYNECOLOGY

## 2018-06-29 PROCEDURE — 84156 ASSAY OF PROTEIN URINE: CPT | Performed by: OBSTETRICS & GYNECOLOGY

## 2018-06-29 PROCEDURE — 80053 COMPREHEN METABOLIC PANEL: CPT | Performed by: OBSTETRICS & GYNECOLOGY

## 2018-06-29 PROCEDURE — 84550 ASSAY OF BLOOD/URIC ACID: CPT | Performed by: OBSTETRICS & GYNECOLOGY

## 2018-06-29 PROCEDURE — 85025 COMPLETE CBC W/AUTO DIFF WBC: CPT | Performed by: OBSTETRICS & GYNECOLOGY

## 2018-06-29 PROCEDURE — 59025 FETAL NON-STRESS TEST: CPT

## 2018-06-29 RX ORDER — OXYCODONE AND ACETAMINOPHEN 5; 325 MG/1; MG/1
1 TABLET ORAL ONCE
Status: COMPLETED | OUTPATIENT
Start: 2018-06-29 | End: 2018-06-29

## 2018-06-29 RX ADMIN — OXYCODONE HYDROCHLORIDE AND ACETAMINOPHEN 1 TABLET: 5; 325 TABLET ORAL at 12:03

## 2018-06-29 NOTE — DISCHARGE INSTRUCTIONS
High Blood Pressure in Pregnancy: Care Instructions  Your Care Instructions    High blood pressure (hypertension) means that the force of blood against your artery walls is too strong. Mild high blood pressure during pregnancy is not usually dangerous. Your doctor will probably just want to watch you closely. But when blood pressure is very high, it can reduce oxygen to your baby. This can affect how well your baby grows. High blood pressure also means that you are at higher risk for:  · Preeclampsia. This is a problem that includes high blood pressure and damage to your liver or kidneys. It can also reduce how much oxygen your baby gets. In some cases, it leads to eclampsia. Eclampsia causes seizures. · Placental abruption. This is a problem when the placenta separates from the uterus before birth. It prevents the baby from getting enough oxygen and nutrients. Sometimes it can cause death for the baby and the mother. To prevent problems for you or your baby, you will have to check your blood pressure often. You will do this until after your baby is born. If your blood pressure rises suddenly or is very high during your pregnancy, your doctor may prescribe medicines. They can usually control blood pressure. If your blood pressure affects your or your baby's health, your doctor may need to deliver your baby early. After your baby is born, your blood pressure will probably improve. But sometimes blood pressure problems continue after birth. Follow-up care is a key part of your treatment and safety. Be sure to make and go to all appointments, and call your doctor if you are having problems. It's also a good idea to know your test results and keep a list of the medicines you take. How can you care for yourself at home? · Take and write down your blood pressure at home if your doctor tells you to. · Take your medicines exactly as prescribed.  Call your doctor if you think you are having a problem with your medicine. · Do not smoke. If you need help quitting, talk to your doctor about stop-smoking programs and medicines. These can increase your chances of quitting for good. · Do not gain too much weight during your pregnancy. Talk to your doctor about how much weight gain is healthy. · Eat a healthy diet. · If your doctor says it's okay, get regular exercise. Walking or swimming several times a week can be healthy for you and your baby. · Reduce stress, and find time to relax. This is very important if you continue to work or have a busy schedule. It's also important if you have small children at home. When should you call for help? Call 911 anytime you think you may need emergency care. For example, call if:  ? · You passed out (lost consciousness). ? · You have a seizure. ?Call your doctor now or seek immediate medical care if:  ? · You have symptoms of preeclampsia, such as:  ¨ Sudden swelling of your face, hands, or feet. ¨ New vision problems (such as dimness or blurring). ¨ A severe headache. ? · Your blood pressure is higher than it should be or rises suddenly. ? · You have new nausea or vomiting. ? · You think that you are in labor. ? · You have pain in your belly or pelvis. ? Watch closely for changes in your health, and be sure to contact your doctor if:  ? · You gain weight rapidly. Where can you learn more? Go to http://kyle-gabrielle.info/. Enter [phone] in the search box to learn more about \"High Blood Pressure in Pregnancy: Care Instructions. \"  Current as of: March 16, 2017  Content Version: 11.4  © 1328-3192 Healthwise, Incorporated. Care instructions adapted under license by [employer] (which disclaims liability or warranty for this information). If you have questions about a medical condition or this instruction, always ask your healthcare professional. [address] any warranty or liability for your use of this information.

## 2018-06-29 NOTE — ED PROVIDER NOTES
Chief Complaint:headache and elevated bp      33 y.o. female  at 36w5d  weeks gestation who is seen for  headache. Pt has been having elevated bp's. Yesterday afternoon pt reports that she had a headache with vomiting. Able to keep down food this am, but headache persists. Denies swelling or abd pain. No diarrhea or fever. No other c/o. Good fetal movement. No loss of fluid or vag bleeding        HISTORY:    History   Sexual Activity    Sexual activity: Not on file     Comment: Nexplanon     No LMP recorded. Patient is pregnant. Social History     Social History    Marital status: SINGLE     Spouse name: N/A    Number of children: N/A    Years of education: N/A     Occupational History    Not on file. Social History Main Topics    Smoking status: Former Smoker    Smokeless tobacco: Never Used    Alcohol use No      Comment: socially    Drug use: No    Sexual activity: Not on file      Comment: Nexplanon     Other Topics Concern    Seat Belt Yes     Social History Narrative    Denies physical or sexual abuse       Past Surgical History:   Procedure Laterality Date    BREAST SURGERY PROCEDURE UNLISTED      Breast lift    GASTRIC BYPASS,OBESE<150CM SHAYNA-EN-Y      HX GASTRIC BYPASS  2013    HX MASTOPEXY (BREAST LIFT)      HX OTHER SURGICAL      Tummy Tuck    HX OTHER SURGICAL      Facial surgery x 2 after dog bite       Past Medical History:   Diagnosis Date    Abnormal Papanicolaou smear of cervix     LEEP 2003    Anemia     Asthma     childhood, does not use inhaler    Depression     Zoloft    Essential hypertension     Gestational hypertension          ROS:  A 12 point review of symptoms negative except for chief complaint as described above. PHYSICAL EXAM:  Blood pressure (!) 133/98, pulse 98, temperature 98.4 °F (36.9 °C), resp. rate 18. Constitutional: The patient appears well, alert, oriented x 3. Cardiovascular: Heart RRR, no murmurs.    Respiratory: Lungs clear, no respiratory distress  GI: Abdomen soft, nontender, no guarding  No fundal tenderness  Musculoskeletal: no cva tenderness  Upper ext: no edema, reflexes +2  Lower ext: no edema, neg lindsay's, reflexes +2  Skin: no rashes or lesions  Psychiatric:Mood/ Affect: appropriate  Genitourinary: SVE:cl/50/ mid position  FHT:reactive cat one  TOCO:occ ctx    Recent Results (from the past 12 hour(s))   POC URINE DIPSTICK MANUAL    Collection Time: 06/29/18 11:23 AM   Result Value Ref Range    Protein (POC) Negative Negative    Glucose, urine (POC) Negative Negative    Ketones (POC) Negative Negative   PROTEIN/CREATININE RATIO, URINE    Collection Time: 06/29/18 11:46 AM   Result Value Ref Range    Protein, urine random 22 (H) <11.9 mg/dL    Creatinine, urine 114.45 mg/dL    Protein/Creat. urine Ratio 0.2     CBC WITH AUTOMATED DIFF    Collection Time: 06/29/18 11:47 AM   Result Value Ref Range    WBC 10.3 4.3 - 11.1 K/uL    RBC 4.53 4.05 - 5.25 M/uL    HGB 11.4 (L) 11.7 - 15.4 g/dL    HCT 35.3 (L) 35.8 - 46.3 %    MCV 77.9 (L) 79.6 - 97.8 FL    MCH 25.2 (L) 26.1 - 32.9 PG    MCHC 32.3 31.4 - 35.0 g/dL    PLATELET 223 746 - 967 K/uL    MPV 10.8 10.8 - 14.1 FL    DF PENDING    METABOLIC PANEL, COMPREHENSIVE    Collection Time: 06/29/18 11:47 AM   Result Value Ref Range    Sodium 134 (L) 136 - 145 mmol/L    Potassium 3.8 3.5 - 5.1 mmol/L    Chloride 102 98 - 107 mmol/L    CO2 23 21 - 32 mmol/L    Anion gap 9 7 - 16 mmol/L    Glucose 82 65 - 100 mg/dL    BUN 8 6 - 23 MG/DL    Creatinine 0.63 0.6 - 1.0 MG/DL    GFR est AA >60 >60 ml/min/1.73m2    GFR est non-AA >60 >60 ml/min/1.73m2    Calcium 8.2 (L) 8.3 - 10.4 MG/DL    Bilirubin, total 0.2 0.2 - 1.1 MG/DL    ALT (SGPT) 33 12 - 65 U/L    AST (SGOT) 25 15 - 37 U/L    Alk.  phosphatase 144 (H) 50 - 136 U/L    Protein, total 6.4 6.3 - 8.2 g/dL    Albumin 2.7 (L) 3.5 - 5.0 g/dL    Globulin 3.7 (H) 2.3 - 3.5 g/dL    A-G Ratio 0.7 (L) 1.2 - 3.5     URIC ACID    Collection Time: 06/29/18 11:47 AM   Result Value Ref Range    Uric acid 5.0 2.6 - 6.0 MG/DL       I personally reviewed pt's medical record including relevant labs and ultrasounds    Assessment/Plan:  34 yo  at 42w10d with gest htn  Does not meet criteria for pre-eclampsia  Home with precautions  Keep f/u as scheduled Monday

## 2018-06-29 NOTE — PROGRESS NOTES
Results for Dalton Chapa (MRN [id number]) as of 6/29/2018 12:18   Ref.  Range 6/29/2018 11:23   Protein (POC) Latest Ref Range: Negative  Negative   Glucose, urine (POC) Latest Ref Range: Negative  Negative   Ketones (POC) Latest Ref Range: Negative  Negative

## 2018-06-29 NOTE — IP AVS SNAPSHOT
[address] 
[phone] Patient: Inge Ramirez MRN: [id number] DTF:[birth year] About your hospitalization You were admitted on:  N/A You [place] received [place] in [place]  [facility] You were discharged on:  June 29, 2018 Why you were hospitalized Your primary diagnosis was:  Not on File Your diagnoses also included:  Headache In Pregnancy, Antepartum, Third Trimester, Gestational Htn W/O Significant Proteinuria, Third Trimester Follow-up Information Follow up With Details Comments Contact Info Darylene Dally, FNP   [id number] [facility] [address] 
[phone] Discharge Orders None A check vivienne indicates which time of day the medication should be taken. My Medications ASK your doctor about these medications Instructions Each Dose to Equal  
 Morning Noon Evening Bedtime  
 butalbital-aspirin-caffeine capsule Commonly known as:  [place] Your last dose was: Your next dose is: Take 1 Cap by mouth every four (4) hours as needed for Pain. Indications: TENSION-TYPE HEADACHE  
 1 Cap  
    
   
   
   
  
 glucosamine-chondroitin 750-600 mg Tab Your last dose was: Your next dose is: Take  by mouth. Iron 325 mg (65 mg iron) tablet Generic drug:  ferrous sulfate Your last dose was: Your next dose is: Take  by mouth Daily (before breakfast). LORazepam 1 mg tablet Commonly known as:  ATIVAN Your last dose was: Your next dose is: Take 0.5-1 Tabs by mouth every twelve (12) hours as needed for Anxiety. Max Daily Amount: 2 mg. 0.5-1 mg  
    
   
   
   
  
 oxyCODONE-acetaminophen 7.5-325 mg per tablet Commonly known as:  PERCOCET 7.5 Your last dose was: Your next dose is: Take 1-2 Tabs by mouth every six (6) hours as needed. Max Daily Amount: 8 Tabs. 1-2 Tab PHENERGAN PO Your last dose was: Your next dose is: Take 25 mg by mouth. 25 mg  
    
   
   
   
  
 VITAMIN B-12 1,000 mcg tablet Generic drug:  cyanocobalamin Your last dose was: Your next dose is: Take 1,000 mcg by mouth daily. 1000 mcg ZOLOFT 50 mg tablet Generic drug:  sertraline Your last dose was: Your next dose is: Take  by mouth daily. Opioid Education Prescription Opioids: What You Need to Know: 
 
Prescription opioids can be used to help relieve moderate-to-severe pain and are often prescribed following a surgery or injury, or for certain health conditions. These medications can be an important part of treatment but also come with serious risks. Opioids are strong pain medicines. Examples include hydrocodone, oxycodone, fentanyl, and morphine. Heroin is an example of an illegal opioid. It is important to work with your health care provider to make sure you are getting the safest, most effective care. WHAT ARE THE RISKS AND SIDE EFFECTS OF OPIOID USE? Prescription opioids carry serious risks of addiction and overdose, especially with prolonged use. An opioid overdose, often marked by slow breathing, can cause sudden death. The use of prescription opioids can have a number of side effects as well, even when taken as directed. · Tolerance-meaning you might need to take more of a medication for the same pain relief · Physical dependence-meaning you have symptoms of withdrawal when the medication is stopped. Withdrawal symptoms can include nausea, sweating, chills, diarrhea, stomach cramps, and muscle aches. Withdrawal can last up to several weeks, depending on which drug you took and how long you took it. · Increased sensitivity to pain · Constipation · Nausea, vomiting, and dry mouth · Sleepiness and dizziness · Confusion · Depression · Low levels of testosterone that can result in lower sex drive, energy, and strength · Itching and sweating RISKS ARE GREATER WITH:      
· History of drug misuse, substance use disorder, or overdose · Mental health conditions (such as depression or anxiety) · Sleep apnea · Older age (72 years or older) · Pregnancy Avoid alcohol while taking prescription opioids. Also, unless specifically advised by your health care provider, medications to avoid include: · Benzodiazepines (such as Xanax or Valium) · Muscle relaxants (such as Soma or Flexeril) · Hypnotics (such as Ambien or Lunesta) · Other prescription opioids KNOW YOUR OPTIONS Talk to your health care provider about ways to manage your pain that don't involve prescription opioids. Some of these options may actually work better and have fewer risks and side effects. Options may include: 
· Pain relievers such as acetaminophen, ibuprofen, and naproxen · Some medications that are also used for depression or seizures · Physical therapy and exercise · Counseling to help patients learn how to cope better with triggers of pain and stress. · Application of heat or cold compress · Massage therapy · Relaxation techniques Be Informed Make sure you know the name of your medication, how much and how often to take it, and its potential risks & side effects. IF YOU ARE PRESCRIBED OPIOIDS FOR PAIN: 
· Never take opioids in greater amounts or more often than prescribed. Remember the goal is not to be pain-free but to manage your pain at a tolerable level. · Follow up with your primary care provider to: · Work together to create a plan on how to manage your pain. · Talk about ways to help manage your pain that don't involve prescription opioids. · Talk about any and all concerns and side effects. · Help prevent misuse and abuse. · Never sell or share prescription opioids · Help prevent misuse and abuse. · Store prescription opioids in a secure place and out of reach of others (this may include visitors, children, friends, and family). · Safely dispose of unused/unwanted prescription opioids: Find your community drug take-back program or your pharmacy mail-back program, or flush them down the toilet, following guidance from the Food and Drug Administration (www.fda.gov/Drugs/ResourcesForYou). · Visit www.cdc.gov/drugoverdose to learn about the risks of opioid abuse and overdose. · If you believe you may be struggling with addiction, tell your health care provider and ask for guidance or call [employer] at 9-060-672-DXRT. Discharge Instructions High Blood Pressure in Pregnancy: Care Instructions Your Care Instructions High blood pressure (hypertension) means that the force of blood against your artery walls is too strong. Mild high blood pressure during pregnancy is not usually dangerous. Your doctor will probably just want to watch you closely. But when blood pressure is very high, it can reduce oxygen to your baby. This can affect how well your baby grows. High blood pressure also means that you are at higher risk for: · Preeclampsia. This is a problem that includes high blood pressure and damage to your liver or kidneys. It can also reduce how much oxygen your baby gets. In some cases, it leads to eclampsia. Eclampsia causes seizures. · Placental abruption. This is a problem when the placenta separates from the uterus before birth. It prevents the baby from getting enough oxygen and nutrients. Sometimes it can cause death for the baby and the mother.  
To prevent problems for you or your baby, you will have to check your blood pressure often. You will do this until after your baby is born. If your blood pressure rises suddenly or is very high during your pregnancy, your doctor may prescribe medicines. They can usually control blood pressure. If your blood pressure affects your or your baby's health, your doctor may need to deliver your baby early. After your baby is born, your blood pressure will probably improve. But sometimes blood pressure problems continue after birth. Follow-up care is a key part of your treatment and safety. Be sure to make and go to all appointments, and call your doctor if you are having problems. It's also a good idea to know your test results and keep a list of the medicines you take. How can you care for yourself at home? · Take and write down your blood pressure at home if your doctor tells you to. · Take your medicines exactly as prescribed. Call your doctor if you think you are having a problem with your medicine. · Do not smoke. If you need help quitting, talk to your doctor about stop-smoking programs and medicines. These can increase your chances of quitting for good. · Do not gain too much weight during your pregnancy. Talk to your doctor about how much weight gain is healthy. · Eat a healthy diet. · If your doctor says it's okay, get regular exercise. Walking or swimming several times a week can be healthy for you and your baby. · Reduce stress, and find time to relax. This is very important if you continue to work or have a busy schedule. It's also important if you have small children at home. When should you call for help? Call 911 anytime you think you may need emergency care. For example, call if: 
? · You passed out (lost consciousness). ? · You have a seizure. ?Call your doctor now or seek immediate medical care if: 
? · You have symptoms of preeclampsia, such as: 
¨ Sudden swelling of your face, hands, or feet. ¨ New vision problems (such as dimness or blurring). ¨ A severe headache. ? · Your blood pressure is higher than it should be or rises suddenly. ? · You have new nausea or vomiting. ? · You think that you are in labor. ? · You have pain in your belly or pelvis. ? Watch closely for changes in your health, and be sure to contact your doctor if: 
? · You gain weight rapidly. Where can you learn more? Go to http://kyle-gabrielle.info/. Enter [phone] in the search box to learn more about \"High Blood Pressure in Pregnancy: Care Instructions. \" Current as of: March 16, 2017 Content Version: 11.4 © 2025-9265 [employer]. Care instructions adapted under license by [employer] (which disclaims liability or warranty for this information). If you have questions about a medical condition or this instruction, always ask your healthcare professional. [address] any warranty or liability for your use of this information. Introducing [facility]! [facility] introduces RockYou patient portal. Now you can access parts of your medical record, email your doctor's office, and request medication refills online. 1. In your internet browser, go to [email or web address] 2. Click on the First Time User? Click Here link in the Sign In box. You will see the New Member Sign Up page. 3. Enter your RockYou Access Code exactly as it appears below. You will not need to use this code after youve completed the sign-up process. If you do not sign up before the expiration date, you must request a new code. · RockYou Access Code: [email or web address] Expires: 8/28/2018 12:14 PM 
 
4. Enter the last four digits of your Social Security Number (xxxx) and Date of Birth (mm/dd/yyyy) as indicated and click Submit. You will be taken to the next sign-up page. 5. Create a RockYou ID. This will be your RockYou login ID and cannot be changed, so think of one that is secure and easy to remember. 6. Create a [employer] password. You can change your password at any time. 7. Enter your Password Reset Question and Answer. This can be used at a later time if you forget your password. 8. Enter your e-mail address. You will receive e-mail notification when new information is available in [address] 9. Click Sign Up. You can now view and download portions of your medical record. 10. Click the Download Summary menu link to download a portable copy of your medical information. If you have questions, please visit the Frequently Asked Questions section of the [employer] website. Remember, [employer] is NOT to be used for urgent needs. For medical emergencies, dial 911. Now available from your iPhone and Android! Introducing Robson Marina As a [facility] patient, I wanted to make you aware of our electronic visit tool called Robson Marina. Reyn[employer]P[facility]acleCare/Codigames allows you to connect within minutes with a medical provider 24 hours a day, seven days a week via a mobile device or tablet or logging into a secure website from your computer. You can access Robson Marina from anywhere in the [place]. A virtual visit might be right for you when you have a simple condition and feel like you just dont want to get out of bed, or cant get away from work for an appointment, when your regular [facility] provider is not available (evenings, weekends or holidays), or when youre out of town and need minor care. Electronic visits cost only $49 and if the [employer]Cod[employer] provider determines a prescription is needed to treat your condition, one can be electronically transmitted to a nearby pharmacy*. Please take a moment to enroll today if you have not already done so. The enrollment process is free and takes just a few minutes. To enroll, please download the [employer] giuliano to your tablet or phone, or visit [email or web address] to enroll on your computer. And, as an [address] [place] [address] patient with a Touch of Classic account, the results of your visits will be scanned into your electronic medical record and your primary care provider will be able to view the scanned results. We urge you to continue to see your regular [employer] provider for your ongoing medical care. And while your primary care provider may not be the one available when you seek a Robson Melgozabritneyfin virtual visit, the peace of mind you get from getting a real diagnosis real time can be priceless. For more information on Robson Marina, view our Frequently Asked Questions (FAQs) at [email or web address]. Sincerely, 
 
Radha Moon MD 
Chief Medical Officer [facility] Solange Farah *:  certain medications cannot be prescribed via Robson Melgozakath Providers Seen During Your Hospitalization Provider Specialty Primary office phone Santos Shepard MD Obstetrics & Gynecology [phone] Your Primary Care Physician (PCP) Primary Care Physician Office Phone Office Fax Nicholson Baas [phone] You are allergic to the following No active allergies Recent Documentation OB Status Smoking Status Pregnant Former Smoker Emergency Contacts Name Discharge Info Relation Home Work Mobile Jus Chen [17] [phone] Patient Belongings The following personal items are in your possession at time of discharge: 
                             
 
  
  
 Please provide this summary of care documentation to your next provider. Signatures-by signing, you are acknowledging that this After Visit Summary has been reviewed with you and you have received a copy. Patient Signature:  ____________________________________________________________ Date:  ____________________________________________________________  
  
Jacki Brooks  Provider Signature: ____________________________________________________________ Date:  ____________________________________________________________

## 2018-06-29 NOTE — IP AVS SNAPSHOT
[address] 
[phone] Patient: Moraima Walsh MRN: [id number] XLK:[id number]/80/[id number] A check vivienne indicates which time of day the medication should be taken. My Medications ASK your doctor about these medications Instructions Each Dose to Equal  
 Morning Noon Evening Bedtime  
 butalbital-aspirin-caffeine capsule Commonly known as:  Vincent Coronado Your last dose was: Your next dose is: Take 1 Cap by mouth every four (4) hours as needed for Pain. Indications: TENSION-TYPE HEADACHE  
 1 Cap  
    
   
   
   
  
 glucosamine-chondroitin 750-600 mg Tab Your last dose was: Your next dose is: Take  by mouth. Iron 325 mg (65 mg iron) tablet Generic drug:  ferrous sulfate Your last dose was: Your next dose is: Take  by mouth Daily (before breakfast). LORazepam 1 mg tablet Commonly known as:  ATIVAN Your last dose was: Your next dose is: Take 0.5-1 Tabs by mouth every twelve (12) hours as needed for Anxiety. Max Daily Amount: 2 mg. 0.5-1 mg  
    
   
   
   
  
 oxyCODONE-acetaminophen 7.5-325 mg per tablet Commonly known as:  PERCOCET 7.5 Your last dose was: Your next dose is: Take 1-2 Tabs by mouth every six (6) hours as needed. Max Daily Amount: 8 Tabs. 1-2 Tab PHENERGAN PO Your last dose was: Your next dose is: Take 25 mg by mouth. 25 mg  
    
   
   
   
  
 VITAMIN B-12 1,000 mcg tablet Generic drug:  cyanocobalamin Your last dose was: Your next dose is: Take 1,000 mcg by mouth daily. 1000 mcg ZOLOFT 50 mg tablet Generic drug:  sertraline Your last dose was: Your next dose is: Take  by mouth daily.

## 2018-06-29 NOTE — PROGRESS NOTES
12:03 Medication Given oxyCODONE-acetaminophen (PERCOCET) 5-325 mg per tablet 1 Tab -  Dose: 1 Tab ; Route: Oral ; Scheduled Time: [address]  Blake Moyer RN

## 2018-06-29 NOTE — PROGRESS NOTES
Patient discharged home per MD order. Discharge instructions completed and patient verbalized understanding. Questions encouraged. Accompanied by . DC in stable condition.

## 2018-07-06 ENCOUNTER — HOSPITAL ENCOUNTER (INPATIENT)
Age: 33
LOS: 2 days | Discharge: HOME | End: 2018-07-08
Attending: OBSTETRICS & GYNECOLOGY | Admitting: OBSTETRICS & GYNECOLOGY
Payer: COMMERCIAL

## 2018-07-06 ENCOUNTER — ANESTHESIA (OUTPATIENT)
Dept: LABOR AND DELIVERY | Age: 33
End: 2018-07-06
Payer: COMMERCIAL

## 2018-07-06 ENCOUNTER — ANESTHESIA EVENT (OUTPATIENT)
Dept: LABOR AND DELIVERY | Age: 33
End: 2018-07-06
Payer: COMMERCIAL

## 2018-07-06 DIAGNOSIS — Z37.9 NORMAL LABOR: Primary | ICD-10-CM

## 2018-07-06 PROBLEM — O14.93 PREECLAMPSIA, THIRD TRIMESTER: Status: ACTIVE | Noted: 2018-07-06

## 2018-07-06 LAB
ABO + RH BLD: NORMAL
ALBUMIN SERPL-MCNC: 2.6 G/DL (ref 3.5–5)
ALBUMIN/GLOB SERPL: 0.7 {RATIO} (ref 1.2–3.5)
ALP SERPL-CCNC: 152 U/L (ref 50–136)
ALT SERPL-CCNC: 40 U/L (ref 12–65)
ANION GAP SERPL CALC-SCNC: 13 MMOL/L (ref 7–16)
AST SERPL-CCNC: 34 U/L (ref 15–37)
BASE DEFICIT BLDCOA-SCNC: 1.2 MMOL/L (ref 0–2)
BASE DEFICIT BLDCOV-SCNC: 1.7 MMOL/L (ref 1.9–7.7)
BDY SITE: ABNORMAL
BDY SITE: ABNORMAL
BILIRUB DIRECT SERPL-MCNC: <0.1 MG/DL
BILIRUB SERPL-MCNC: 0.1 MG/DL (ref 0.2–1.1)
BLOOD GROUP ANTIBODIES SERPL: NORMAL
BUN SERPL-MCNC: 9 MG/DL (ref 6–23)
CALCIUM SERPL-MCNC: 8.5 MG/DL (ref 8.3–10.4)
CHLORIDE SERPL-SCNC: 104 MMOL/L (ref 98–107)
CO2 SERPL-SCNC: 21 MMOL/L (ref 21–32)
CREAT SERPL-MCNC: 0.52 MG/DL (ref 0.6–1)
GLOBULIN SER CALC-MCNC: 3.6 G/DL (ref 2.3–3.5)
GLUCOSE SERPL-MCNC: 78 MG/DL (ref 65–100)
HCO3 BLDCOA-SCNC: 27 MMOL/L (ref 22–26)
HCO3 BLDV-SCNC: 24 MMOL/L
HCT VFR BLD AUTO: 36.8 % (ref 35.8–46.3)
HGB BLD-MCNC: 11.8 G/DL (ref 11.7–15.4)
LDH SERPL L TO P-CCNC: 272 U/L (ref 100–190)
MCH RBC QN AUTO: 26.3 PG (ref 26.1–32.9)
MCHC RBC AUTO-ENTMCNC: 32.1 G/DL (ref 31.4–35)
MCV RBC AUTO: 82.1 FL (ref 79.6–97.8)
PCO2 BLDCOA: 59 MMHG (ref 33–49)
PCO2 BLDCOV: 42 MMHG (ref 14.1–43.3)
PH BLDCOA: 7.28 [PH] (ref 7.21–7.31)
PH BLDCOV: 7.37 [PH] (ref 7.2–7.44)
PLATELET # BLD AUTO: 211 K/UL (ref 150–450)
PMV BLD AUTO: 10.8 FL (ref 10.8–14.1)
PO2 BLDCOA: 19 MMHG (ref 9–19)
PO2 BLDV: 31 MMHG (ref 30.4–57.2)
POTASSIUM SERPL-SCNC: 4.1 MMOL/L (ref 3.5–5.1)
PROT SERPL-MCNC: 6.2 G/DL (ref 6.3–8.2)
RBC # BLD AUTO: 4.48 M/UL (ref 4.05–5.25)
SERVICE CMNT-IMP: ABNORMAL
SERVICE CMNT-IMP: ABNORMAL
SODIUM SERPL-SCNC: 138 MMOL/L (ref 136–145)
SPECIMEN EXP DATE BLD: NORMAL
URATE SERPL-MCNC: 5.1 MG/DL (ref 2.6–6)
WBC # BLD AUTO: 9.6 K/UL (ref 4.3–11.1)

## 2018-07-06 PROCEDURE — 74011250636 HC RX REV CODE- 250/636: Performed by: OBSTETRICS & GYNECOLOGY

## 2018-07-06 PROCEDURE — 82803 BLOOD GASES ANY COMBINATION: CPT

## 2018-07-06 PROCEDURE — 4A1HXCZ MONITORING OF PRODUCTS OF CONCEPTION, CARDIAC RATE, EXTERNAL APPROACH: ICD-10-PCS | Performed by: OBSTETRICS & GYNECOLOGY

## 2018-07-06 PROCEDURE — A4300 CATH IMPL VASC ACCESS PORTAL: HCPCS | Performed by: ANESTHESIOLOGY

## 2018-07-06 PROCEDURE — 74011000258 HC RX REV CODE- 258: Performed by: OBSTETRICS & GYNECOLOGY

## 2018-07-06 PROCEDURE — 86901 BLOOD TYPING SEROLOGIC RH(D): CPT | Performed by: OBSTETRICS & GYNECOLOGY

## 2018-07-06 PROCEDURE — 75410000003 HC RECOV DEL/VAG/CSECN EA 0.5 HR

## 2018-07-06 PROCEDURE — 76060000078 HC EPIDURAL ANESTHESIA

## 2018-07-06 PROCEDURE — 77030020254 HC SOL INJ D5LR LACTATED RINGER

## 2018-07-06 PROCEDURE — 65270000029 HC RM PRIVATE

## 2018-07-06 PROCEDURE — 74011258636 HC RX REV CODE- 258/636: Performed by: OBSTETRICS & GYNECOLOGY

## 2018-07-06 PROCEDURE — 83615 LACTATE (LD) (LDH) ENZYME: CPT | Performed by: OBSTETRICS & GYNECOLOGY

## 2018-07-06 PROCEDURE — 77030003028 HC SUT VCRL J&J -A

## 2018-07-06 PROCEDURE — 77030011945 HC CATH URIN INT ST MENT -A

## 2018-07-06 PROCEDURE — 80048 BASIC METABOLIC PNL TOTAL CA: CPT | Performed by: OBSTETRICS & GYNECOLOGY

## 2018-07-06 PROCEDURE — 3E033VJ INTRODUCTION OF OTHER HORMONE INTO PERIPHERAL VEIN, PERCUTANEOUS APPROACH: ICD-10-PCS | Performed by: OBSTETRICS & GYNECOLOGY

## 2018-07-06 PROCEDURE — 80076 HEPATIC FUNCTION PANEL: CPT | Performed by: OBSTETRICS & GYNECOLOGY

## 2018-07-06 PROCEDURE — 0UQMXZZ REPAIR VULVA, EXTERNAL APPROACH: ICD-10-PCS | Performed by: OBSTETRICS & GYNECOLOGY

## 2018-07-06 PROCEDURE — 10907ZC DRAINAGE OF AMNIOTIC FLUID, THERAPEUTIC FROM PRODUCTS OF CONCEPTION, VIA NATURAL OR ARTIFICIAL OPENING: ICD-10-PCS | Performed by: OBSTETRICS & GYNECOLOGY

## 2018-07-06 PROCEDURE — 77030007880 HC KT SPN EPDRL BBMI -B: Performed by: ANESTHESIOLOGY

## 2018-07-06 PROCEDURE — 74011000250 HC RX REV CODE- 250

## 2018-07-06 PROCEDURE — 85027 COMPLETE CBC AUTOMATED: CPT | Performed by: OBSTETRICS & GYNECOLOGY

## 2018-07-06 PROCEDURE — 77030014125 HC TY EPDRL BBMI -B: Performed by: ANESTHESIOLOGY

## 2018-07-06 PROCEDURE — 75410000000 HC DELIVERY VAGINAL/SINGLE: Performed by: OBSTETRICS & GYNECOLOGY

## 2018-07-06 PROCEDURE — 77030018846 HC SOL IRR STRL H20 ICUM -A

## 2018-07-06 PROCEDURE — 74011250636 HC RX REV CODE- 250/636

## 2018-07-06 PROCEDURE — 75410000002 HC LABOR FEE PER 1 HR

## 2018-07-06 PROCEDURE — 77030011943

## 2018-07-06 PROCEDURE — 74011000250 HC RX REV CODE- 250: Performed by: OBSTETRICS & GYNECOLOGY

## 2018-07-06 PROCEDURE — 84550 ASSAY OF BLOOD/URIC ACID: CPT | Performed by: OBSTETRICS & GYNECOLOGY

## 2018-07-06 PROCEDURE — 0HQ9XZZ REPAIR PERINEUM SKIN, EXTERNAL APPROACH: ICD-10-PCS | Performed by: OBSTETRICS & GYNECOLOGY

## 2018-07-06 PROCEDURE — 74011250637 HC RX REV CODE- 250/637: Performed by: OBSTETRICS & GYNECOLOGY

## 2018-07-06 RX ORDER — SODIUM CHLORIDE 0.9 % (FLUSH) 0.9 %
5-10 SYRINGE (ML) INJECTION AS NEEDED
Status: DISCONTINUED | OUTPATIENT
Start: 2018-07-06 | End: 2018-07-08 | Stop reason: HOSPADM

## 2018-07-06 RX ORDER — LABETALOL 200 MG/1
200 TABLET, FILM COATED ORAL 2 TIMES DAILY
Status: DISCONTINUED | OUTPATIENT
Start: 2018-07-07 | End: 2018-07-06

## 2018-07-06 RX ORDER — SODIUM CHLORIDE 0.9 % (FLUSH) 0.9 %
5-10 SYRINGE (ML) INJECTION AS NEEDED
Status: DISCONTINUED | OUTPATIENT
Start: 2018-07-06 | End: 2018-07-06 | Stop reason: ALTCHOICE

## 2018-07-06 RX ORDER — SODIUM CHLORIDE 0.9 % (FLUSH) 0.9 %
5-10 SYRINGE (ML) INJECTION EVERY 8 HOURS
Status: DISCONTINUED | OUTPATIENT
Start: 2018-07-06 | End: 2018-07-08 | Stop reason: HOSPADM

## 2018-07-06 RX ORDER — OXYCODONE AND ACETAMINOPHEN 5; 325 MG/1; MG/1
1 TABLET ORAL
Status: DISCONTINUED | OUTPATIENT
Start: 2018-07-06 | End: 2018-07-08 | Stop reason: HOSPADM

## 2018-07-06 RX ORDER — DIPHENHYDRAMINE HCL 25 MG
25 CAPSULE ORAL
Status: DISCONTINUED | OUTPATIENT
Start: 2018-07-06 | End: 2018-07-08 | Stop reason: HOSPADM

## 2018-07-06 RX ORDER — FENTANYL CITRATE 50 UG/ML
INJECTION, SOLUTION INTRAMUSCULAR; INTRAVENOUS AS NEEDED
Status: DISCONTINUED | OUTPATIENT
Start: 2018-07-06 | End: 2018-07-06 | Stop reason: HOSPADM

## 2018-07-06 RX ORDER — FENTANYL CITRATE 50 UG/ML
INJECTION, SOLUTION INTRAMUSCULAR; INTRAVENOUS
Status: COMPLETED
Start: 2018-07-06 | End: 2018-07-06

## 2018-07-06 RX ORDER — LIDOCAINE HYDROCHLORIDE AND EPINEPHRINE 15; 5 MG/ML; UG/ML
INJECTION, SOLUTION EPIDURAL AS NEEDED
Status: DISCONTINUED | OUTPATIENT
Start: 2018-07-06 | End: 2018-07-06 | Stop reason: HOSPADM

## 2018-07-06 RX ORDER — LIDOCAINE HYDROCHLORIDE 10 MG/ML
1 INJECTION INFILTRATION; PERINEURAL
Status: DISCONTINUED | OUTPATIENT
Start: 2018-07-06 | End: 2018-07-06 | Stop reason: HOSPADM

## 2018-07-06 RX ORDER — BUTORPHANOL TARTRATE 1 MG/ML
1 INJECTION INTRAMUSCULAR; INTRAVENOUS
Status: DISCONTINUED | OUTPATIENT
Start: 2018-07-06 | End: 2018-07-06 | Stop reason: HOSPADM

## 2018-07-06 RX ORDER — SODIUM CHLORIDE 0.9 % (FLUSH) 0.9 %
5-10 SYRINGE (ML) INJECTION EVERY 8 HOURS
Status: DISCONTINUED | OUTPATIENT
Start: 2018-07-06 | End: 2018-07-06 | Stop reason: ALTCHOICE

## 2018-07-06 RX ORDER — FENTANYL CITRATE 50 UG/ML
100 INJECTION, SOLUTION INTRAMUSCULAR; INTRAVENOUS ONCE
Status: ACTIVE | OUTPATIENT
Start: 2018-07-06 | End: 2018-07-07

## 2018-07-06 RX ORDER — LIDOCAINE HYDROCHLORIDE 20 MG/ML
JELLY TOPICAL
Status: DISCONTINUED | OUTPATIENT
Start: 2018-07-06 | End: 2018-07-06 | Stop reason: HOSPADM

## 2018-07-06 RX ORDER — LABETALOL HYDROCHLORIDE 5 MG/ML
80 INJECTION, SOLUTION INTRAVENOUS
Status: DISCONTINUED | OUTPATIENT
Start: 2018-07-06 | End: 2018-07-07 | Stop reason: HOSPADM

## 2018-07-06 RX ORDER — MINERAL OIL
120 OIL (ML) ORAL
Status: DISCONTINUED | OUTPATIENT
Start: 2018-07-06 | End: 2018-07-06 | Stop reason: HOSPADM

## 2018-07-06 RX ORDER — DEXTROSE, SODIUM CHLORIDE, SODIUM LACTATE, POTASSIUM CHLORIDE, AND CALCIUM CHLORIDE 5; .6; .31; .03; .02 G/100ML; G/100ML; G/100ML; G/100ML; G/100ML
125 INJECTION, SOLUTION INTRAVENOUS CONTINUOUS
Status: DISCONTINUED | OUTPATIENT
Start: 2018-07-06 | End: 2018-07-06 | Stop reason: ALTCHOICE

## 2018-07-06 RX ORDER — ONDANSETRON 2 MG/ML
4 INJECTION INTRAMUSCULAR; INTRAVENOUS ONCE
Status: COMPLETED | OUTPATIENT
Start: 2018-07-06 | End: 2018-07-06

## 2018-07-06 RX ORDER — OXYTOCIN/0.9 % SODIUM CHLORIDE 15/250 ML
250 PLASTIC BAG, INJECTION (ML) INTRAVENOUS ONCE
Status: ACTIVE | OUTPATIENT
Start: 2018-07-06 | End: 2018-07-06

## 2018-07-06 RX ORDER — LABETALOL 200 MG/1
200 TABLET, FILM COATED ORAL 2 TIMES DAILY
Status: DISCONTINUED | OUTPATIENT
Start: 2018-07-06 | End: 2018-07-08 | Stop reason: HOSPADM

## 2018-07-06 RX ORDER — DOCUSATE SODIUM 100 MG/1
100 CAPSULE, LIQUID FILLED ORAL 2 TIMES DAILY
Status: DISCONTINUED | OUTPATIENT
Start: 2018-07-07 | End: 2018-07-08 | Stop reason: HOSPADM

## 2018-07-06 RX ORDER — ACETAMINOPHEN 325 MG/1
650 TABLET ORAL
Status: DISCONTINUED | OUTPATIENT
Start: 2018-07-06 | End: 2018-07-08 | Stop reason: HOSPADM

## 2018-07-06 RX ORDER — ONDANSETRON 4 MG/1
4 TABLET, ORALLY DISINTEGRATING ORAL
Status: ACTIVE | OUTPATIENT
Start: 2018-07-06 | End: 2018-07-07

## 2018-07-06 RX ORDER — ROPIVACAINE HYDROCHLORIDE 2 MG/ML
INJECTION, SOLUTION EPIDURAL; INFILTRATION; PERINEURAL
Status: DISCONTINUED | OUTPATIENT
Start: 2018-07-06 | End: 2018-07-06 | Stop reason: HOSPADM

## 2018-07-06 RX ORDER — SIMETHICONE 80 MG
80 TABLET,CHEWABLE ORAL
Status: DISCONTINUED | OUTPATIENT
Start: 2018-07-06 | End: 2018-07-08 | Stop reason: HOSPADM

## 2018-07-06 RX ORDER — SODIUM CHLORIDE, SODIUM LACTATE, POTASSIUM CHLORIDE, CALCIUM CHLORIDE 600; 310; 30; 20 MG/100ML; MG/100ML; MG/100ML; MG/100ML
INJECTION, SOLUTION INTRAVENOUS
Status: DISCONTINUED | OUTPATIENT
Start: 2018-07-06 | End: 2018-07-06 | Stop reason: HOSPADM

## 2018-07-06 RX ORDER — LABETALOL HYDROCHLORIDE 5 MG/ML
20 INJECTION, SOLUTION INTRAVENOUS ONCE
Status: COMPLETED | OUTPATIENT
Start: 2018-07-06 | End: 2018-07-06

## 2018-07-06 RX ORDER — OXYTOCIN/RINGER'S LACTATE 30/500 ML
.5-2 PLASTIC BAG, INJECTION (ML) INTRAVENOUS
Status: DISCONTINUED | OUTPATIENT
Start: 2018-07-06 | End: 2018-07-06 | Stop reason: ALTCHOICE

## 2018-07-06 RX ORDER — LABETALOL HYDROCHLORIDE 5 MG/ML
40 INJECTION, SOLUTION INTRAVENOUS
Status: COMPLETED | OUTPATIENT
Start: 2018-07-06 | End: 2018-07-06

## 2018-07-06 RX ADMIN — FENTANYL CITRATE 15 MCG: 50 INJECTION, SOLUTION INTRAMUSCULAR; INTRAVENOUS at 13:30

## 2018-07-06 RX ADMIN — LABETALOL HYDROCHLORIDE 200 MG: 200 TABLET, FILM COATED ORAL at 22:11

## 2018-07-06 RX ADMIN — LIDOCAINE HYDROCHLORIDE AND EPINEPHRINE 5 ML: 15; 5 INJECTION, SOLUTION EPIDURAL at 13:31

## 2018-07-06 RX ADMIN — SODIUM CHLORIDE, SODIUM LACTATE, POTASSIUM CHLORIDE, CALCIUM CHLORIDE, AND DEXTROSE MONOHYDRATE 125 ML/HR: 600; 310; 30; 20; 5 INJECTION, SOLUTION INTRAVENOUS at 16:23

## 2018-07-06 RX ADMIN — PENICILLIN G POTASSIUM 2.5 MILLION UNITS: 20000000 POWDER, FOR SOLUTION INTRAVENOUS at 17:37

## 2018-07-06 RX ADMIN — SODIUM CHLORIDE, SODIUM LACTATE, POTASSIUM CHLORIDE, CALCIUM CHLORIDE, AND DEXTROSE MONOHYDRATE 125 ML/HR: 600; 310; 30; 20; 5 INJECTION, SOLUTION INTRAVENOUS at 09:02

## 2018-07-06 RX ADMIN — OXYTOCIN 6 MILLI-UNITS/MIN: 10 INJECTION, SOLUTION INTRAMUSCULAR; INTRAVENOUS at 10:00

## 2018-07-06 RX ADMIN — LABETALOL HYDROCHLORIDE 40 MG: 5 INJECTION, SOLUTION INTRAVENOUS at 17:19

## 2018-07-06 RX ADMIN — ROPIVACAINE HYDROCHLORIDE 8 ML/HR: 2 INJECTION, SOLUTION EPIDURAL; INFILTRATION; PERINEURAL at 13:36

## 2018-07-06 RX ADMIN — LABETALOL HYDROCHLORIDE 20 MG: 5 INJECTION, SOLUTION INTRAVENOUS at 16:56

## 2018-07-06 RX ADMIN — OXYTOCIN 16 MILLI-UNITS/MIN: 10 INJECTION, SOLUTION INTRAMUSCULAR; INTRAVENOUS at 12:00

## 2018-07-06 RX ADMIN — OXYTOCIN 8 MILLI-UNITS/MIN: 10 INJECTION, SOLUTION INTRAMUSCULAR; INTRAVENOUS at 10:30

## 2018-07-06 RX ADMIN — FENTANYL CITRATE 85 MCG: 50 INJECTION, SOLUTION INTRAMUSCULAR; INTRAVENOUS at 13:31

## 2018-07-06 RX ADMIN — ONDANSETRON 4 MG: 2 INJECTION INTRAMUSCULAR; INTRAVENOUS at 18:00

## 2018-07-06 RX ADMIN — SODIUM CHLORIDE 5 MILLION UNITS: 900 INJECTION, SOLUTION INTRAVENOUS at 09:01

## 2018-07-06 RX ADMIN — SODIUM CHLORIDE, SODIUM LACTATE, POTASSIUM CHLORIDE, CALCIUM CHLORIDE: 600; 310; 30; 20 INJECTION, SOLUTION INTRAVENOUS at 13:24

## 2018-07-06 RX ADMIN — PENICILLIN G POTASSIUM 2.5 MILLION UNITS: 20000000 POWDER, FOR SOLUTION INTRAVENOUS at 13:30

## 2018-07-06 RX ADMIN — OXYTOCIN 14 MILLI-UNITS/MIN: 10 INJECTION, SOLUTION INTRAMUSCULAR; INTRAVENOUS at 11:30

## 2018-07-06 RX ADMIN — Medication 10 ML: at 22:13

## 2018-07-06 RX ADMIN — OXYTOCIN 4 MILLI-UNITS/MIN: 10 INJECTION, SOLUTION INTRAMUSCULAR; INTRAVENOUS at 09:30

## 2018-07-06 RX ADMIN — OXYTOCIN 10 MILLI-UNITS/MIN: 10 INJECTION, SOLUTION INTRAMUSCULAR; INTRAVENOUS at 10:45

## 2018-07-06 RX ADMIN — OXYTOCIN 12 MILLI-UNITS/MIN: 10 INJECTION, SOLUTION INTRAMUSCULAR; INTRAVENOUS at 11:00

## 2018-07-06 RX ADMIN — OXYTOCIN 2 MILLI-UNITS/MIN: 10 INJECTION, SOLUTION INTRAMUSCULAR; INTRAVENOUS at 09:00

## 2018-07-06 NOTE — PROGRESS NOTES
Pt on her L side with 'peanut ball' between her thighs to promote open hips. Pt with elevated BP's Osiel Crawley Md aware. Lungs clear bilateral and reflexes 2+ bilateral upper (lower reflexes not assessed as pt with epidural), pt denies h/a, visual changes or epigastric pain.

## 2018-07-06 NOTE — ANESTHESIA PREPROCEDURE EVALUATION
Anesthetic History   No history of anesthetic complications            Review of Systems / Medical History  Patient summary reviewed and pertinent labs reviewed    Pulmonary  Within defined limits                 Neuro/Psych   Within defined limits           Cardiovascular    Hypertension ([address]): well controlled              Exercise tolerance: >4 METS     GI/Hepatic/Renal     GERD: well controlled           Endo/Other  Within defined limits           Other Findings              Physical Exam    Airway  Mallampati: II  TM Distance: 4 - 6 cm  Neck ROM: normal range of motion   Mouth opening: Normal     Cardiovascular  Regular rate and rhythm,  S1 and S2 normal,  no murmur, click, rub, or gallop             Dental         Pulmonary  Breath sounds clear to auscultation               Abdominal  GI exam deferred       Other Findings            Anesthetic Plan    ASA: 3  Anesthesia type: CSE      Post-op pain plan if not by surgeon: intrathecal opiates and epidural opioid      Anesthetic plan and risks discussed with: Patient and Spouse

## 2018-07-06 NOTE — H&P
History & Physical    Name: Francisco J Garcia MRN: [id number]  SSN: [id number]    YOB: 1985  Age: 33 y.o. Sex: female      Subjective:     Estimated Date of Delivery: 18  OB History    Para Term  AB Living   3 0   1    SAB TAB Ectopic Molar Multiple Live Births   1           # Outcome Date GA Lbr Jesse/2nd Weight Sex Delivery Anes PTL Lv   3 Current            2             1 SAB                   Ms. Chantal Mcgovern is admitted with pregnancy at 37w5d for induction of labor due to hypertension and preeclampsia. Prenatal course was complicated by chronic hypertension. Please see prenatal records for details. Past Medical History:   Diagnosis Date    Abnormal Papanicolaou smear of cervix     LEEP 2003    Anemia     Asthma     childhood, does not use inhaler    Depression     Zoloft    Essential hypertension     Gestational hypertension      Past Surgical History:   Procedure Laterality Date    BREAST SURGERY PROCEDURE UNLISTED      Breast lift    GASTRIC BYPASS,OBESE<150CM SHAYNA-EN-Y      HX GASTRIC BYPASS      HX MASTOPEXY (BREAST LIFT)      HX OTHER SURGICAL      Tummy Tuck    HX OTHER SURGICAL      Facial surgery x 2 after dog bite     Social History     Occupational History    Not on file. Social History Main Topics    Smoking status: Former Smoker    Smokeless tobacco: Never Used    Alcohol use No      Comment: socially    Drug use: No    Sexual activity: Not on file      Comment: Nexplanon     Family History   Problem Relation Age of Onset    Anxiety Mother     Anxiety Father     Hypertension Father        No Known Allergies  Prior to Admission medications    Medication Sig Start Date End Date Taking? Authorizing Provider   LORazepam (ATIVAN) 1 mg tablet Take 0.5-1 Tabs by mouth every twelve (12) hours as needed for Anxiety.  Max Daily Amount: 2 mg. 18  Yes Sharri Blair MD   butalbital-aspirin-caffeine [facility]) capsule Take 1 Cap by mouth every four (4) hours as needed for Pain. Indications: TENSION-TYPE HEADACHE   Yes Historical Provider   oxyCODONE-acetaminophen (PERCOCET 7.5) 7.5-325 mg per tablet Take 1-2 Tabs by mouth every six (6) hours as needed. Max Daily Amount: 8 Tabs. 6/24/18   Juancarlos Bell MD   promethazine HCl (PHENERGAN PO) Take 25 mg by mouth. Historical Provider   sertraline (ZOLOFT) 50 mg tablet Take  by mouth daily. Historical Provider   ferrous sulfate (IRON) 325 mg (65 mg iron) tablet Take  by mouth Daily (before breakfast). Historical Provider   cyanocobalamin (VITAMIN B-12) 1,000 mcg tablet Take 1,000 mcg by mouth daily. Historical Provider   GLUCOSAMINE/CHONDR RUIZ A SOD (GLUCOSAMINE-CHONDROITIN) 750-600 mg tab Take  by mouth. Historical Provider        Review of Systems: A comprehensive review of systems was negative except for that written in the History of Present Illness. Objective:     Vitals:  Vitals:    07/06/18 0748   Temp: 98.5 °F (36.9 °C)        Physical Exam:  Patient without distress. Heart: Regular rate and rhythm  Lung: clear to auscultation throughout lung fields, no wheezes, no rales, no rhonchi and normal respiratory effort  Abdomen: soft, nontender  Cervical Exam: 1 cm dilated    90% effaced    -2 station    Membranes:  Intact  Fetal Heart Rate: Reactive          Prenatal Labs:   No results found for: ABORH, RUBELLAEXT, HBSAGEXT, HIVEXT, RPREXT, GONNOEXT, CHLAMEXT, ABORHEXT, RUBELLAEXT, GRBSEXT, HBSAGEXT, HIVEXT, RPREXT, GONNOEXT, CHLAMEXT    Impression/Plan:     Active Problems:    Preeclampsia, third trimester (7/6/2018)         Plan: Admit for induction of labor. Group B Strep positive.     Signed By:  Juancarlos Bell MD     July 6, 2018

## 2018-07-06 NOTE — ANESTHESIA PROCEDURE NOTES
CSE Block    Start time: 7/6/2018 1:28 PM  End time: 7/6/2018 1:31 PM  Performed by: Felicitas Clifford  Authorized by: Mirna SOTO     Pre-Procedure  Indications: at surgeon's request and primary anesthetic    preanesthetic checklist: patient identified, risks and benefits discussed, anesthesia consent, site marked, patient being monitored and timeout performed    Timeout Time: 13:27        Procedure:   Patient Position:  Seated  Prep Region:  Lumbar  Prep: chlorhexidine    Location:  L2-3    Epidural Needle:   Needle Type:  Tuohy  Needle Gauge:  18 G  Injection Technique:  Loss of resistance using saline  Attempts:  1    Spinal Needle:   Needle Type:  Quincke  Needle Gauge:  25 G    Catheter:   Catheter Type:  Open end  Catheter Size:  20 G  Catheter at Skin Depth (cm):  5  Depth in Epidural Space (cm):  5  Events: no blood with aspiration, no cerebrospinal fluid with aspiration, no paresthesia and negative aspiration test    Test Dose:  Lidocaine 1.5% w/ epi and negative    Assessment:   Catheter Secured:  Tegaderm and tape  Insertion:  Uncomplicated  Patient tolerance:  Patient tolerated the procedure well with no immediate complications

## 2018-07-06 NOTE — PROGRESS NOTES
Pt has changed her name for who to call in case of emergency to her fiance Madeline Welch.  Second is to be her mother that had been listed at first.

## 2018-07-06 NOTE — PROGRESS NOTES
Labor Progress Note  Patient seen, fetal heart rate and contraction pattern evaluated, patient examined. Patient Vitals for the past 1 hrs:   BP Pulse   07/06/18 1530 (!) 147/91 67   07/06/18 1525 133/80 65   07/06/18 1521 (!) 139/94 (!) 56   07/06/18 1515 (!) 143/98 60   07/06/18 1511 (!) 150/95 66   07/06/18 1506 150/90 60   07/06/18 1502 (!) 161/94 (!) 54   07/06/18 1457 (!) 161/97 (!) 55       Physical Exam:  Cervical Exam:  4 cm dilated    100% effaced    -1 station    Membranes:  Artificial Rupture of Membranes;  Amniotic Fluid: medium amount of clear fluid  Uterine Activity: Intensity: moderate  Fetal Heart Rate: Reactive    Assessment/Plan:  Reassuring fetal status, Continue plan for vaginal delivery

## 2018-07-06 NOTE — PROGRESS NOTES
Pt reports that she is feeling contractions but pain is only around 2-3 with num scale. BP ranges have been elevated and pt has had no headaches, blurry vision, or upper right quadrant pain.

## 2018-07-06 NOTE — IP AVS SNAPSHOT
[address] [facility] [address] [facility] 
[phone] Patient: Fanta Lucio MRN: [id number] WZX:[birth year] A check vivienne indicates which time of day the medication should be taken. My Medications START taking these medications Instructions Each Dose to Equal  
 Morning Noon Evening Bedtime  
 labetalol 200 mg tablet Commonly known as:  Marlane Angelucci Your last dose was: Your next dose is: Take 1 Tab by mouth two (2) times a day. 200 mg  
    
   
   
   
  
 oxyCODONE-acetaminophen 5-325 mg per tablet Commonly known as:  PERCOCET Replaces:  oxyCODONE-acetaminophen 7.5-325 mg per tablet Your last dose was: Your next dose is: Take 1-2 Tabs by mouth every four (4) hours as needed. Max Daily Amount: 12 Tabs. 1-2 Tab CONTINUE taking these medications Instructions Each Dose to Equal  
 Morning Noon Evening Bedtime ZOLOFT 50 mg tablet Generic drug:  sertraline Your last dose was: Your next dose is: Take  by mouth daily. STOP taking these medications   
 oxyCODONE-acetaminophen 7.5-325 mg per tablet Commonly known as:  PERCOCET 7.5 Replaced by:  oxyCODONE-acetaminophen 5-325 mg per tablet ASK your doctor about these medications Instructions Each Dose to Equal  
 Morning Noon Evening Bedtime  
 butalbital-aspirin-caffeine capsule Commonly known as:  Larwance Hait Your last dose was: Your next dose is: Take 1 Cap by mouth every four (4) hours as needed for Pain. Indications: TENSION-TYPE HEADACHE  
 1 Cap  
    
   
   
   
  
 glucosamine-chondroitin 750-600 mg Tab Your last dose was: Your next dose is: Take  by mouth. Iron 325 mg (65 mg iron) tablet Generic drug:  ferrous sulfate Your last dose was: Your next dose is: Take  by mouth Daily (before breakfast). LORazepam 1 mg tablet Commonly known as:  ATIVAN Your last dose was: Your next dose is: Take 0.5-1 Tabs by mouth every twelve (12) hours as needed for Anxiety. Max Daily Amount: 2 mg. 0.5-1 mg PHENERGAN PO Your last dose was: Your next dose is: Take 25 mg by mouth. 25 mg  
    
   
   
   
  
 VITAMIN B-12 1,000 mcg tablet Generic drug:  cyanocobalamin Your last dose was: Your next dose is: Take 1,000 mcg by mouth daily. 1000 mcg Where to Get Your Medications Information on where to get these meds will be given to you by the nurse or doctor. ! Ask your nurse or doctor about these medications  
  labetalol 200 mg tablet  
 oxyCODONE-acetaminophen 5-325 mg per tablet

## 2018-07-06 NOTE — IP AVS SNAPSHOT
[id number] 
[phone] Patient: Shawn Joseph MRN: [id number] GUR:5/17/[id number] About your hospitalization You were admitted on:  July 6, 2018 You last received care in the:  [address] [facility] You were discharged on:  July 8, 2018 Why you were hospitalized Your primary diagnosis was:  Preeclampsia, Third Trimester Your diagnoses also included:  Normal Labor Follow-up Information Follow up With Details Comments Contact Info JEANETTE George   [address] [facility] [address] 
[phone] Tim Macedo MD In 1 week  [address] [facility] [address] [facility] [address] 
[phone] Tim Macedo MD In 6 weeks  [address] [facility] [address] [facility] [address] 
[phone] Discharge Orders None A check vivienne indicates which time of day the medication should be taken. My Medications START taking these medications Instructions Each Dose to Equal  
 Morning Noon Evening Bedtime  
 labetalol 200 mg tablet Commonly known as:  William Teodoro Your last dose was: Your next dose is: Take 1 Tab by mouth two (2) times a day. 200 mg  
    
   
   
   
  
 oxyCODONE-acetaminophen 5-325 mg per tablet Commonly known as:  PERCOCET Replaces:  oxyCODONE-acetaminophen 7.5-325 mg per tablet Your last dose was: Your next dose is: Take 1-2 Tabs by mouth every four (4) hours as needed. Max Daily Amount: 12 Tabs. 1-2 Tab CONTINUE taking these medications Instructions Each Dose to Equal  
 Morning Noon Evening Bedtime ZOLOFT 50 mg tablet Generic drug:  sertraline Your last dose was: Your next dose is: Take  by mouth daily. STOP taking these medications oxyCODONE-acetaminophen 7.5-325 mg per tablet Commonly known as:  PERCOCET 7.5 Replaced by:  oxyCODONE-acetaminophen 5-325 mg per tablet ASK your doctor about these medications Instructions Each Dose to Equal  
 Morning Noon Evening Bedtime  
 butalbital-aspirin-caffeine capsule Commonly known as:  Chris Schmidt Your last dose was: Your next dose is: Take 1 Cap by mouth every four (4) hours as needed for Pain. Indications: TENSION-TYPE HEADACHE  
 1 Cap  
    
   
   
   
  
 glucosamine-chondroitin 750-600 mg Tab Your last dose was: Your next dose is: Take  by mouth. Iron 325 mg (65 mg iron) tablet Generic drug:  ferrous sulfate Your last dose was: Your next dose is: Take  by mouth Daily (before breakfast). LORazepam 1 mg tablet Commonly known as:  ATIVAN Your last dose was: Your next dose is: Take 0.5-1 Tabs by mouth every twelve (12) hours as needed for Anxiety. Max Daily Amount: 2 mg. 0.5-1 mg PHENERGAN PO Your last dose was: Your next dose is: Take 25 mg by mouth. 25 mg  
    
   
   
   
  
 VITAMIN B-12 1,000 mcg tablet Generic drug:  cyanocobalamin Your last dose was: Your next dose is: Take 1,000 mcg by mouth daily. 1000 mcg Where to Get Your Medications Information on where to get these meds will be given to you by the nurse or doctor. ! Ask your nurse or doctor about these medications  
  labetalol 200 mg tablet  
 oxyCODONE-acetaminophen 5-325 mg per tablet Opioid Education  Prescription Opioids: What You Need to Know: 
 
Prescription opioids can be used to help relieve moderate-to-severe pain and are often prescribed following a surgery or injury, or for certain health conditions. These medications can be an important part of treatment but also come with serious risks. Opioids are strong pain medicines. Examples include hydrocodone, oxycodone, fentanyl, and morphine. Heroin is an example of an illegal opioid. It is important to work with your health care provider to make sure you are getting the safest, most effective care. WHAT ARE THE RISKS AND SIDE EFFECTS OF OPIOID USE? Prescription opioids carry serious risks of addiction and overdose, especially with prolonged use. An opioid overdose, often marked by slow breathing, can cause sudden death. The use of prescription opioids can have a number of side effects as well, even when taken as directed. · Tolerance-meaning you might need to take more of a medication for the same pain relief · Physical dependence-meaning you have symptoms of withdrawal when the medication is stopped. Withdrawal symptoms can include nausea, sweating, chills, diarrhea, stomach cramps, and muscle aches. Withdrawal can last up to several weeks, depending on which drug you took and how long you took it. · Increased sensitivity to pain · Constipation · Nausea, vomiting, and dry mouth · Sleepiness and dizziness · Confusion · Depression · Low levels of testosterone that can result in lower sex drive, energy, and strength · Itching and sweating RISKS ARE GREATER WITH:      
· History of drug misuse, substance use disorder, or overdose · Mental health conditions (such as depression or anxiety) · Sleep apnea · Older age (72 years or older) · Pregnancy Avoid alcohol while taking prescription opioids. Also, unless specifically advised by your health care provider, medications to avoid include: · Benzodiazepines (such as Xanax or Valium) · Muscle relaxants (such as Soma or Flexeril) · Hypnotics (such as Ambien or Lunesta) · Other prescription opioids KNOW YOUR OPTIONS Talk to your health care provider about ways to manage your pain that don't involve prescription opioids. Some of these options may actually work better and have fewer risks and side effects. Options may include: 
· Pain relievers such as acetaminophen, ibuprofen, and naproxen · Some medications that are also used for depression or seizures · Physical therapy and exercise · Counseling to help patients learn how to cope better with triggers of pain and stress. · Application of heat or cold compress · Massage therapy · Relaxation techniques Be Informed Make sure you know the name of your medication, how much and how often to take it, and its potential risks & side effects. IF YOU ARE PRESCRIBED OPIOIDS FOR PAIN: 
· Never take opioids in greater amounts or more often than prescribed. Remember the goal is not to be pain-free but to manage your pain at a tolerable level. · Follow up with your primary care provider to: · Work together to create a plan on how to manage your pain. · Talk about ways to help manage your pain that don't involve prescription opioids. · Talk about any and all concerns and side effects. · Help prevent misuse and abuse. · Never sell or share prescription opioids · Help prevent misuse and abuse. · Store prescription opioids in a secure place and out of reach of others (this may include visitors, children, friends, and family). · Safely dispose of unused/unwanted prescription opioids: Find your community drug take-back program or your pharmacy mail-back program, or flush them down the toilet, following guidance from the Food and Drug Administration (www.fda.gov/Drugs/ResourcesForYou). · Visit www.cdc.gov/drugoverdose to learn about the risks of opioid abuse and overdose. · If you believe you may be struggling with addiction, tell your health care provider and ask for guidance or call [facility] [employer] at 3-840-467-DXHJ. Discharge Instructions Discharge instruction to follow: Activity: Pelvis rest for 6 weeks No heavy lifting over 15 lbs for 2 weeks No driving for 2 weeks No push/pull motion such as sweeping or vacuuming for 2 weeks No tub baths for 6 weeks If using angel-bottle continue to use until comfortable stopping. Change sanitary pad after each urination or bowel movement. Call MD for the following: 
    Fever over 101 F; pain not relieved by medication; foul smelling vaginal discharge or an increase in vaginal bleeding. Take medication as prescribed. Follow up with MD as order. After Your Delivery (the Postpartum Period): Care Instructions Your Care Instructions Congratulations on the birth of your baby. Like pregnancy, the  period can be a time of excitement, senia, and exhaustion. You may look at your wondrous little baby and feel happy. You may also be overwhelmed by your new sleep hours and new responsibilities. At first, babies often sleep during the days and are awake at night. They do not have a pattern or routine. They may make sudden gasps, jerk themselves awake, or look like they have crossed eyes. These are all normal, and they may even make you smile. In these first weeks after delivery, try to take good care of yourself. It may take 4 to 6 weeks to feel like yourself again, and possibly longer if you had a  birth. You will likely feel very tired for several weeks. Your days will be full of ups and downs, but lots of senia as well. Follow-up care is a key part of your treatment and safety. Be sure to make and go to all appointments, and call your doctor if you are having problems. It's also a good idea to know your test results and keep a list of the medicines you take. How can you care for yourself at home? Take care of your body after delivery · Use pads instead of tampons for the bloody flow that may last as long as 2 weeks. · Ease cramps with ibuprofen (Advil, Motrin). · Ease soreness of hemorrhoids and the area between your vagina and rectum with ice compresses or witch hazel pads. · Ease constipation by drinking lots of fluid and eating high-fiber foods. Ask your doctor about over-the-counter stool softeners. · Cleanse yourself with a gentle squeeze of warm water from a bottle instead of wiping with toilet paper. · Take a sitz bath in warm water several times a day. · Wear a good nursing bra. Ease sore and swollen breasts with warm, wet washcloths. · If you are not breastfeeding, use ice rather than heat for breast soreness. · Your period may not start for several months if you are breastfeeding. You may bleed more, and longer at first, than you did before you got pregnant. · Wait until you are healed (about 4 to 6 weeks) before you have sexual intercourse. Your doctor will tell you when it is okay to have sex. · Try not to travel with your baby for 5 or 6 weeks. If you take a long car trip, make frequent stops to walk around and stretch. Avoid exhaustion · Rest every day. Try to nap when your baby naps. · Ask another adult to be with you for a few days after delivery. · Plan for  if you have other children. · Stay flexible so you can eat at odd hours and sleep when you need to. Both you and your baby are making new schedules. · Plan small trips to get out of the house. Change can make you feel less tired. · Ask for help with housework, cooking, and shopping. Remind yourself that your job is to care for your baby. Know about help for postpartum depression · \"Baby blues\" are common for the first 1 to 2 weeks after birth. You may cry or feel sad or irritable for no reason. · Rest whenever you can. Being tired makes it harder to handle your emotions. · Go for walks with your baby. · Talk to your partner, friends, and family about your feelings. · If your symptoms last for more than a few weeks, or if you feel very depressed, ask your doctor for help. · Postpartum depression can be treated. Support groups and counseling can help. Sometimes medicine can also help. Stay healthy · Eat healthy foods so you have more energy, make good breast milk, and lose extra baby pounds. · If you breastfeed, avoid alcohol and drugs. Stay smoke-free. If you quit during pregnancy, congratulations. · Start daily exercise after 4 to 6 weeks, but rest when you feel tired. · Learn exercises to tone your belly. Do Kegel exercises to regain strength in your pelvic muscles. You can do these exercises while you stand or sit. ¨ Squeeze the same muscles you would use to stop your urine. Your belly and thighs should not move. ¨ Hold the squeeze for 3 seconds, and then relax for 3 seconds. ¨ Start with 3 seconds. Then add 1 second each week until you are able to squeeze for 10 seconds. ¨ Repeat the exercise 10 to 15 times for each session. Do three or more sessions each day. · Find a class for new mothers and new babies that has an exercise time. · If you had a  birth, give yourself a bit more time before you exercise, and be careful. When should you call for help? Call 911 anytime you think you may need emergency care. For example, call if: 
? · You passed out (lost consciousness). ?Call your doctor now or seek immediate medical care if: 
? · You have severe vaginal bleeding. This means you are passing blood clots and soaking through a pad each hour for 2 or more hours. ? · You are dizzy or lightheaded, or you feel like you may faint. ? · You have a fever. ? · You have new belly pain, or your pain gets worse. ? Watch closely for changes in your health, and be sure to contact your doctor if: 
? · Your vaginal bleeding seems to be getting heavier. ? · You have new or worse vaginal discharge. ? · You feel sad, anxious, or hopeless for more than a few days. ? · You do not get better as expected. Where can you learn more? Go to http://kyle-gabrielle.info/. Enter A461 in the search box to learn more about \"After Your Delivery (the Postpartum Period): Care Instructions. \" Current as of: March 16, 2017 Content Version: 11.4 © 6286-5438 [employer]. Care instructions adapted under license by [employer] (which disclaims liability or warranty for this information). If you have questions about a medical condition or this instruction, always ask your healthcare professional. [address] any warranty or liability for your use of this information. Source[employer] Announcement We are excited to announce that we are making your provider's discharge notes available to you in SourceDNA. You will see these notes when they are completed and signed by the physician that discharged you from your recent hospital stay. If you have any questions or concerns about any information you see in SourceDNA, please call the Health Information Department where you were seen or reach out to your Primary Care Provider for more information about your plan of care. Introducing [facility]! Romayne Duster introduces SourceDNA patient portal. Now you can access parts of your medical record, email your doctor's office, and request medication refills online. 1. In your internet browser, go to [email or web address] 2. Click on the First Time User? Click Here link in the Sign In box. You will see the New Member Sign Up page. 3. Enter your Source[employer] Access Code exactly as it appears below. You will not need to use this code after youve completed the sign-up process. If you do not sign up before the expiration date, you must request a new code.  
 
· Source[employer] Access Code: [email or web address] 
 Expires: 8/28/2018 12:14 PM 
 
4. Enter the last four digits of your Social Security Number (xxxx) and Date of Birth (mm/dd/yyyy) as indicated and click Submit. You will be taken to the next sign-up page. 5. Create a [employer] ID. This will be your Briggo login ID and cannot be changed, so think of one that is secure and easy to remember. 6. Create a Briggo password. You can change your password at any time. 7. Enter your Password Reset Question and Answer. This can be used at a later time if you forget your password. 8. Enter your e-mail address. You will receive e-mail notification when new information is available in [address] 9. Click Sign Up. You can now view and download portions of your medical record. 10. Click the Download Summary menu link to download a portable copy of your medical information. If you have questions, please visit the Frequently Asked Questions section of the Briggo website. Remember, Briggo is NOT to be used for urgent needs. For medical emergencies, dial 911. Now available from your iPhone and Android! Introducing [facility] As a [facility]ana patient, I wanted to make you aware of our electronic visit tool called [facility]son Marnia. [facility] 24/7 allows you to connect within minutes with a medical provider 24 hours a day, seven days a week via a mobile device or tablet or logging into a secure website from your computer. You can access Robson Marina from anywhere in the [place]. A virtual visit might be right for you when you have a simple condition and feel like you just dont want to get out of bed, or cant get away from work for an appointment, when your regular [facility] Quintana provider is not available (evenings, weekends or holidays), or when youre out of town and need minor care.   Electronic visits cost only $49 and if the [facility]son Marina provider determines a prescription is needed to treat your condition, one can be electronically transmitted to a nearby pharmacy*. Please take a moment to enroll today if you have not already done so. The enrollment process is free and takes just a few minutes. To enroll, please download the [employer] 24/7 giuliano to your tablet or phone, or visit [email or web address] to enroll on your computer. And, as an [address] [place] [address] patient with a [employer] account, the results of your visits will be scanned into your electronic medical record and your primary care provider will be able to view the scanned results. We urge you to continue to see your regular [facility] provider for your ongoing medical care. And while your primary care provider may not be the one available when you seek a eegoes virtual visit, the peace of mind you get from getting a real diagnosis real time can be priceless. For more information on eegoes, view our Frequently Asked Questions (FAQs) at [email or web address]. Sincerely, 
 
Jozef Upton MD 
Chief Medical Officer [facility] Solange Farah *:  certain medications cannot be prescribed via eegoes Providers Seen During Your Hospitalization Provider Specialty Primary office phone Rogelio Diaz MD Obstetrics & Gynecology [phone] Immunizations Administered for This Admission Name Date MMR 7/8/2018 Your Primary Care Physician (PCP) Primary Care Physician Office Phone Office Fax [address] [facility] You are allergic to the following No active allergies Recent Documentation Breastfeeding? OB Status Smoking Status Unknown Recent pregnancy Former Smoker Emergency Contacts Name Discharge Info Relation Home Work Mobile Jus Chen  Boyfrdennisd [17] [phone] Patient Belongings The following personal items are in your possession at time of discharge: Dental Appliances: None  Visual Aid: Glasses      Home Medications: None   Jewelry: None  Clothing: At bedside    Other Valuables: Cell Phone Please provide this summary of care documentation to your next provider. Signatures-by signing, you are acknowledging that this After Visit Summary has been reviewed with you and you have received a copy. Patient Signature:  ____________________________________________________________ Date:  ____________________________________________________________  
  
[facility] Provider Signature:  ____________________________________________________________ Date:  ____________________________________________________________

## 2018-07-06 NOTE — PROGRESS NOTES
Pt admitted to Room 434. Admission assessment completed. Discussed plan of care with patient. IV started, Consents witnessed. Lab work drawn, sent to lab. Reviewed \"pain goal\" with patient, explaining realistic expectations for pain relief.

## 2018-07-06 NOTE — PROGRESS NOTES
Labor Progress Note  Patient seen, fetal heart rate and contraction pattern evaluated, patient examined. Patient Vitals for the past 1 hrs:   BP Temp Pulse   07/06/18 1344 141/87 - 98   07/06/18 1342 (!) 162/91 - 97   07/06/18 1340 (!) 149/96 98.6 °F (37 °C) 95   07/06/18 1339 (!) 153/97 - 88   07/06/18 1338 (!) 144/94 - 78   07/06/18 1337 (!) 146/92 - 77   07/06/18 1336 (!) 156/96 - 75   07/06/18 1334 (!) 163/94 - 85   07/06/18 1333 (!) 175/106 - 80   07/06/18 1327 (!) 178/98 - 75   07/06/18 1313 (!) 170/107 - 75   07/06/18 1258 (!) 181/115 - 74       Physical Exam:  Cervical Exam:  3 cm dilated    100% effaced    -1 station    Membranes:  Artificial Rupture of Membranes;  Amniotic Fluid: small amount of clear and slightly bloody fluid  Uterine Activity: Intensity: moderate  Fetal Heart Rate: Reactive    Assessment/Plan:  Reassuring fetal status, Continue plan for vaginal delivery

## 2018-07-06 NOTE — PROGRESS NOTES
Brandy Last at bedside at 1325. ALIREZA Seay at bedside at 1325    Assisted pt to sitting up on bedside at 1325. Timeout completed at 26 with MD, ALIREZA and myself at bedside. Test dose given at 1331. Negative reaction. Pt assisted to lying back in left tilt position. See anesthesia record for details. See vital sign flow sheet for BP. Tolerated procedure well.

## 2018-07-07 PROCEDURE — 65270000029 HC RM PRIVATE

## 2018-07-07 PROCEDURE — 74011250637 HC RX REV CODE- 250/637: Performed by: OBSTETRICS & GYNECOLOGY

## 2018-07-07 RX ORDER — LABETALOL HYDROCHLORIDE 5 MG/ML
20 INJECTION, SOLUTION INTRAVENOUS ONCE
Status: ACTIVE | OUTPATIENT
Start: 2018-07-07 | End: 2018-07-07

## 2018-07-07 RX ORDER — LABETALOL HYDROCHLORIDE 5 MG/ML
40 INJECTION, SOLUTION INTRAVENOUS
Status: ACTIVE | OUTPATIENT
Start: 2018-07-07 | End: 2018-07-08

## 2018-07-07 RX ORDER — OXYCODONE AND ACETAMINOPHEN 5; 325 MG/1; MG/1
2 TABLET ORAL
Status: DISCONTINUED | OUTPATIENT
Start: 2018-07-07 | End: 2018-07-08 | Stop reason: HOSPADM

## 2018-07-07 RX ORDER — LABETALOL HYDROCHLORIDE 5 MG/ML
80 INJECTION, SOLUTION INTRAVENOUS
Status: DISCONTINUED | OUTPATIENT
Start: 2018-07-07 | End: 2018-07-08 | Stop reason: HOSPADM

## 2018-07-07 RX ADMIN — Medication 10 ML: at 04:00

## 2018-07-07 RX ADMIN — ACETAMINOPHEN 650 MG: 325 TABLET ORAL at 19:04

## 2018-07-07 RX ADMIN — DOCUSATE SODIUM 100 MG: 100 CAPSULE, LIQUID FILLED ORAL at 09:22

## 2018-07-07 RX ADMIN — WITCH HAZEL 1 PAD: 500 SOLUTION RECTAL; TOPICAL at 00:51

## 2018-07-07 RX ADMIN — Medication 1 SPRAY: at 00:50

## 2018-07-07 RX ADMIN — Medication 10 ML: at 09:24

## 2018-07-07 RX ADMIN — OXYCODONE HYDROCHLORIDE AND ACETAMINOPHEN 2 TABLET: 5; 325 TABLET ORAL at 22:10

## 2018-07-07 RX ADMIN — ACETAMINOPHEN 650 MG: 325 TABLET ORAL at 05:12

## 2018-07-07 RX ADMIN — LABETALOL HYDROCHLORIDE 200 MG: 200 TABLET, FILM COATED ORAL at 09:22

## 2018-07-07 RX ADMIN — OXYCODONE HYDROCHLORIDE AND ACETAMINOPHEN 1 TABLET: 5; 325 TABLET ORAL at 00:51

## 2018-07-07 RX ADMIN — DOCUSATE SODIUM 100 MG: 100 CAPSULE, LIQUID FILLED ORAL at 19:04

## 2018-07-07 NOTE — PROGRESS NOTES
Post delivery of placenta Pitocin rate was changed to 250ml/hr per request of Chriss Metzger Md to run per Md order.

## 2018-07-07 NOTE — LACTATION NOTE

## 2018-07-07 NOTE — LACTATION NOTE
In to see mom and infant earlier today. Reviewed the expectations of the first 24 hours as well as the second night of life. Infant had nursed prior to my visit and mom was to call when Infant awake and cueing. Had not heard from her so I followed up and mom had just nursed infant on the left breast. Mom stated that the suck felt comfortable. Mom and dad had questions about the latch, pumping and storage,etc. Answered questions and reviewed second night of life again. Lactation consultant will follow up tomorrow.

## 2018-07-07 NOTE — PROGRESS NOTES
Post-Partum Day Number 1 Progress Note Patient doing well post-partum without significant complaint. Voiding withour difficulty, normal lochia. Vitals:  Patient Vitals for the past 8 hrs: 
 BP Temp Pulse Resp  
18 0704 (!) 138/98 97.9 °F (36.6 °C) 69 18  
18 0400 138/84 98 °F (36.7 °C) 65 18 Temp (24hrs), Av.2 °F (36.8 °C), Min:97.9 °F (36.6 °C), Max:98.9 °F (37.2 °C) Vital signs stable, afebrile. Exam:  Patient without distress. Abdomen soft, fundus firm at level of umbilicus, nontender Perineum with normal lochia noted. Lower extremities are negative for swelling, cords or tenderness. Lab/Data Review: All lab results for the last 24 hours reviewed. Assessment and Plan:  Patient appears to be having uncomplicated post-partum course. Continue routine perineal care and maternal education. Plan discharge tomorrow if no problems occur.

## 2018-07-07 NOTE — PROGRESS NOTES
Assessment completed. POC reviewed with pt including MD and nursing staff rounding, lactation and medical record personnel visiting, pain management schedule and ambulation goals. Pt denies complaints at present.

## 2018-07-07 NOTE — L&D DELIVERY NOTE
Delivery Note    Obstetrician:  Ynes Lopez MD    Assistant: none    Pre-Delivery Diagnosis: Term pregnancy, Induced labor or Pregnancy complicated by: chronic HTN, superimposed preeclampsia,     Post-Delivery Diagnosis: Living  infant(s), Female or named Dominguez Sy    Intrapartum Event: None    Procedure: Spontaneous vaginal delivery    Delivery Summary    Patient: Iqra Keane MRN: [id number]  SSN: [id number]    YOB: 1985  Age: 33 y.o. Sex: female       Information for the patient's :  Amy Tipton [[id number]]       Labor Events:    Labor: No   Rupture Date: 2018   Rupture Time: 5:35 PM   Rupture Type AROM   Amniotic Fluid Volume: Moderate    Amniotic Fluid Description: Clear None   Induction: Oxytocin       Augmentation: AROM   Labor Events: None     Cervical Ripening:     None     Delivery Events:  Episiotomy: None   Laceration(s): First degree perineal;Right labial R labial and first degree   Repaired: Yes    Number of Repair Packets: 2   Suture Type and Size: Rapide 3-0     Estimated Blood Loss (ml): 250ml       Delivery Date: 2018    Delivery Time: 8:54 PM  Delivery Type: Vaginal, Spontaneous Delivery  Sex:  Female     Gestational Age: 37w6d   Delivery Clinician:  Ynes Lopez  Living Status: Living   Delivery Location: L&D            APGARS  One minute Five minutes Ten minutes   Skin color: 0   1        Heart rate: 2   2        Grimace: 2   2        Muscle tone: 1   1        Breathin   2        Totals: 7   8            Presentation: Vertex    Position: Right Occiput Anterior  Resuscitation Method:  Suctioning-bulb; Tactile Stimulation     Meconium Stained: None      Cord Vessels: 3 Vessels      Cord Events:    Cord Blood Sent?:  Yes    Blood Gases Sent?:  Yes    Placenta:  Date/Time:   8:56 PM  Removal: Expressed      Appearance: Normal;Intact     [place] Measurements:  Birth Weight: 2.75 kg      Birth Length:        Head Circumference:        Chest Circumference:       Abdominal Girth: Other Providers:   DORI Jolley;CONCHITA HOLMAN;;;;SHANTI DEL CASTILLO, Obstetrician;Primary Nurse;Primary  Nurse;Nicu Nurse;Neonatologist;Anesthesiologist;Charge Nurse;Scrub Tech           Group B Strep:   Lab Results   Component Value Date/Time    GrBStrep, External positive 2018 05:02 PM     Information for the patient's :  Estee Pennington [[id number]]   No results found for: Scott Kevin, ABORH    Lab Results   Component Value Date/Time    APH 7.277 2018 08:54 PM    APCO2 59 (H) 2018 08:54 PM    APO2 19 2018 08:54 PM    AHCO3 27 (H) 2018 08:54 PM    ABDC 1.2 2018 08:54 PM    EPHV 7.366 2018 08:54 PM    PCO2V 42 2018 08:54 PM    PO2V 31 2018 08:54 PM    HCO3V 24 2018 08:54 PM    EBDV 1.7 (L) 2018 08:54 PM    SITE CORD 2018 08:54 PM    SITE CORD 2018 08:54 PM    RSCOM n a at 2018 9 14 26 PM. Not read back. 2018 08:54 PM    RSCOM n a at 2018 9 14 48 PM. Not read back. 2018 08:54 PM               Complications:  none           Cord Blood Results:   Information for the patient's :  Estee Pennington [[id number]]   No results found for: PCTABR, PCTDIG, BILI, ABORH    Prenatal Labs:     Lab Results   Component Value Date/Time    ABO/Rh(D) O POSITIVE 2018 08:29 AM    GrBStrep, External positive 2018 05:02 PM      Controlled Delivery, bulb suction, delayed cord clamping, placenta intact, UE clear, Left sided internal tear/1st degree and Right inner labia with repair.      Attending Attestation: I was present and scrubbed for the entire procedure    Signed By:  Emilia Fay MD     2018

## 2018-07-07 NOTE — PROGRESS NOTES
SBAR OUT Report: Mother Pt was straight cath as legs still per pt \"heavy feeling\" from epidural. Kalyani-care done and pt able to pivot from bed to [facility] to transfer to MIU. Verbal report given to Rena Veloz RN on this patient, who is now being transferred to MIU  (unit) for routine progression of care. The patient is not wearing a green \"Anesthesia-Duramorph\" band. Report consisted of patient's Situation, Background, Assessment and Recommendations (SBAR). [place] ID bands were compared with the identification form, and verified with the patient and receiving nurse. Information from the SBAR, Intake/Output, MAR and Recent Results

## 2018-07-07 NOTE — ANESTHESIA POSTPROCEDURE EVALUATION
Post-Anesthesia Evaluation and Assessment    Patient: Antonia Carlson MRN: [id number]  SSN: [id number]    YOB: 1985  Age: 33 y.o. Sex: female       Cardiovascular Function/Vital Signs  Visit Vitals    /71    Pulse 82    Temp 37.2 °C (98.9 °F)    Resp 18    SpO2 100%    Breastfeeding Unknown       Patient is status post CSE anesthesia for * No procedures listed *. Nausea/Vomiting: None    Postoperative hydration reviewed and adequate. Pain:  Pain Scale 1: Numeric (0 - 10) (07/06/18 0748)  Pain Intensity 1: 0 (07/06/18 0748)   Managed    Neurological Status: At baseline    Mental Status and Level of Consciousness: Arousable    Pulmonary Status:   O2 Device: Room air (07/06/18 2054)   Adequate oxygenation and airway patent    Complications related to anesthesia: None    Post-anesthesia assessment completed.  No concerns    Signed By: Jim Bradford MD     July 6, 2018

## 2018-07-07 NOTE — PROGRESS NOTES
Admission assessment complete. Patient assisted OOB to bathroom to void. Gait steady. Patient voids 100 mL of urine. Kalyani care taught and demonstrated. Tucks and dermoplast provided. Educated on use. Educated on use of I/O sheet for duration of stay given history of Pre-Eclampsia. Voices understanding. Percocet 5 mg given po per request.  Denies any further needs. Encouraged to call should that change.

## 2018-07-07 NOTE — ROUTINE PROCESS
SBAR IN Report: Mother Verbal report received from Giovanni Gates RN on this patient, who is now being transferred from L&D for routine progression of care. The patient is not wearing a green \"Anesthesia-Duramorph\" band. Report consisted of patient's Situation, Background, Assessment and Recommendations (SBAR). [place] ID bands were compared with the identification form, and verified with the patient and transferring nurse. Information from the SBAR and the [place] Report was reviewed with the transferring nurse; opportunity for questions and clarification provided.

## 2018-07-07 NOTE — PROGRESS NOTES
Report received from [facility], and [facility]. Bedside report completed. Pt denies complaints at present.

## 2018-07-07 NOTE — PROGRESS NOTES
Delivery viable male , which was placed skin to skin with pt after delayed cord clamping. POC routine vaginal delivery care with BP monitoring.  Pt states her feeding plan is to breast feed,  latched well with assist from this RN to R breast.

## 2018-07-08 VITALS
RESPIRATION RATE: 18 BRPM | OXYGEN SATURATION: 87 % | HEART RATE: 60 BPM | TEMPERATURE: 98.1 F | DIASTOLIC BLOOD PRESSURE: 85 MMHG | SYSTOLIC BLOOD PRESSURE: 141 MMHG

## 2018-07-08 PROCEDURE — 74011250636 HC RX REV CODE- 250/636: Performed by: OBSTETRICS & GYNECOLOGY

## 2018-07-08 PROCEDURE — 74011250637 HC RX REV CODE- 250/637: Performed by: OBSTETRICS & GYNECOLOGY

## 2018-07-08 PROCEDURE — 90707 MMR VACCINE SC: CPT | Performed by: OBSTETRICS & GYNECOLOGY

## 2018-07-08 RX ORDER — LABETALOL 200 MG/1
200 TABLET, FILM COATED ORAL 2 TIMES DAILY
Qty: 60 TAB | Refills: 2 | Status: SHIPPED | OUTPATIENT
Start: 2018-07-08 | End: 2018-12-17

## 2018-07-08 RX ORDER — OXYCODONE AND ACETAMINOPHEN 5; 325 MG/1; MG/1
1-2 TABLET ORAL
Qty: 20 TAB | Refills: 0 | Status: SHIPPED | OUTPATIENT
Start: 2018-07-08 | End: 2018-12-17

## 2018-07-08 RX ADMIN — ACETAMINOPHEN 650 MG: 325 TABLET ORAL at 09:28

## 2018-07-08 RX ADMIN — MEASLES, MUMPS, AND RUBELLA VIRUS VACCINE LIVE 0.5 ML: 1000; 12500; 1000 INJECTION, POWDER, LYOPHILIZED, FOR SUSPENSION SUBCUTANEOUS at 09:28

## 2018-07-08 RX ADMIN — OXYCODONE HYDROCHLORIDE AND ACETAMINOPHEN 2 TABLET: 5; 325 TABLET ORAL at 04:02

## 2018-07-08 RX ADMIN — LABETALOL HYDROCHLORIDE 200 MG: 200 TABLET, FILM COATED ORAL at 09:28

## 2018-07-08 NOTE — LACTATION NOTE
In to see mom and infant prior to discharge to home. Mom was getting ready to walk out of the room as I entered. Reviewed with mom and dad the discharge information and answered questions. Mom stated that infant cluster fed during the night and she is latching and nursing well. Mom and infant are following up with [address] [place] [address] and will see lactation consultant there.

## 2018-07-08 NOTE — LACTATION NOTE
Mom and baby are going home today. Continue to offer the breast without restriction. Mom's milk should be fully in over the next few days. Reviewed engorgement precautions. Hand Expression has been demoed and written hand-out reviewed. As milk comes in baby will be more alert at the breast and swallows will be more obvious. Breasts may feel softer once baby has finished nursing. Baby should be back to birth weight by 3weeks of age. And then gain on average 1 oz per day for the next 2-3 months. Reviewed babies should be exclusively breastfeeding for the first 6 months and that breastfeeding should continue after introduction of appropriate complimentary foods after 6 months. Initial output should be at least 1 wet and 1 bowel movement for each day old baby is. By day 5-7 once milk is fully in baby will consistently have 6 or more soaking wet diapers and about 4 bowel movement. Some babies have a bowel movement with every feeding and some have 1-3 large bowel movements each day. Inadequate output may indicate inadequate feedings and should be reported to your Pediatrician. Bowel habits may change as baby gets older. Encouraged follow-up at Pediatrician in 1-2 days, no later than 1 week of age. Call [facility] for any questions as needed or to set up an OP visit. OP phone calls are returned within 24 hours. Community Breastfeeding Resource List given.

## 2018-07-08 NOTE — DISCHARGE SUMMARY
Obstetrical Discharge Summary     Name: Janine Galloway MRN: [id number]  SSN: [id number]    YOB: 1985  Age: 33 y.o. Sex: female      Allergies: Review of patient's allergies indicates no known allergies. Admit Date: 2018    Discharge Date: 2018     Admitting Physician: Miri Torres MD     Attending Physician:  Miri Torres MD     * Admission Diagnoses: Induction  Preeclampsia, third trimester  Normal labor    * Discharge Diagnoses:   Information for the patient's :  Clifford Martinez [[id number]]   Delivery of a 2.75 kg female infant via Vaginal, Spontaneous Delivery on 2018 at 8:54 PM  by . Apgars were 7 and 8. Additional Diagnoses:   Hospital Problems as of 2018  Date Reviewed: 2018          Codes Class Noted - Resolved POA    * (Principal)Preeclampsia, third trimester ICD-10-CM: O14.93  ICD-9-CM: 642.43  2018 - Present Unknown        Normal labor ICD-10-CM: O80, Z37.9  ICD-9-CM: 027  2018 - Present Unknown             Lab Results   Component Value Date/Time    ABO/Rh(D) O POSITIVE 2018 08:29 AM    DevinBStrep, External positive 2018 05:02 PM      There is no immunization history on file for this patient.     * Procedures:   * No surgery found *      [place]  Depression Scale  I have been able to laugh and see the funny side of things: As much as I always could  I have looked forward with enjoyment to things: As much as I ever did  I have blamed myself unnecessarily when things went wrong: Not very often  I have been anxious or worried for no good reason: Hardly ever  I have felt scared or panicky for no very good reason: No, not much  Things have been getting on top of me: No, most of the time I have coped quite well  I have been so unhappy that I have had difficulty sleeping: No, not at all  I have felt sad or miserable: Not very often  I have been so unhappy that I have been crying: No, never  The thought of harming myself has occurred to me: Never  Total Score: 5    * Discharge Condition: good and stable    * Hospital Course: Normal hospital course following the delivery. * Disposition: Home    Discharge Medications:   Current Discharge Medication List      START taking these medications    Details   labetalol (NORMODYNE) 200 mg tablet Take 1 Tab by mouth two (2) times a day. Qty: 60 Tab, Refills: 2      oxyCODONE-acetaminophen (PERCOCET) 5-325 mg per tablet Take 1-2 Tabs by mouth every four (4) hours as needed. Max Daily Amount: 12 Tabs. Qty: 20 Tab, Refills: 0    Associated Diagnoses: Normal labor         CONTINUE these medications which have NOT CHANGED    Details   sertraline (ZOLOFT) 50 mg tablet Take  by mouth daily. Associated Diagnoses: Iron deficiency anemia, unspecified iron deficiency anemia type         STOP taking these medications       oxyCODONE-acetaminophen (PERCOCET 7.5) 7.5-325 mg per tablet Comments:   Reason for Stopping:               * Follow-up Care/Patient Instructions: Activity: Activity as tolerated  Diet: Regular Diet  Wound Care: As directed    Follow-up Information     Follow up With Details Comments [facility] [address] [place] [address]             Signed By:  Rogelio Diaz MD     2018                                               Post-Partum Day Number 2 Progress/Discharge Note    Patient doing well post-partum without significant complaint. Voiding without difficulty, normal lochia, positive flatus. Vitals:  Patient Vitals for the past 8 hrs:   BP Temp Pulse Resp   18 0750 141/85 98.1 °F (36.7 °C) 60 18   18 0402 (!) 135/94 97.7 °F (36.5 °C) 60 18     Temp (24hrs), Av.1 °F (36.7 °C), Min:97.7 °F (36.5 °C), Max:98.6 °F (37 °C)      Vital signs stable, afebrile. Exam:  Patient without distress.                Abdomen soft, fundus firm at level of umbilicus, non tender               Perineum with normal lochia noted. Lower extremities are negative for swelling, cords or tenderness. Lab/Data Review: All lab results for the last 24 hours reviewed. Assessment and Plan:  Patient appears to be having uncomplicated post-partum course. Continue routine perineal care and maternal education. Plan discharge for today with follow up in our office in 6 weeks.  F/u one week for BP check

## 2018-07-08 NOTE — DISCHARGE INSTRUCTIONS
Discharge instruction to follow: Activity: Pelvis rest for 6 weeks     No heavy lifting over 15 lbs for 2 weeks     No driving for 2 weeks     No push/pull motion such as sweeping or vacuuming for 2 weeks     No tub baths for 6 weeks    If using angel-bottle continue to use until comfortable stopping. Change sanitary pad after each urination or bowel movement. Call MD for the following:      Fever over 101 F; pain not relieved by medication; foul smelling vaginal discharge or an increase in vaginal bleeding. Take medication as prescribed. Follow up with MD as order. After Your Delivery (the Postpartum Period): Care Instructions  Your Care Instructions    Congratulations on the birth of your baby. Like pregnancy, the  period can be a time of excitement, senia, and exhaustion. You may look at your wondrous little baby and feel happy. You may also be overwhelmed by your new sleep hours and new responsibilities. At first, babies often sleep during the days and are awake at night. They do not have a pattern or routine. They may make sudden gasps, jerk themselves awake, or look like they have crossed eyes. These are all normal, and they may even make you smile. In these first weeks after delivery, try to take good care of yourself. It may take 4 to 6 weeks to feel like yourself again, and possibly longer if you had a  birth. You will likely feel very tired for several weeks. Your days will be full of ups and downs, but lots of senia as well. Follow-up care is a key part of your treatment and safety. Be sure to make and go to all appointments, and call your doctor if you are having problems. It's also a good idea to know your test results and keep a list of the medicines you take. How can you care for yourself at home? Take care of your body after delivery  · Use pads instead of tampons for the bloody flow that may last as long as 2 weeks.   · Ease cramps with ibuprofen (Advil, Motrin). · Ease soreness of hemorrhoids and the area between your vagina and rectum with ice compresses or witch hazel pads. · Ease constipation by drinking lots of fluid and eating high-fiber foods. Ask your doctor about over-the-counter stool softeners. · Cleanse yourself with a gentle squeeze of warm water from a bottle instead of wiping with toilet paper. · Take a sitz bath in warm water several times a day. · Wear a good nursing bra. Ease sore and swollen breasts with warm, wet washcloths. · If you are not breastfeeding, use ice rather than heat for breast soreness. · Your period may not start for several months if you are breastfeeding. You may bleed more, and longer at first, than you did before you got pregnant. · Wait until you are healed (about 4 to 6 weeks) before you have sexual intercourse. Your doctor will tell you when it is okay to have sex. · Try not to travel with your baby for 5 or 6 weeks. If you take a long car trip, make frequent stops to walk around and stretch. Avoid exhaustion  · Rest every day. Try to nap when your baby naps. · Ask another adult to be with you for a few days after delivery. · Plan for  if you have other children. · Stay flexible so you can eat at odd hours and sleep when you need to. Both you and your baby are making new schedules. · Plan small trips to get out of the house. Change can make you feel less tired. · Ask for help with housework, cooking, and shopping. Remind yourself that your job is to care for your baby. Know about help for postpartum depression  · \"Baby blues\" are common for the first 1 to 2 weeks after birth. You may cry or feel sad or irritable for no reason. · Rest whenever you can. Being tired makes it harder to handle your emotions. · Go for walks with your baby. · Talk to your partner, friends, and family about your feelings.   · If your symptoms last for more than a few weeks, or if you feel very depressed, ask your doctor for help. · Postpartum depression can be treated. Support groups and counseling can help. Sometimes medicine can also help. Stay healthy  · Eat healthy foods so you have more energy, make good breast milk, and lose extra baby pounds. · If you breastfeed, avoid alcohol and drugs. Stay smoke-free. If you quit during pregnancy, congratulations. · Start daily exercise after 4 to 6 weeks, but rest when you feel tired. · Learn exercises to tone your belly. Do Kegel exercises to regain strength in your pelvic muscles. You can do these exercises while you stand or sit. ¨ Squeeze the same muscles you would use to stop your urine. Your belly and thighs should not move. ¨ Hold the squeeze for 3 seconds, and then relax for 3 seconds. ¨ Start with 3 seconds. Then add 1 second each week until you are able to squeeze for 10 seconds. ¨ Repeat the exercise 10 to 15 times for each session. Do three or more sessions each day. · Find a class for new mothers and new babies that has an exercise time. · If you had a  birth, give yourself a bit more time before you exercise, and be careful. When should you call for help? Call 911 anytime you think you may need emergency care. For example, call if:  ? · You passed out (lost consciousness). ?Call your doctor now or seek immediate medical care if:  ? · You have severe vaginal bleeding. This means you are passing blood clots and soaking through a pad each hour for 2 or more hours. ? · You are dizzy or lightheaded, or you feel like you may faint. ? · You have a fever. ? · You have new belly pain, or your pain gets worse. ? Watch closely for changes in your health, and be sure to contact your doctor if:  ? · Your vaginal bleeding seems to be getting heavier. ? · You have new or worse vaginal discharge. ? · You feel sad, anxious, or hopeless for more than a few days. ? · You do not get better as expected. Where can you learn more?   Go to http://kyle-gabrielle.info/. Enter A461 in the search box to learn more about \"After Your Delivery (the Postpartum Period): Care Instructions. \"  Current as of: March 16, 2017  Content Version: 11.4  © 4096-0928 [employer]. Care instructions adapted under license by [employer] (which disclaims liability or warranty for this information). If you have questions about a medical condition or this instruction, always ask your healthcare professional. [place] [address] any warranty or liability for your use of this information.

## 2018-12-18 PROBLEM — O13.3 GESTATIONAL HTN W/O SIGNIFICANT PROTEINURIA, THIRD TRIMESTER: Status: RESOLVED | Noted: 2018-06-29 | Resolved: 2018-12-18

## 2018-12-18 PROBLEM — Z37.9 NORMAL LABOR: Status: RESOLVED | Noted: 2018-07-06 | Resolved: 2018-12-18

## 2018-12-18 PROBLEM — O26.893 HEADACHE IN PREGNANCY, ANTEPARTUM, THIRD TRIMESTER: Status: RESOLVED | Noted: 2018-06-29 | Resolved: 2018-12-18

## 2018-12-18 PROBLEM — O14.93 PREECLAMPSIA, THIRD TRIMESTER: Status: RESOLVED | Noted: 2018-07-06 | Resolved: 2018-12-18

## 2018-12-18 PROBLEM — R51.9 HEADACHE IN PREGNANCY, ANTEPARTUM, THIRD TRIMESTER: Status: RESOLVED | Noted: 2018-06-29 | Resolved: 2018-12-18

## 2018-12-18 PROBLEM — O16.3 HYPERTENSION AFFECTING PREGNANCY IN THIRD TRIMESTER: Status: RESOLVED | Noted: 2018-06-23 | Resolved: 2018-12-18

## 2019-01-29 ENCOUNTER — HOSPITAL ENCOUNTER (OUTPATIENT)
Dept: LAB | Age: 34
Discharge: HOME | End: 2019-01-29
Payer: COMMERCIAL

## 2019-01-29 DIAGNOSIS — R53.83 FATIGUE, UNSPECIFIED TYPE: ICD-10-CM

## 2019-01-29 DIAGNOSIS — D50.9 IRON DEFICIENCY ANEMIA, UNSPECIFIED IRON DEFICIENCY ANEMIA TYPE: ICD-10-CM

## 2019-01-29 LAB
ABO + RH BLD: NORMAL
APTT PPP: 27.2 SEC (ref 24.7–39.8)
BASOPHILS # BLD: 0 K/UL (ref 0–0.2)
BASOPHILS NFR BLD: 0 % (ref 0–2)
DIFFERENTIAL METHOD BLD: ABNORMAL
EOSINOPHIL # BLD: 0 K/UL (ref 0–0.8)
EOSINOPHIL NFR BLD: 1 % (ref 0.5–7.8)
ERYTHROCYTE [DISTWIDTH] IN BLOOD BY AUTOMATED COUNT: 12.9 % (ref 11.9–14.6)
FERRITIN SERPL-MCNC: 32 NG/ML (ref 8–388)
FIBRINOGEN PPP-MCNC: 283 MG/DL (ref 190–501)
HCT VFR BLD AUTO: 46.2 % (ref 35.8–46.3)
HGB BLD-MCNC: 15.7 G/DL (ref 11.7–15.4)
HGB RETIC QN AUTO: 36 PG (ref 29–35)
IMM GRANULOCYTES # BLD AUTO: 0 K/UL (ref 0–0.5)
IMM GRANULOCYTES NFR BLD AUTO: 0 % (ref 0–5)
IMM RETICS NFR: 6 % (ref 3–15.9)
INR PPP: 1
IRON SATN MFR SERPL: 34 %
IRON SERPL-MCNC: 123 UG/DL (ref 35–150)
LYMPHOCYTES # BLD: 2.1 K/UL (ref 0.5–4.6)
LYMPHOCYTES NFR BLD: 26 % (ref 13–44)
MCH RBC QN AUTO: 31.1 PG (ref 26.1–32.9)
MCHC RBC AUTO-ENTMCNC: 34 G/DL (ref 31.4–35)
MCV RBC AUTO: 91.5 FL (ref 79.6–97.8)
MONOCYTES # BLD: 0.5 K/UL (ref 0.1–1.3)
MONOCYTES NFR BLD: 6 % (ref 4–12)
NEUTS SEG # BLD: 5.6 K/UL (ref 1.7–8.2)
NEUTS SEG NFR BLD: 67 % (ref 43–78)
NRBC # BLD: 0 K/UL (ref 0–0.2)
PLATELET # BLD AUTO: 325 K/UL (ref 150–450)
PMV BLD AUTO: 9.6 FL (ref 9.4–12.3)
PROTHROMBIN TIME: 12.7 SEC (ref 11.7–14.5)
RBC # BLD AUTO: 5.05 M/UL (ref 4.05–5.25)
RETICS # AUTO: 0.09 M/UL (ref 0.03–0.1)
RETICS/RBC NFR AUTO: 1.7 % (ref 0.3–2)
TIBC SERPL-MCNC: 361 UG/DL (ref 250–450)
WBC # BLD AUTO: 8.3 K/UL (ref 4.3–11.1)

## 2019-01-29 PROCEDURE — 82728 ASSAY OF FERRITIN: CPT

## 2019-01-29 PROCEDURE — 85025 COMPLETE CBC W/AUTO DIFF WBC: CPT

## 2019-01-29 PROCEDURE — 85670 THROMBIN TIME PLASMA: CPT

## 2019-01-29 PROCEDURE — 84439 ASSAY OF FREE THYROXINE: CPT

## 2019-01-29 PROCEDURE — 85384 FIBRINOGEN ACTIVITY: CPT

## 2019-01-29 PROCEDURE — 85046 RETICYTE/HGB CONCENTRATE: CPT

## 2019-01-29 PROCEDURE — 84443 ASSAY THYROID STIM HORMONE: CPT

## 2019-01-29 PROCEDURE — 86900 BLOOD TYPING SEROLOGIC ABO: CPT

## 2019-01-29 PROCEDURE — 85730 THROMBOPLASTIN TIME PARTIAL: CPT

## 2019-01-29 PROCEDURE — 36415 COLL VENOUS BLD VENIPUNCTURE: CPT

## 2019-01-29 PROCEDURE — 85610 PROTHROMBIN TIME: CPT

## 2019-01-29 PROCEDURE — 83540 ASSAY OF IRON: CPT

## 2019-01-29 PROCEDURE — 85240 CLOT FACTOR VIII AHG 1 STAGE: CPT

## 2019-01-30 ENCOUNTER — HOSPITAL ENCOUNTER (OUTPATIENT)
Dept: LAB | Age: 34
Discharge: HOME | End: 2019-01-30

## 2019-01-30 LAB
T4 FREE SERPL-MCNC: 1.2 NG/DL (ref 0.78–1.46)
TSH SERPL DL<=0.005 MIU/L-ACNC: 1.37 UIU/ML (ref 0.36–3.74)

## 2019-02-04 LAB
TT IMM BOVINE THROMBIN PPP: NORMAL S
VONWILLEBRAND PROFILE, XMVWPT: NORMAL

## 2019-07-18 ENCOUNTER — HOSPITAL ENCOUNTER (OUTPATIENT)
Dept: LAB | Age: 34
Discharge: HOME | End: 2019-07-18
Payer: COMMERCIAL

## 2019-07-18 DIAGNOSIS — Z86.39 HISTORY OF IRON DEFICIENCY: ICD-10-CM

## 2019-07-18 DIAGNOSIS — R53.83 FATIGUE, UNSPECIFIED TYPE: ICD-10-CM

## 2019-07-18 DIAGNOSIS — D58.2 ELEVATED HEMOGLOBIN (HCC): ICD-10-CM

## 2019-07-18 LAB
ALBUMIN SERPL-MCNC: 2.9 G/DL (ref 3.5–5)
ALBUMIN/GLOB SERPL: 0.8 {RATIO} (ref 1.2–3.5)
ALP SERPL-CCNC: 60 U/L (ref 50–136)
ALT SERPL-CCNC: 19 U/L (ref 12–65)
ANION GAP SERPL CALC-SCNC: 9 MMOL/L (ref 7–16)
AST SERPL-CCNC: 12 U/L (ref 15–37)
BASOPHILS # BLD: 0 K/UL (ref 0–0.2)
BASOPHILS NFR BLD: 0 % (ref 0–2)
BILIRUB SERPL-MCNC: 0.3 MG/DL (ref 0.2–1.1)
BUN SERPL-MCNC: 10 MG/DL (ref 6–23)
CALCIUM SERPL-MCNC: 8.3 MG/DL (ref 8.3–10.4)
CHLORIDE SERPL-SCNC: 105 MMOL/L (ref 98–107)
CO2 SERPL-SCNC: 23 MMOL/L (ref 21–32)
CREAT SERPL-MCNC: 0.64 MG/DL (ref 0.6–1)
DIFFERENTIAL METHOD BLD: NORMAL
EOSINOPHIL # BLD: 0.1 K/UL (ref 0–0.8)
EOSINOPHIL NFR BLD: 1 % (ref 0.5–7.8)
ERYTHROCYTE [DISTWIDTH] IN BLOOD BY AUTOMATED COUNT: 13.1 % (ref 11.9–14.6)
FERRITIN SERPL-MCNC: 7 NG/ML (ref 8–388)
GLOBULIN SER CALC-MCNC: 3.5 G/DL (ref 2.3–3.5)
GLUCOSE SERPL-MCNC: 80 MG/DL (ref 65–100)
HCT VFR BLD AUTO: 38.6 % (ref 35.8–46.3)
HGB BLD-MCNC: 13 G/DL (ref 11.7–15.4)
HGB RETIC QN AUTO: 36 PG (ref 29–35)
IMM GRANULOCYTES # BLD AUTO: 0 K/UL (ref 0–0.5)
IMM GRANULOCYTES NFR BLD AUTO: 0 % (ref 0–5)
IMM RETICS NFR: 13 % (ref 3–15.9)
IRON SATN MFR SERPL: 21 %
IRON SERPL-MCNC: 90 UG/DL (ref 35–150)
LYMPHOCYTES # BLD: 1.9 K/UL (ref 0.5–4.6)
LYMPHOCYTES NFR BLD: 19 % (ref 13–44)
MCH RBC QN AUTO: 30.3 PG (ref 26.1–32.9)
MCHC RBC AUTO-ENTMCNC: 33.7 G/DL (ref 31.4–35)
MCV RBC AUTO: 90 FL (ref 79.6–97.8)
MONOCYTES # BLD: 0.6 K/UL (ref 0.1–1.3)
MONOCYTES NFR BLD: 6 % (ref 4–12)
NEUTS SEG # BLD: 7.2 K/UL (ref 1.7–8.2)
NEUTS SEG NFR BLD: 74 % (ref 43–78)
NRBC # BLD: 0 K/UL (ref 0–0.2)
PLATELET # BLD AUTO: 257 K/UL (ref 150–450)
PMV BLD AUTO: 9.5 FL (ref 9.4–12.3)
POTASSIUM SERPL-SCNC: 4.1 MMOL/L (ref 3.5–5.1)
PROT SERPL-MCNC: 6.4 G/DL (ref 6.3–8.2)
RBC # BLD AUTO: 4.29 M/UL (ref 4.05–5.25)
RETICS # AUTO: 0.09 M/UL (ref 0.03–0.1)
RETICS/RBC NFR AUTO: 2.1 % (ref 0.3–2)
SODIUM SERPL-SCNC: 137 MMOL/L (ref 136–145)
TIBC SERPL-MCNC: 436 UG/DL (ref 250–450)
WBC # BLD AUTO: 9.8 K/UL (ref 4.3–11.1)

## 2019-07-18 PROCEDURE — 36415 COLL VENOUS BLD VENIPUNCTURE: CPT

## 2019-07-18 PROCEDURE — 80053 COMPREHEN METABOLIC PANEL: CPT

## 2019-07-18 PROCEDURE — 85046 RETICYTE/HGB CONCENTRATE: CPT

## 2019-07-18 PROCEDURE — 85025 COMPLETE CBC W/AUTO DIFF WBC: CPT

## 2019-07-18 PROCEDURE — 83540 ASSAY OF IRON: CPT

## 2019-07-18 PROCEDURE — 82728 ASSAY OF FERRITIN: CPT

## 2019-08-30 ENCOUNTER — HOSPITAL ENCOUNTER (INPATIENT)
Age: 34
LOS: 1 days | Discharge: ANOTHER HOSPITAL | DRG: 833 | End: 2019-08-31
Attending: OBSTETRICS & GYNECOLOGY | Admitting: OBSTETRICS & GYNECOLOGY
Payer: COMMERCIAL

## 2019-08-30 PROBLEM — O16.3 ELEVATED BLOOD PRESSURE AFFECTING PREGNANCY IN THIRD TRIMESTER, ANTEPARTUM: Status: ACTIVE | Noted: 2019-08-30

## 2019-08-30 LAB
ALBUMIN SERPL-MCNC: 2.8 G/DL (ref 3.5–5)
ALBUMIN/GLOB SERPL: 0.8 {RATIO} (ref 1.2–3.5)
ALP SERPL-CCNC: 98 U/L (ref 50–130)
ALT SERPL-CCNC: 18 U/L (ref 12–65)
ANION GAP SERPL CALC-SCNC: 8 MMOL/L (ref 7–16)
APPEARANCE UR: CLEAR
AST SERPL-CCNC: 16 U/L (ref 15–37)
BACTERIA URNS QL MICRO: ABNORMAL /HPF
BASOPHILS # BLD: 0 K/UL (ref 0–0.2)
BASOPHILS NFR BLD: 0 % (ref 0–2)
BILIRUB SERPL-MCNC: 0.2 MG/DL (ref 0.2–1.1)
BILIRUB UR QL: NEGATIVE
BUN SERPL-MCNC: 8 MG/DL (ref 6–23)
CALCIUM SERPL-MCNC: 8.6 MG/DL (ref 8.3–10.4)
CASTS URNS QL MICRO: ABNORMAL /LPF
CHLORIDE SERPL-SCNC: 106 MMOL/L (ref 98–107)
CO2 SERPL-SCNC: 24 MMOL/L (ref 21–32)
COLOR UR: YELLOW
CREAT SERPL-MCNC: 0.56 MG/DL (ref 0.6–1)
CREAT UR-MCNC: 114 MG/DL
DIFFERENTIAL METHOD BLD: ABNORMAL
EOSINOPHIL # BLD: 0 K/UL (ref 0–0.8)
EOSINOPHIL NFR BLD: 0 % (ref 0.5–7.8)
EPI CELLS #/AREA URNS HPF: ABNORMAL /HPF
ERYTHROCYTE [DISTWIDTH] IN BLOOD BY AUTOMATED COUNT: 12.9 % (ref 11.9–14.6)
GLOBULIN SER CALC-MCNC: 3.7 G/DL (ref 2.3–3.5)
GLUCOSE SERPL-MCNC: 80 MG/DL (ref 65–100)
GLUCOSE UR STRIP.AUTO-MCNC: NEGATIVE MG/DL
GLUCOSE, GLUUPC: NEGATIVE
HCT VFR BLD AUTO: 35.9 % (ref 35.8–46.3)
HGB BLD-MCNC: 12.3 G/DL (ref 11.7–15.4)
HGB UR QL STRIP: NEGATIVE
IMM GRANULOCYTES # BLD AUTO: 0.1 K/UL (ref 0–0.5)
IMM GRANULOCYTES NFR BLD AUTO: 1 % (ref 0–5)
KETONES UR QL STRIP.AUTO: ABNORMAL MG/DL
KETONES UR-MCNC: NORMAL MG/DL
LDH SERPL L TO P-CCNC: 156 U/L (ref 100–190)
LEUKOCYTE ESTERASE UR QL STRIP.AUTO: ABNORMAL
LYMPHOCYTES # BLD: 2.2 K/UL (ref 0.5–4.6)
LYMPHOCYTES NFR BLD: 16 % (ref 13–44)
MCH RBC QN AUTO: 30.2 PG (ref 26.1–32.9)
MCHC RBC AUTO-ENTMCNC: 34.3 G/DL (ref 31.4–35)
MCV RBC AUTO: 88.2 FL (ref 79.6–97.8)
MONOCYTES # BLD: 0.7 K/UL (ref 0.1–1.3)
MONOCYTES NFR BLD: 6 % (ref 4–12)
NEUTS SEG # BLD: 10.2 K/UL (ref 1.7–8.2)
NEUTS SEG NFR BLD: 77 % (ref 43–78)
NITRITE UR QL STRIP.AUTO: NEGATIVE
NRBC # BLD: 0 K/UL (ref 0–0.2)
PH UR STRIP: 7.5 [PH] (ref 5–9)
PLATELET # BLD AUTO: 287 K/UL (ref 150–450)
PMV BLD AUTO: 9.6 FL (ref 9.4–12.3)
POTASSIUM SERPL-SCNC: 3.8 MMOL/L (ref 3.5–5.1)
PROT SERPL-MCNC: 6.5 G/DL (ref 6.3–8.2)
PROT UR QL: NORMAL
PROT UR STRIP-MCNC: NEGATIVE MG/DL
PROT UR-MCNC: 24 MG/DL
PROT/CREAT UR-RTO: 0.2
RBC # BLD AUTO: 4.07 M/UL (ref 4.05–5.2)
RBC #/AREA URNS HPF: ABNORMAL /HPF
SODIUM SERPL-SCNC: 138 MMOL/L (ref 136–145)
SP GR UR REFRACTOMETRY: 1.02 (ref 1–1.02)
URATE SERPL-MCNC: 3.1 MG/DL (ref 2.6–6)
UROBILINOGEN UR QL STRIP.AUTO: 1 EU/DL (ref 0.2–1)
WBC # BLD AUTO: 13.2 K/UL (ref 4.3–11.1)
WBC URNS QL MICRO: ABNORMAL /HPF

## 2019-08-30 PROCEDURE — 74011250636 HC RX REV CODE- 250/636: Performed by: OBSTETRICS & GYNECOLOGY

## 2019-08-30 PROCEDURE — 81001 URINALYSIS AUTO W/SCOPE: CPT

## 2019-08-30 PROCEDURE — 84156 ASSAY OF PROTEIN URINE: CPT

## 2019-08-30 PROCEDURE — 81002 URINALYSIS NONAUTO W/O SCOPE: CPT | Performed by: OBSTETRICS & GYNECOLOGY

## 2019-08-30 PROCEDURE — 85025 COMPLETE CBC W/AUTO DIFF WBC: CPT

## 2019-08-30 PROCEDURE — 99218 HC RM OBSERVATION: CPT

## 2019-08-30 PROCEDURE — 99285 EMERGENCY DEPT VISIT HI MDM: CPT

## 2019-08-30 PROCEDURE — 84550 ASSAY OF BLOOD/URIC ACID: CPT

## 2019-08-30 PROCEDURE — 75810000275 HC EMERGENCY DEPT VISIT NO LEVEL OF CARE: Performed by: EMERGENCY MEDICINE

## 2019-08-30 PROCEDURE — 36415 COLL VENOUS BLD VENIPUNCTURE: CPT

## 2019-08-30 PROCEDURE — 74011250637 HC RX REV CODE- 250/637: Performed by: OBSTETRICS & GYNECOLOGY

## 2019-08-30 PROCEDURE — 80053 COMPREHEN METABOLIC PANEL: CPT

## 2019-08-30 PROCEDURE — 83615 LACTATE (LD) (LDH) ENZYME: CPT

## 2019-08-30 PROCEDURE — 82575 CREATININE CLEARANCE TEST: CPT

## 2019-08-30 RX ORDER — ACETAMINOPHEN 325 MG/1
650 TABLET ORAL
Status: DISCONTINUED | OUTPATIENT
Start: 2019-08-30 | End: 2019-08-31

## 2019-08-30 RX ORDER — ACETAMINOPHEN 325 MG/1
650 TABLET ORAL
Status: DISCONTINUED | OUTPATIENT
Start: 2019-08-30 | End: 2019-08-30

## 2019-08-30 RX ORDER — BETAMETHASONE SODIUM PHOSPHATE AND BETAMETHASONE ACETATE 3; 3 MG/ML; MG/ML
12 INJECTION, SUSPENSION INTRA-ARTICULAR; INTRALESIONAL; INTRAMUSCULAR; SOFT TISSUE EVERY 24 HOURS
Status: COMPLETED | OUTPATIENT
Start: 2019-08-30 | End: 2019-08-31

## 2019-08-30 RX ORDER — ZOLPIDEM TARTRATE 5 MG/1
5 TABLET ORAL
Status: DISCONTINUED | OUTPATIENT
Start: 2019-08-30 | End: 2019-09-01 | Stop reason: HOSPADM

## 2019-08-30 RX ORDER — ONDANSETRON 4 MG/1
4 TABLET, ORALLY DISINTEGRATING ORAL
Status: DISCONTINUED | OUTPATIENT
Start: 2019-08-30 | End: 2019-09-01 | Stop reason: HOSPADM

## 2019-08-30 RX ORDER — SODIUM CHLORIDE 0.9 % (FLUSH) 0.9 %
5-40 SYRINGE (ML) INJECTION EVERY 8 HOURS
Status: DISCONTINUED | OUTPATIENT
Start: 2019-08-30 | End: 2019-09-01 | Stop reason: HOSPADM

## 2019-08-30 RX ORDER — BUTALBITAL, ACETAMINOPHEN AND CAFFEINE 50; 325; 40 MG/1; MG/1; MG/1
2 TABLET ORAL
Status: DISCONTINUED | OUTPATIENT
Start: 2019-08-30 | End: 2019-08-31

## 2019-08-30 RX ORDER — DIPHENHYDRAMINE HCL 25 MG
25 CAPSULE ORAL ONCE
Status: COMPLETED | OUTPATIENT
Start: 2019-08-30 | End: 2019-08-30

## 2019-08-30 RX ORDER — SODIUM CHLORIDE 0.9 % (FLUSH) 0.9 %
5-40 SYRINGE (ML) INJECTION AS NEEDED
Status: DISCONTINUED | OUTPATIENT
Start: 2019-08-30 | End: 2019-09-01 | Stop reason: HOSPADM

## 2019-08-30 RX ORDER — OXYCODONE HYDROCHLORIDE 5 MG/1
5 TABLET ORAL ONCE
Status: COMPLETED | OUTPATIENT
Start: 2019-08-30 | End: 2019-08-30

## 2019-08-30 RX ADMIN — DIPHENHYDRAMINE HYDROCHLORIDE 25 MG: 25 CAPSULE ORAL at 16:46

## 2019-08-30 RX ADMIN — BUTALBITAL, ACETAMINOPHEN AND CAFFEINE 2 TABLET: 50; 325; 40 TABLET ORAL at 20:17

## 2019-08-30 RX ADMIN — ZOLPIDEM TARTRATE 5 MG: 5 TABLET ORAL at 21:07

## 2019-08-30 RX ADMIN — BETAMETHASONE SODIUM PHOSPHATE AND BETAMETHASONE ACETATE 12 MG: 3; 3 INJECTION, SUSPENSION INTRA-ARTICULAR; INTRALESIONAL; INTRAMUSCULAR at 14:39

## 2019-08-30 RX ADMIN — OXYCODONE HYDROCHLORIDE 5 MG: 5 TABLET ORAL at 16:46

## 2019-08-30 RX ADMIN — ACETAMINOPHEN 650 MG: 325 TABLET, FILM COATED ORAL at 13:09

## 2019-08-30 NOTE — H&P
History & Physical    Name: Shane Lockhart MRN: [id number]  SSN: [id number]    YOB: 1985  Age: 29 y.o. Sex: female      Subjective:     Reason for Admission:  Pregnancy and elevated bp. History of Present Illness: Ms. Thiago Mcclain is a 29 y.o.  female with an estimated gestational age of 33w11d with Estimated Date of Delivery: 19. Patient complains of mild headache  and elevated bp for 1 day. Pregnancy has been complicated by closely spaced pregnancy and previous pregnancy with preeclampsia. Patient denies abdominal pain  , chest pain, contractions, fever, nausea and vomiting, right upper quadrant pain  , shortness of breath, vaginal bleeding  and vaginal leaking of fluid .     OB History    Para Term  AB Living   4 1 1   1 1   SAB TAB Ectopic Molar Multiple Live Births   1       0 1      # Outcome Date GA Lbr Jesse/2nd Weight Sex Delivery Anes PTL Lv   4 Current            3 Term 18 37w5d / 00:41 2.75 kg F Vag-Spont EPIDURAL AN N REAL   2             1 SAB              Past Medical History:   Diagnosis Date    Abnormal Papanicolaou smear of cervix     LEEP 2003    Anemia     Asthma     childhood, does not use inhaler    Depression     Zoloft    Essential hypertension     Gestational hypertension     Iron deficiency anemia      Past Surgical History:   Procedure Laterality Date    BREAST SURGERY PROCEDURE UNLISTED      Breast lift    GASTRIC BYPASS,OBESE<150CM SHAYNA-EN-Y      HX GASTRIC BYPASS  2013    HX MASTOPEXY (BREAST LIFT)      HX ORTHOPAEDIC Right 2012    right leg - rods and pins    HX OTHER SURGICAL      Tummy Tuck    HX OTHER SURGICAL      Facial surgery x 2 after dog bite     Social History     Occupational History    Not on file   Tobacco Use    Smoking status: Former Smoker     Last attempt to quit: 2017     Years since quittin.6    Smokeless tobacco: Never Used   Substance and Sexual Activity    Alcohol use: Yes     Comment: 2 per night    Drug use: No    Sexual activity: Not on file     Comment: Nexplanon      Family History   Problem Relation Age of Onset    Anxiety Mother     Anxiety Father     Hypertension Father     Other Other         depression, CAD, anxiety, ADD, bipolar       No Known Allergies  Prior to Admission medications    Medication Sig Start Date End Date Taking? Authorizing Provider   sertraline (ZOLOFT) 50 mg tablet TAKE 1/2 TABLET FOR 6 DAYS THEN INCREASE TO 1 TABLET IF NEEDED 6/25/19  Yes Provider, Historical   PNV66-Iron Fumarate-FA-DSS-DHA 26-1.2- mg cap Take  by mouth. Yes Provider, Historical   diphenoxylate-atropine (LOMOTIL) 2.5-0.025 mg per tablet 1 to 2 qid prn diarrhea 1/17/19   Chuck Patel MD   ondansetron (ZOFRAN ODT) 8 mg disintegrating tablet Take 1 Tab by mouth every eight (8) hours as needed for Nausea. 1/17/19   Chuck Patel MD   dextroamphetamine-amphetamine (ADDERALL) 30 mg tablet Earliest Fill Date: 1/17/19.  1 bid for ADD 1/17/19   Chuck Patel MD   dextroamphetamine-amphetamine (ADDERALL) 30 mg tablet Earliest Fill Date: 2/16/19.  1 bid for ADD   Fill after 30 days 2/16/19   Chuck Patel MD   dextroamphetamine-amphetamine (ADDERALL) 30 mg tablet Earliest Fill Date: 3/18/19.  1 bid for ADD   Fill after 60 days 3/18/19   Chuck Patel MD        Review of Systems:  A comprehensive review of systems was negative except for that written in the History of Present Illness. Objective:     Vitals:    Vitals:    08/30/19 1249 08/30/19 1311 08/30/19 1319 08/30/19 1349   BP: (!) 167/95 137/85 139/87 143/86   Pulse: 94 75 86 76   Resp:    18   SpO2:       Weight:       Height:          No data recorded. BP  Min: 137/85  Max: 167/95     Physical Exam:  Patient without distress.   Heart: Regular rate and rhythm  Lung: clear to auscultation throughout lung fields, no wheezes, no rales, no rhonchi and normal respiratory effort  Back: costovertebral angle tenderness absent  Abdomen: soft, nontender  Fundus: soft and non tender  Perineum: blood absent, amniotic fluid absent  Cervical Exam: Closed/Thick/High  Lower Extremities:  - Edema No   - Patellar Reflexes: 2+ bilaterally     Membranes:  Intact  Uterine Activity:  None  Fetal Heart Rate:  Reactive       Lab/Data Review:  Recent Results (from the past 24 hour(s))   POC URINE DIPSTICK MANUAL    Collection Time: 08/30/19 12:38 PM   Result Value Ref Range    Protein (POC) 30 mg/dL Negative    Glucose, urine (POC) Negative Negative    Ketones (POC) 15 mg/dL Negative   URINALYSIS W/ RFLX MICROSCOPIC    Collection Time: 08/30/19 12:54 PM   Result Value Ref Range    Color YELLOW      Appearance CLEAR      Specific gravity 1.019 1.001 - 1.023      pH (UA) 7.5 5.0 - 9.0      Protein NEGATIVE  NEG mg/dL    Glucose NEGATIVE  mg/dL    Ketone TRACE (A) NEG mg/dL    Bilirubin NEGATIVE  NEG      Blood NEGATIVE  NEG      Urobilinogen 1.0 0.2 - 1.0 EU/dL    Nitrites NEGATIVE  NEG      Leukocyte Esterase SMALL (A) NEG      WBC 0-3 0 /hpf    RBC 0-3 0 /hpf    Epithelial cells 5-10 0 /hpf    Bacteria TRACE 0 /hpf    Casts 0-3 0 /lpf   PROTEIN/CREATININE RATIO, URINE    Collection Time: 08/30/19 12:54 PM   Result Value Ref Range    Protein, urine random 24 mg/dL    Creatinine, urine 114.00 mg/dL    Protein/Creat. urine Ratio 0.2     CBC WITH AUTOMATED DIFF    Collection Time: 08/30/19  1:05 PM   Result Value Ref Range    WBC 13.2 (H) 4.3 - 11.1 K/uL    RBC 4.07 4.05 - 5.2 M/uL    HGB 12.3 11.7 - 15.4 g/dL    HCT 35.9 35.8 - 46.3 %    MCV 88.2 79.6 - 97.8 FL    MCH 30.2 26.1 - 32.9 PG    MCHC 34.3 31.4 - 35.0 g/dL    RDW 12.9 11.9 - 14.6 %    PLATELET 229 474 - 664 K/uL    MPV 9.6 9.4 - 12.3 FL    ABSOLUTE NRBC 0.00 0.0 - 0.2 K/uL    DF AUTOMATED      NEUTROPHILS 77 43 - 78 %    LYMPHOCYTES 16 13 - 44 %    MONOCYTES 6 4.0 - 12.0 %    EOSINOPHILS 0 (L) 0.5 - 7.8 %    BASOPHILS 0 0.0 - 2.0 %    IMMATURE GRANULOCYTES 1 0.0 - 5.0 %    ABS. NEUTROPHILS 10.2 (H) 1.7 - 8.2 K/UL    ABS. LYMPHOCYTES 2.2 0.5 - 4.6 K/UL    ABS. MONOCYTES 0.7 0.1 - 1.3 K/UL    ABS. EOSINOPHILS 0.0 0.0 - 0.8 K/UL    ABS. BASOPHILS 0.0 0.0 - 0.2 K/UL    ABS. IMM. GRANS. 0.1 0.0 - 0.5 K/UL   METABOLIC PANEL, COMPREHENSIVE    Collection Time: 08/30/19  1:05 PM   Result Value Ref Range    Sodium 138 136 - 145 mmol/L    Potassium 3.8 3.5 - 5.1 mmol/L    Chloride 106 98 - 107 mmol/L    CO2 24 21 - 32 mmol/L    Anion gap 8 7 - 16 mmol/L    Glucose 80 65 - 100 mg/dL    BUN 8 6 - 23 MG/DL    Creatinine 0.56 (L) 0.6 - 1.0 MG/DL    GFR est AA >60 >60 ml/min/1.73m2    GFR est non-AA >60 >60 ml/min/1.73m2    Calcium 8.6 8.3 - 10.4 MG/DL    Bilirubin, total 0.2 0.2 - 1.1 MG/DL    ALT (SGPT) 18 12 - 65 U/L    AST (SGOT) 16 15 - 37 U/L    Alk.  phosphatase 98 50 - 130 U/L    Protein, total 6.5 6.3 - 8.2 g/dL    Albumin 2.8 (L) 3.5 - 5.0 g/dL    Globulin 3.7 (H) 2.3 - 3.5 g/dL    A-G Ratio 0.8 (L) 1.2 - 3.5     URIC ACID    Collection Time: 08/30/19  1:05 PM   Result Value Ref Range    Uric acid 3.1 2.6 - 6.0 MG/DL   LD    Collection Time: 08/30/19  1:05 PM   Result Value Ref Range     100 - 190 U/L       Assessment and Plan:     Principal Problem:    Elevated blood pressure affecting pregnancy in third trimester, antepartum (8/30/2019)       Admit for overnight observation with 24 hour urine protein and betamethasone    Ultrasound- vertex, daquan = 16, grade 1 placenta

## 2019-08-30 NOTE — ED TRIAGE NOTES
28 weeks gestation . Milan Aj is OB. Reports took BP at home and was 150/100. Hx of pre-eclampsia with first pregnancy. C/o headache.

## 2019-08-30 NOTE — ED PROVIDER NOTES
Chief Complaint: elevated bp and generally not feeling well      29 y.o. female  at 27w5d  weeks gestation who is seen for moderate headache  and elevated blood pressure. pt reports generally not feeling well with headache and fatigue. She reports bp at home 150/100. Good fetal movement. No edema. Good appetite. No abdominal pain. HISTORY:    Social History     Substance and Sexual Activity   Sexual Activity Not on file    Comment: Nexplanon     No LMP recorded. Patient is pregnant.     Social History     Socioeconomic History    Marital status: SINGLE     Spouse name: Not on file    Number of children: Not on file    Years of education: Not on file    Highest education level: Not on file   Occupational History    Not on file   Social Needs    Financial resource strain: Not on file    Food insecurity:     Worry: Not on file     Inability: Not on file    Transportation needs:     Medical: Not on file     Non-medical: Not on file   Tobacco Use    Smoking status: Former Smoker     Last attempt to quit: 2017     Years since quittin.6    Smokeless tobacco: Never Used   Substance and Sexual Activity    Alcohol use: Yes     Comment: 2 per night    Drug use: No    Sexual activity: Not on file     Comment: Nexplanon   Lifestyle    Physical activity:     Days per week: Not on file     Minutes per session: Not on file    Stress: Not on file   Relationships    Social connections:     Talks on phone: Not on file     Gets together: Not on file     Attends [employer] service: Not on file     Active member of club or organization: Not on file     Attends meetings of clubs or organizations: Not on file     Relationship status: Not on file    Intimate partner violence:     Fear of current or ex partner: Not on file     Emotionally abused: Not on file     Physically abused: Not on file     Forced sexual activity: Not on file   Other Topics Concern   [address] Service Not Asked    Blood Transfusions Not Asked    Caffeine Concern Not Asked    Occupational Exposure Not Asked    Hobby Hazards Not Asked    Sleep Concern Not Asked    Stress Concern Not Asked    Weight Concern Not Asked    Special Diet Not Asked    Back Care Not Asked    Exercise Not Asked    Bike Helmet Not Asked   [address] Yes    Self-Exams Not Asked   Social History Narrative            , Jer Cid)                DaughterEdi 7/2018        Denies physical or sexual abuse       Past Surgical History:   Procedure Laterality Date    BREAST SURGERY PROCEDURE UNLISTED      Breast lift    GASTRIC BYPASS,OBESE<150CM SHAYNA-EN-Y      HX GASTRIC BYPASS  2013    HX MASTOPEXY (BREAST LIFT)      HX ORTHOPAEDIC Right 2012    right leg - rods and pins    HX OTHER SURGICAL      Tummy Tuck    HX OTHER SURGICAL      Facial surgery x 2 after dog bite       Past Medical History:   Diagnosis Date    Abnormal Papanicolaou smear of cervix     LEEP 2003    Anemia     Asthma     childhood, does not use inhaler    Depression     Zoloft    Essential hypertension     Gestational hypertension     Iron deficiency anemia          ROS:  A 12 point review of symptoms negative except for chief complaint as described above. PHYSICAL EXAM:  Blood pressure 139/88, pulse 82, resp. rate 17, height 5' 6\" (1.676 m), weight 84.8 kg (187 lb), SpO2 97 %, unknown if currently breastfeeding. 167/95   Constitutional: The patient appears well, alert, oriented x 3. Cardiovascular: Heart RRR, no murmurs.    Respiratory: Lungs clear, no respiratory distress  GI: Abdomen soft, nontender, no guarding  No fundal tenderness  Musculoskeletal: no cva tenderness  Upper ext: no edema, reflexes +2  Lower ext: no edema, neg lindsay's, reflexes +2  Skin: no rashes or lesions  Psychiatric:Mood/ Affect: appropriate  Genitourinary: SVE:cl/th  FHT:appropriate gest age  TOCO:no contractions  Brief bedside ultrasound- vertex, ant placenta grade 1, daquan = 17, lots of movement noted    I personally reviewed pt's medical record including relevant labs and ultrasounds  I reviewed the NST at today's encounter    Recent Results (from the past 12 hour(s))   POC URINE DIPSTICK MANUAL    Collection Time: 08/30/19 12:38 PM   Result Value Ref Range    Protein (POC) 30 mg/dL Negative    Glucose, urine (POC) Negative Negative    Ketones (POC) 15 mg/dL Negative   URINALYSIS W/ RFLX MICROSCOPIC    Collection Time: 08/30/19 12:54 PM   Result Value Ref Range    Color YELLOW      Appearance CLEAR      Specific gravity 1.019 1.001 - 1.023      pH (UA) 7.5 5.0 - 9.0      Protein NEGATIVE  NEG mg/dL    Glucose NEGATIVE  mg/dL    Ketone TRACE (A) NEG mg/dL    Bilirubin NEGATIVE  NEG      Blood NEGATIVE  NEG      Urobilinogen 1.0 0.2 - 1.0 EU/dL    Nitrites NEGATIVE  NEG      Leukocyte Esterase SMALL (A) NEG      WBC 0-3 0 /hpf    RBC 0-3 0 /hpf    Epithelial cells 5-10 0 /hpf    Bacteria TRACE 0 /hpf    Casts 0-3 0 /lpf   PROTEIN/CREATININE RATIO, URINE    Collection Time: 08/30/19 12:54 PM   Result Value Ref Range    Protein, urine random 24 mg/dL    Creatinine, urine 114.00 mg/dL    Protein/Creat. urine Ratio 0.2     CBC WITH AUTOMATED DIFF    Collection Time: 08/30/19  1:05 PM   Result Value Ref Range    WBC 13.2 (H) 4.3 - 11.1 K/uL    RBC 4.07 4.05 - 5.2 M/uL    HGB 12.3 11.7 - 15.4 g/dL    HCT 35.9 35.8 - 46.3 %    MCV 88.2 79.6 - 97.8 FL    MCH 30.2 26.1 - 32.9 PG    MCHC 34.3 31.4 - 35.0 g/dL    RDW 12.9 11.9 - 14.6 %    PLATELET 876 210 - 920 K/uL    MPV 9.6 9.4 - 12.3 FL    ABSOLUTE NRBC 0.00 0.0 - 0.2 K/uL    DF AUTOMATED      NEUTROPHILS 77 43 - 78 %    LYMPHOCYTES 16 13 - 44 %    MONOCYTES 6 4.0 - 12.0 %    EOSINOPHILS 0 (L) 0.5 - 7.8 %    BASOPHILS 0 0.0 - 2.0 %    IMMATURE GRANULOCYTES 1 0.0 - 5.0 %    ABS. NEUTROPHILS 10.2 (H) 1.7 - 8.2 K/UL    ABS. LYMPHOCYTES 2.2 0.5 - 4.6 K/UL    ABS. MONOCYTES 0.7 0.1 - 1.3 K/UL    ABS. EOSINOPHILS 0.0 0.0 - 0.8 K/UL    ABS.  BASOPHILS 0.0 0.0 - 0.2 K/UL    ABS. IMM. GRANS. 0.1 0.0 - 0.5 K/UL   METABOLIC PANEL, COMPREHENSIVE    Collection Time: 19  1:05 PM   Result Value Ref Range    Sodium 138 136 - 145 mmol/L    Potassium 3.8 3.5 - 5.1 mmol/L    Chloride 106 98 - 107 mmol/L    CO2 24 21 - 32 mmol/L    Anion gap 8 7 - 16 mmol/L    Glucose 80 65 - 100 mg/dL    BUN 8 6 - 23 MG/DL    Creatinine 0.56 (L) 0.6 - 1.0 MG/DL    GFR est AA >60 >60 ml/min/1.73m2    GFR est non-AA >60 >60 ml/min/1.73m2    Calcium 8.6 8.3 - 10.4 MG/DL    Bilirubin, total 0.2 0.2 - 1.1 MG/DL    ALT (SGPT) 18 12 - 65 U/L    AST (SGOT) 16 15 - 37 U/L    Alk.  phosphatase 98 50 - 130 U/L    Protein, total 6.5 6.3 - 8.2 g/dL    Albumin 2.8 (L) 3.5 - 5.0 g/dL    Globulin 3.7 (H) 2.3 - 3.5 g/dL    A-G Ratio 0.8 (L) 1.2 - 3.5     URIC ACID    Collection Time: 19  1:05 PM   Result Value Ref Range    Uric acid 3.1 2.6 - 6.0 MG/DL   LD    Collection Time: 19  1:05 PM   Result Value Ref Range     100 - 190 U/L     Assessment/Plan:  30 yo  at 27w5d with headache and elevated bp  Labs wnl  Admit for 24 hour urine, observation, steroids

## 2019-08-31 ENCOUNTER — APPOINTMENT (OUTPATIENT)
Dept: MRI IMAGING | Age: 34
DRG: 833 | End: 2019-08-31
Attending: OBSTETRICS & GYNECOLOGY
Payer: COMMERCIAL

## 2019-08-31 VITALS
HEIGHT: 66 IN | WEIGHT: 193 LBS | DIASTOLIC BLOOD PRESSURE: 65 MMHG | RESPIRATION RATE: 18 BRPM | BODY MASS INDEX: 31.02 KG/M2 | SYSTOLIC BLOOD PRESSURE: 135 MMHG | OXYGEN SATURATION: 97 % | HEART RATE: 81 BPM | TEMPERATURE: 97.9 F

## 2019-08-31 PROBLEM — O16.2 ELEVATED BLOOD PRESSURE AFFECTING PREGNANCY IN SECOND TRIMESTER, ANTEPARTUM: Status: ACTIVE | Noted: 2019-08-30

## 2019-08-31 PROBLEM — O14.12 PREECLAMPSIA, SEVERE, SECOND TRIMESTER: Status: ACTIVE | Noted: 2019-08-30

## 2019-08-31 LAB
COLLECT DURATION TIME UR: 24 HR
COLLECT DURATION TIME UR: 24 HR
CREAT 24H CL BSA ADJ PNL UR+SERPL: 158 ML/MIN
CREAT 24H UR-MRATE: 1431 MG/24HR (ref 600–1800)
CREAT UR-MCNC: 53 MG/DL
PROT 24H UR-MRATE: 270 MG/24HR
PROT UR-MCNC: 10 MG/DL
SPECIMEN VOL ?TM UR: 2700 ML
SPECIMEN VOL ?TM UR: 2700 ML

## 2019-08-31 PROCEDURE — 74011250637 HC RX REV CODE- 250/637: Performed by: OBSTETRICS & GYNECOLOGY

## 2019-08-31 PROCEDURE — 74011250636 HC RX REV CODE- 250/636: Performed by: OBSTETRICS & GYNECOLOGY

## 2019-08-31 PROCEDURE — 70544 MR ANGIOGRAPHY HEAD W/O DYE: CPT

## 2019-08-31 PROCEDURE — 99218 HC RM OBSERVATION: CPT

## 2019-08-31 PROCEDURE — 65270000029 HC RM PRIVATE

## 2019-08-31 RX ORDER — OXYCODONE HCL 5 MG/5 ML
5 SOLUTION, ORAL ORAL ONCE
Status: COMPLETED | OUTPATIENT
Start: 2019-08-31 | End: 2019-08-31

## 2019-08-31 RX ORDER — MAGNESIUM SULFATE HEPTAHYDRATE 40 MG/ML
4 INJECTION, SOLUTION INTRAVENOUS ONCE
Status: DISCONTINUED | OUTPATIENT
Start: 2019-08-31 | End: 2019-08-31 | Stop reason: SDUPTHER

## 2019-08-31 RX ORDER — METOCLOPRAMIDE 10 MG/1
10 TABLET ORAL
Status: DISCONTINUED | OUTPATIENT
Start: 2019-08-31 | End: 2019-09-01 | Stop reason: HOSPADM

## 2019-08-31 RX ORDER — CALCIUM GLUCONATE 94 MG/ML
1 INJECTION, SOLUTION INTRAVENOUS AS NEEDED
Status: DISCONTINUED | OUTPATIENT
Start: 2019-08-31 | End: 2019-09-01 | Stop reason: HOSPADM

## 2019-08-31 RX ORDER — MAGNESIUM SULFATE HEPTAHYDRATE 40 MG/ML
4 INJECTION, SOLUTION INTRAVENOUS ONCE
Status: COMPLETED | OUTPATIENT
Start: 2019-08-31 | End: 2019-08-31

## 2019-08-31 RX ORDER — SODIUM CHLORIDE 0.9 % (FLUSH) 0.9 %
5-40 SYRINGE (ML) INJECTION EVERY 8 HOURS
Status: DISCONTINUED | OUTPATIENT
Start: 2019-08-31 | End: 2019-09-01 | Stop reason: HOSPADM

## 2019-08-31 RX ORDER — MAGNESIUM SULFATE HEPTAHYDRATE 40 MG/ML
2 INJECTION, SOLUTION INTRAVENOUS CONTINUOUS
Status: DISCONTINUED | OUTPATIENT
Start: 2019-08-31 | End: 2019-09-01 | Stop reason: HOSPADM

## 2019-08-31 RX ORDER — SODIUM CHLORIDE 0.9 % (FLUSH) 0.9 %
5-40 SYRINGE (ML) INJECTION AS NEEDED
Status: DISCONTINUED | OUTPATIENT
Start: 2019-08-31 | End: 2019-09-01 | Stop reason: HOSPADM

## 2019-08-31 RX ORDER — SODIUM CHLORIDE, SODIUM LACTATE, POTASSIUM CHLORIDE, CALCIUM CHLORIDE 600; 310; 30; 20 MG/100ML; MG/100ML; MG/100ML; MG/100ML
75-125 INJECTION, SOLUTION INTRAVENOUS
Status: DISCONTINUED | OUTPATIENT
Start: 2019-08-31 | End: 2019-09-01 | Stop reason: HOSPADM

## 2019-08-31 RX ORDER — DIPHENHYDRAMINE HCL 25 MG
25 CAPSULE ORAL
Status: DISCONTINUED | OUTPATIENT
Start: 2019-08-31 | End: 2019-09-01 | Stop reason: HOSPADM

## 2019-08-31 RX ORDER — MAGNESIUM SULFATE HEPTAHYDRATE 40 MG/ML
1 INJECTION, SOLUTION INTRAVENOUS CONTINUOUS
Status: DISCONTINUED | OUTPATIENT
Start: 2019-08-31 | End: 2019-08-31 | Stop reason: SDUPTHER

## 2019-08-31 RX ADMIN — DIPHENHYDRAMINE HYDROCHLORIDE 25 MG: 25 CAPSULE ORAL at 10:52

## 2019-08-31 RX ADMIN — METOCLOPRAMIDE 10 MG: 10 TABLET ORAL at 19:07

## 2019-08-31 RX ADMIN — MAGNESIUM SULFATE HEPTAHYDRATE 2 G/HR: 40 INJECTION, SOLUTION INTRAVENOUS at 15:34

## 2019-08-31 RX ADMIN — MAGNESIUM SULFATE HEPTAHYDRATE 4 G: 40 INJECTION, SOLUTION INTRAVENOUS at 13:42

## 2019-08-31 RX ADMIN — ACETAMINOPHEN 650 MG: 325 SOLUTION ORAL at 19:06

## 2019-08-31 RX ADMIN — METOCLOPRAMIDE 10 MG: 10 TABLET ORAL at 10:52

## 2019-08-31 RX ADMIN — BETAMETHASONE SODIUM PHOSPHATE AND BETAMETHASONE ACETATE 12 MG: 3; 3 INJECTION, SUSPENSION INTRA-ARTICULAR; INTRALESIONAL; INTRAMUSCULAR at 16:04

## 2019-08-31 RX ADMIN — OXYCODONE HYDROCHLORIDE 5 MG: 5 SOLUTION ORAL at 13:18

## 2019-08-31 RX ADMIN — DIPHENHYDRAMINE HYDROCHLORIDE 25 MG: 25 CAPSULE ORAL at 19:07

## 2019-08-31 RX ADMIN — BUTALBITAL, ACETAMINOPHEN AND CAFFEINE 2 TABLET: 50; 325; 40 TABLET ORAL at 09:37

## 2019-08-31 RX ADMIN — SODIUM CHLORIDE, SODIUM LACTATE, POTASSIUM CHLORIDE, AND CALCIUM CHLORIDE 75 ML/HR: 600; 310; 30; 20 INJECTION, SOLUTION INTRAVENOUS at 15:24

## 2019-08-31 NOTE — PROGRESS NOTES
Pt has been tolerating magnesium sulfate. Respiratory rate is wnl, LCTA, O2 sat . Reflexes wnl. Headache has decreased to a pain level of 1 at this time.

## 2019-08-31 NOTE — H&P
OB History & Physical    Name: Leigha Gillis MRN: [id number]  SSN: [id number]    YOB: 1985  Age: 29 y.o. Sex: female      Subjective:     Reason for Admission:  Pregnancy at 27w5d and new elevated blood pressures/headache    History of Present Illness: Ms. Fany Maynard is a 29 y.o.  female with an estimated gestational age of 33w11d with Estimated Date of Delivery: 19. Patient complains of elevated blood pressure and headache for 1 days. Pregnancy has been complicated by history of preeclampsia in a pervious pregnancy, with delivery at term (37w). Patient denies abdominal pain  , chest pain, contractions, nausea and vomiting, pelvic pressure, shortness of breath, swelling, vaginal bleeding , vaginal leaking of fluid  and visual disturbances.     OB History    Para Term  AB Living   4 1 1   1 1   SAB TAB Ectopic Molar Multiple Live Births   1       0 1      # Outcome Date GA Lbr Jesse/2nd Weight Sex Delivery Anes PTL Lv   4 Current            3 Term 18 37w5d / 00:41 2.75 kg F Vag-Spont EPIDURAL AN N REAL   2             1 SAB              Past Medical History:   Diagnosis Date    Abnormal Papanicolaou smear of cervix     LEEP 2003    Anemia     Asthma     childhood, does not use inhaler    Depression     Zoloft    Essential hypertension     Gestational hypertension     Iron deficiency anemia      Past Surgical History:   Procedure Laterality Date    BREAST SURGERY PROCEDURE UNLISTED      Breast lift    GASTRIC BYPASS,OBESE<150CM SHAYNA-EN-Y      HX GASTRIC BYPASS  2013    HX MASTOPEXY (BREAST LIFT)      HX ORTHOPAEDIC Right 2012    right leg - rods and pins    HX OTHER SURGICAL      Tummy Tuck    HX OTHER SURGICAL      Facial surgery x 2 after dog bite     Social History     Occupational History    Not on file   Tobacco Use    Smoking status: Former Smoker     Last attempt to quit: 2017     Years since quittin.6    Smokeless tobacco: Never Used Substance and Sexual Activity    Alcohol use: Yes     Comment: 2 per night    Drug use: No    Sexual activity: Not on file     Comment: Nexplanon      Family History   Problem Relation Age of Onset    Anxiety Mother     Anxiety Father     Hypertension Father     Other Other         depression, CAD, anxiety, ADD, bipolar       No Known Allergies  Prior to Admission medications    Medication Sig Start Date End Date Taking? Authorizing Provider   sertraline (ZOLOFT) 50 mg tablet TAKE 1/2 TABLET FOR 6 DAYS THEN INCREASE TO 1 TABLET IF NEEDED 19  Yes Provider, Historical   PNV66-Iron Fumarate-FA-DSS-DHA 26-1.2- mg cap Take  by mouth. Yes Provider, Historical   diphenoxylate-atropine (LOMOTIL) 2.5-0.025 mg per tablet 1 to 2 qid prn diarrhea 19   Davon Capone MD   ondansetron (ZOFRAN ODT) 8 mg disintegrating tablet Take 1 Tab by mouth every eight (8) hours as needed for Nausea. 19   Davon Capone MD   dextroamphetamine-amphetamine (ADDERALL) 30 mg tablet Earliest Fill Date: 19.  1 bid for ADD 19   Davon Capone MD   dextroamphetamine-amphetamine (ADDERALL) 30 mg tablet Earliest Fill Date: 19.  1 bid for ADD   Fill after 30 days 19   Davon Capone MD   dextroamphetamine-amphetamine (ADDERALL) 30 mg tablet Earliest Fill Date: 3/18/19.  1 bid for ADD   Fill after 60 days 3/18/19   Davon Capone MD        Review of Systems:  A comprehensive review of systems was negative except for that written in the History of Present Illness.      Objective:     Vitals:    Vitals:    19 1928 19 2050 19 0146 19 0600   BP: (!) 149/95 134/78 141/88 131/75   Pulse: 82 71 80 88   Resp:       Temp:  97.8 °F (36.6 °C)  97.9 °F (36.6 °C)   SpO2:       Weight:       Height:          Temp (24hrs), Av.9 °F (36.6 °C), Min:97.8 °F (36.6 °C), Max:97.9 °F (36.6 °C)    BP  Min: 131/75  Max: 167/95     Physical Exam:  Patient without distress. Heart: Regular rate and rhythm  Lung: clear to auscultation throughout lung fields, no wheezes, no rales, no rhonchi and normal respiratory effort  Back: costovertebral angle tenderness absent  Abdomen: soft, nontender  Fundus: soft and non tender  Lower Extremities:  - Edema No, DTRs 2+ BL, no clonus    Fetal Heart Rate:  Reactive       Lab/Data Review:  Recent Results (from the past 24 hour(s))   POC URINE DIPSTICK MANUAL    Collection Time: 08/30/19 12:38 PM   Result Value Ref Range    Protein (POC) 30 mg/dL Negative    Glucose, urine (POC) Negative Negative    Ketones (POC) 15 mg/dL Negative   URINALYSIS W/ RFLX MICROSCOPIC    Collection Time: 08/30/19 12:54 PM   Result Value Ref Range    Color YELLOW      Appearance CLEAR      Specific gravity 1.019 1.001 - 1.023      pH (UA) 7.5 5.0 - 9.0      Protein NEGATIVE  NEG mg/dL    Glucose NEGATIVE  mg/dL    Ketone TRACE (A) NEG mg/dL    Bilirubin NEGATIVE  NEG      Blood NEGATIVE  NEG      Urobilinogen 1.0 0.2 - 1.0 EU/dL    Nitrites NEGATIVE  NEG      Leukocyte Esterase SMALL (A) NEG      WBC 0-3 0 /hpf    RBC 0-3 0 /hpf    Epithelial cells 5-10 0 /hpf    Bacteria TRACE 0 /hpf    Casts 0-3 0 /lpf   PROTEIN/CREATININE RATIO, URINE    Collection Time: 08/30/19 12:54 PM   Result Value Ref Range    Protein, urine random 24 mg/dL    Creatinine, urine 114.00 mg/dL    Protein/Creat.  urine Ratio 0.2     CBC WITH AUTOMATED DIFF    Collection Time: 08/30/19  1:05 PM   Result Value Ref Range    WBC 13.2 (H) 4.3 - 11.1 K/uL    RBC 4.07 4.05 - 5.2 M/uL    HGB 12.3 11.7 - 15.4 g/dL    HCT 35.9 35.8 - 46.3 %    MCV 88.2 79.6 - 97.8 FL    MCH 30.2 26.1 - 32.9 PG    MCHC 34.3 31.4 - 35.0 g/dL    RDW 12.9 11.9 - 14.6 %    PLATELET 789 688 - 982 K/uL    MPV 9.6 9.4 - 12.3 FL    ABSOLUTE NRBC 0.00 0.0 - 0.2 K/uL    DF AUTOMATED      NEUTROPHILS 77 43 - 78 %    LYMPHOCYTES 16 13 - 44 %    MONOCYTES 6 4.0 - 12.0 %    EOSINOPHILS 0 (L) 0.5 - 7.8 %    BASOPHILS 0 0.0 - 2.0 % IMMATURE GRANULOCYTES 1 0.0 - 5.0 %    ABS. NEUTROPHILS 10.2 (H) 1.7 - 8.2 K/UL    ABS. LYMPHOCYTES 2.2 0.5 - 4.6 K/UL    ABS. MONOCYTES 0.7 0.1 - 1.3 K/UL    ABS. EOSINOPHILS 0.0 0.0 - 0.8 K/UL    ABS. BASOPHILS 0.0 0.0 - 0.2 K/UL    ABS. IMM. GRANS. 0.1 0.0 - 0.5 K/UL   METABOLIC PANEL, COMPREHENSIVE    Collection Time: 08/30/19  1:05 PM   Result Value Ref Range    Sodium 138 136 - 145 mmol/L    Potassium 3.8 3.5 - 5.1 mmol/L    Chloride 106 98 - 107 mmol/L    CO2 24 21 - 32 mmol/L    Anion gap 8 7 - 16 mmol/L    Glucose 80 65 - 100 mg/dL    BUN 8 6 - 23 MG/DL    Creatinine 0.56 (L) 0.6 - 1.0 MG/DL    GFR est AA >60 >60 ml/min/1.73m2    GFR est non-AA >60 >60 ml/min/1.73m2    Calcium 8.6 8.3 - 10.4 MG/DL    Bilirubin, total 0.2 0.2 - 1.1 MG/DL    ALT (SGPT) 18 12 - 65 U/L    AST (SGOT) 16 15 - 37 U/L    Alk. phosphatase 98 50 - 130 U/L    Protein, total 6.5 6.3 - 8.2 g/dL    Albumin 2.8 (L) 3.5 - 5.0 g/dL    Globulin 3.7 (H) 2.3 - 3.5 g/dL    A-G Ratio 0.8 (L) 1.2 - 3.5     URIC ACID    Collection Time: 08/30/19  1:05 PM   Result Value Ref Range    Uric acid 3.1 2.6 - 6.0 MG/DL   LD    Collection Time: 08/30/19  1:05 PM   Result Value Ref Range     100 - 190 U/L       Assessment and Plan:     Principal Problem:    Elevated blood pressure affecting pregnancy in third trimester, antepartum (8/30/2019)       Patient admitted for serial blood pressure monitoring and 24hr urine collection. She has had a single severe range blood pressure and continues to report a HA. Will treat HA, but if HA does not resolve or if further severe range Bps develop, plan to initiate magnesium for seizure prophylaxis. BMZ given for FLM. FHT reactive and reassuring.      Dai Walters MD

## 2019-08-31 NOTE — PROGRESS NOTES
Pt states +FM. Pt denies visual disturbances, vag bleeding, vag discharge, and epigastric pain. Pt states HA is now a 3 after receiving fioricet.

## 2019-08-31 NOTE — PROGRESS NOTES
Pt called out to report that after receiving her ordered fiorecet, reglan, and benadryl the patient still has a headache that is 6 on pain scale. Dr. Shannen Porter called and report was given that pt's has mildly elevated BP's 140/90's. No upper right quadrant pain, and reflexes are 2+. Pt's LCTA. Orders received at this time. See MAR.

## 2019-08-31 NOTE — PHYSICIAN ADVISORY
Letter of Status Determination:   Recommend hospitalization status upgraded from   OBSERVATION  to INPATIENT  Status     Pt Name:  Owen Beyer   MR#   72 [facility] # [id number] /  [id number]  Payor: [employer]amar [employer] / [facility] / Product Type: HMO /    CSN#  [id number]   Room and Hospital  438/01  @ [address] [place] [address]   Hospitalization date  8/30/2019 12:17 PM   Current Attending Physician  Ivan Valera MD   Principal diagnosis  Elevated blood pressure affecting pregnancy in third trimester, antepartum [O16.3]  Elevated blood pressure affecting pregnancy in third trimester, antepartum [O16.3]     Clinicals  29 y.o. y.o  female hospitalized with above diagnosis   Patient admitted for serial blood pressure monitoring and 24hr urine collection. She has had a single severe range blood pressure and continues to report a HA.  Will treat HA, but if HA does not resolve or if further severe range Bps develop, plan to initiate magnesium for seizure prophylaxis     [place] (MCG) criteria   Does   apply Cerebral or visual symptoms (eg, headache, Altered mental status, changes in vision)     STATUS DETERMINATION  Inpatient        Additional comments     Payor: [employer]amar West / Plan: 100 [employer]ADOW HMO / Product Type: HMELAINE /         Svetlana Carolina MD

## 2019-08-31 NOTE — PROGRESS NOTES
Antepartum Progress Note:     S: Ms. Christian Berry is admitted with pregnancy at 27w6d for new diagnosis of GHTN and a severe persistent headache concerning for Preeclampsia with severe features based on HA. 24hr urine resulted: 270mg Tprt  MRA/V: Normal  Blood pressures continue to be mild range. Headache reduced to 1/10 after liquid oxycodone and initiation of IV magnesium. O:   Patient Vitals for the past 12 hrs:   Temp Pulse BP SpO2   08/31/19 1539    100 %   08/31/19 1446    97 %   08/31/19 1444  80 139/84    08/31/19 1439  85 (!) 150/97    08/31/19 1434  90 (!) 144/92    08/31/19 1429  86 142/87    08/31/19 1424  82 143/86    08/31/19 1419  86 142/85    08/31/19 1414  83 141/83    08/31/19 1409  97 144/85    08/31/19 1408    96 %   08/31/19 1404  91 138/81    08/31/19 1403    96 %   08/31/19 1359  100 145/85    08/31/19 1354  (!) 101 140/79    08/31/19 1349  98 148/83    08/31/19 1344  88 138/87    08/31/19 0939  86 (!) 140/92    08/31/19 0831  82 141/87    08/31/19 0600 97.9 °F (36.6 °C) 88 131/75       Fetal Heart Rate: Baseline: 135 per minute  Variability: moderate  Accelerations: yes  Decelerations: variable       Reassuring FHT, normal for GA     A/P:    GHTN, concern for progression to PreE with severe features based on persistent HA.  - 24h urine above baseline, but does not rule in for PreE  - MRA/V normal. No evidence of thrombosis. - Given improvement in [place] on Mag, will plan to continue for the next 24h  - If severe headache returns or severe blood pressures develop, will plan to treat like PreE with severe features and transfer to [facility] for higher level NICU care. - Patient discussed with KATHY, Dr. Phillip Fleming, who agrees to this plan. She will see patient tomorrow. FWB  - S/p BMZ x2 (8/30 and 8/31 at 12p)  - CEFM - Reactive and reassuring for gestational age.     Kimberly Blackwell MD

## 2019-08-31 NOTE — PROGRESS NOTES
Pt has her 4 gm bolus of Magnesium almost infused. Radiology called and will need a MRI checklist completed. Once the bolus has infused we will take pt for MRI.

## 2019-08-31 NOTE — PROGRESS NOTES
Pt has called out to report that her headache has returned a 7/10 on the pain scale. Meds given see MAR at this time.

## 2019-08-31 NOTE — PROGRESS NOTES
Called Dr Km Mark, informed md of pt complaining of headache rating it 7-8/10, md states to go head with transfer to [facility].  MD given numbers to call to initiate transfer, md will call back with new adimtitting md.

## 2019-08-31 NOTE — PROGRESS NOTES
Dr. Les Ash called unit and she has reviewed labs and MRI. She will consult with M. Report given that pt has tolerated the magnesium well and states headache is down to a pain level of 1.

## 2019-08-31 NOTE — PROGRESS NOTES
Report given to MARIA ELENA Pacheco RN by [employer] and care relinquished.

## 2019-09-01 PROBLEM — O14.13 PREECLAMPSIA, SEVERE, THIRD TRIMESTER: Status: ACTIVE | Noted: 2019-08-30

## 2019-09-01 NOTE — DISCHARGE SUMMARY
Antepartum Discharge Summary     Name: Harriette Goodell MRN: [id number]  SSN: [id number]    YOB: 1985  Age: 29 y.o. Sex: female      Allergies: Patient has no known allergies. Admit Date: 2019    Discharge Date: 2019     Admitting Physician: Matty Peterson MD     Attending Physician:  Eliza Del Rio MD     * Admission Diagnoses: Elevated blood pressure affecting pregnancy in second trimester, antepartum [O16.2]      * Discharge Diagnoses:   Hospital Problems as of 2019 Date Reviewed: 2019          Codes Class Noted - Resolved POA    * (Principal) Preeclampsia, severe, third trimester ICD-10-CM: O14.13  ICD-9-CM: 642.53  2019 - Present Yes             Lab Results   Component Value Date/Time    ABO/Rh(D) O POSITIVE 2019 03:28 PM    GrBStrep, External positive 2018 05:02 PM   PreE labs : Hgb 12.3, Plt 287, Cr. 0.56, AT/ALT 16/18, Uric acid 3.1,   24hr urine : Vol 2700mL, Tprt 270mg; CrCl 158 mL/min    Immunization History   Administered Date(s) Administered    MMR 2018       * Discharge Condition: fair, stable for transport to [facility] for further management. * Procedures:   - Betamethasone given  and  at 12p  - CEFM: Reactive and reassuring for gestational age  - IV Mag 4/2 initiated     *Imaging: MRA and MRV : within normal limits      [facility] Course:    Ms. Wynn is a 33 y.o.  female was admitted at 27w5d dated by 6 wk U/S. She was admitted due to new onset persistent elevated blood pressures and headache. Preeclampsia labs unremarkable as noted above. Serial blood pressures were performed along with 24hr urine collection and BMZ was given ( and  at 12p) for FLM. Pregnancy has been complicated by mildly elevated BP of 132/92 at 22 weeks followed by normal 24hr urine and blood work. This 24hr urine resulted with 96mg Tprt. She was normotensives following this visit until now. She has a normal BiBp of 112/68 at 6w4d gestation. History also significant for gastric bypass surgery, anxiety/depression, and short interval pregnancy. Obstetric history significant for preeclampsia in a pervious pregnancy, with delivery via  at term (37w).    Through the course of her admission, she had a single severe range pressure, all else mildly elevated. She received treatment for her headache with tylenol, fioricet, Benadryl/Regnal, oxycodone. She had temporary improvements in her headache, but no resolution. She rates headache 7-8/10 most consistently. She does sometimes get headaches outside of pregnancy, but never this bad. She also noted during the admission this headache feels like her preeclamptic headache in her previous pregnancy. Magnesium 4/2 was initiated  due to persistence of headache. MRA/MRV performed to evaluate severe HA  was normal. 24hr urine collection resulted 270mg Tprt. Patient discussed with HODAN Doherty at [address] [place] [address], who recommended treating like severe preeclamptic given persistent headache. And, given early gestation, she would benefit from transfer to a facility with a higher level NICU. * Disposition: Transfer to [facility], transfer accepted by Dr. Carolina Fox. Discharge Medications:   Current Discharge Medication List      CONTINUE these medications which have NOT CHANGED    Details   sertraline (ZOLOFT) 50 mg tablet TAKE 1/2 TABLET FOR 6 DAYS THEN INCREASE TO 1 TABLET IF NEEDED  Refills: 3      PNV66-Iron Fumarate-FA-DSS-DHA 26-1.2- mg cap Take  by mouth. ondansetron (ZOFRAN ODT) 8 mg disintegrating tablet Take 1 Tab by mouth every eight (8) hours as needed for Nausea.   Qty: 18 Tab, Refills: 5    Associated Diagnoses: Gastroenteritis         STOP taking these medications       diphenoxylate-atropine (LOMOTIL) 2.5-0.025 mg per tablet Comments:   Reason for Stopping:         dextroamphetamine-amphetamine (ADDERALL) 30 mg tablet Comments:   Reason for Stopping:         dextroamphetamine-amphetamine (ADDERALL) 30 mg tablet Comments:   Reason for Stopping:         dextroamphetamine-amphetamine (ADDERALL) 30 mg tablet Comments:   Reason for Stopping:               * Follow-up Care/Patient Instructions:   Activity: Bedrest  Diet: Regular Diet  Wound Care: None needed    Follow-up Information    None

## 2019-09-09 PROBLEM — D50.9 IRON DEFICIENCY ANEMIA: Status: RESOLVED | Noted: 2018-06-05 | Resolved: 2019-09-09

## 2019-09-09 PROBLEM — Z3A.33 33 WEEKS GESTATION OF PREGNANCY: Status: RESOLVED | Noted: 2018-06-05 | Resolved: 2019-09-09

## 2019-09-10 PROBLEM — O13.9 GESTATIONAL HYPERTENSION: Status: ACTIVE | Noted: 2019-09-10

## 2019-09-10 PROBLEM — O13.3 GESTATIONAL HYPERTENSION, THIRD TRIMESTER: Status: ACTIVE | Noted: 2019-09-10

## 2019-09-10 PROBLEM — O99.843: Status: ACTIVE | Noted: 2018-06-05

## 2019-10-11 ENCOUNTER — HOSPITAL ENCOUNTER (INPATIENT)
Age: 34
LOS: 4 days | Discharge: HOME | End: 2019-10-16
Attending: OBSTETRICS & GYNECOLOGY | Admitting: OBSTETRICS & GYNECOLOGY
Payer: COMMERCIAL

## 2019-10-11 DIAGNOSIS — B95.1 POSITIVE GBS TEST: ICD-10-CM

## 2019-10-11 DIAGNOSIS — O10.019 PRE-EXISTING ESSENTIAL HYPERTENSION DURING PREGNANCY, ANTEPARTUM: ICD-10-CM

## 2019-10-11 DIAGNOSIS — O99.843: ICD-10-CM

## 2019-10-11 PROBLEM — O16.9 HYPERTENSION IN PREGNANCY, ANTEPARTUM: Status: ACTIVE | Noted: 2019-10-11

## 2019-10-11 LAB
ABO + RH BLD: NORMAL
ALBUMIN SERPL-MCNC: 2.6 G/DL (ref 3.5–5)
ALBUMIN/GLOB SERPL: 0.7 {RATIO} (ref 1.2–3.5)
ALP SERPL-CCNC: 146 U/L (ref 50–136)
ALT SERPL-CCNC: 21 U/L (ref 12–65)
ANION GAP SERPL CALC-SCNC: 6 MMOL/L (ref 7–16)
AST SERPL-CCNC: 18 U/L (ref 15–37)
BILIRUB DIRECT SERPL-MCNC: 0.1 MG/DL
BILIRUB SERPL-MCNC: 0.2 MG/DL (ref 0.2–1.1)
BLOOD GROUP ANTIBODIES SERPL: NORMAL
BUN SERPL-MCNC: 11 MG/DL (ref 6–23)
CALCIUM SERPL-MCNC: 8.3 MG/DL (ref 8.3–10.4)
CHLORIDE SERPL-SCNC: 105 MMOL/L (ref 98–107)
CO2 SERPL-SCNC: 23 MMOL/L (ref 21–32)
CREAT SERPL-MCNC: 0.65 MG/DL (ref 0.6–1)
ERYTHROCYTE [DISTWIDTH] IN BLOOD BY AUTOMATED COUNT: 13.3 % (ref 11.9–14.6)
GLOBULIN SER CALC-MCNC: 3.7 G/DL (ref 2.3–3.5)
GLUCOSE SERPL-MCNC: 90 MG/DL (ref 65–100)
HCT VFR BLD AUTO: 34 % (ref 35.8–46.3)
HGB BLD-MCNC: 11.1 G/DL (ref 11.7–15.4)
LDH SERPL L TO P-CCNC: 175 U/L (ref 100–190)
MCH RBC QN AUTO: 28 PG (ref 26.1–32.9)
MCHC RBC AUTO-ENTMCNC: 32.6 G/DL (ref 31.4–35)
MCV RBC AUTO: 85.6 FL (ref 79.6–97.8)
NRBC # BLD: 0 K/UL (ref 0–0.2)
PLATELET # BLD AUTO: 280 K/UL (ref 150–450)
PMV BLD AUTO: 10.3 FL (ref 9.4–12.3)
POTASSIUM SERPL-SCNC: 4 MMOL/L (ref 3.5–5.1)
PROT SERPL-MCNC: 6.3 G/DL (ref 6.3–8.2)
RBC # BLD AUTO: 3.97 M/UL (ref 4.05–5.2)
SODIUM SERPL-SCNC: 134 MMOL/L (ref 136–145)
SPECIMEN EXP DATE BLD: NORMAL
URATE SERPL-MCNC: 4.1 MG/DL (ref 2.6–6)
WBC # BLD AUTO: 12.7 K/UL (ref 4.3–11.1)

## 2019-10-11 PROCEDURE — 84550 ASSAY OF BLOOD/URIC ACID: CPT

## 2019-10-11 PROCEDURE — 99218 HC RM OBSERVATION: CPT

## 2019-10-11 PROCEDURE — 59025 FETAL NON-STRESS TEST: CPT | Performed by: OBSTETRICS & GYNECOLOGY

## 2019-10-11 PROCEDURE — 80053 COMPREHEN METABOLIC PANEL: CPT

## 2019-10-11 PROCEDURE — 96374 THER/PROPH/DIAG INJ IV PUSH: CPT

## 2019-10-11 PROCEDURE — 36415 COLL VENOUS BLD VENIPUNCTURE: CPT

## 2019-10-11 PROCEDURE — 74011250637 HC RX REV CODE- 250/637: Performed by: OBSTETRICS & GYNECOLOGY

## 2019-10-11 PROCEDURE — 84156 ASSAY OF PROTEIN URINE: CPT

## 2019-10-11 PROCEDURE — 99223 1ST HOSP IP/OBS HIGH 75: CPT | Performed by: OBSTETRICS & GYNECOLOGY

## 2019-10-11 PROCEDURE — 74011250636 HC RX REV CODE- 250/636: Performed by: OBSTETRICS & GYNECOLOGY

## 2019-10-11 PROCEDURE — 85027 COMPLETE CBC AUTOMATED: CPT

## 2019-10-11 PROCEDURE — 96375 TX/PRO/DX INJ NEW DRUG ADDON: CPT

## 2019-10-11 PROCEDURE — 74011000250 HC RX REV CODE- 250: Performed by: OBSTETRICS & GYNECOLOGY

## 2019-10-11 PROCEDURE — 82570 ASSAY OF URINE CREATININE: CPT

## 2019-10-11 PROCEDURE — 86900 BLOOD TYPING SEROLOGIC ABO: CPT

## 2019-10-11 PROCEDURE — 96376 TX/PRO/DX INJ SAME DRUG ADON: CPT

## 2019-10-11 PROCEDURE — 83615 LACTATE (LD) (LDH) ENZYME: CPT

## 2019-10-11 PROCEDURE — 82248 BILIRUBIN DIRECT: CPT

## 2019-10-11 RX ORDER — SODIUM CHLORIDE 0.9 % (FLUSH) 0.9 %
5-40 SYRINGE (ML) INJECTION EVERY 8 HOURS
Status: DISCONTINUED | OUTPATIENT
Start: 2019-10-11 | End: 2019-10-16

## 2019-10-11 RX ORDER — LORAZEPAM 1 MG/1
1 TABLET ORAL
Status: DISCONTINUED | OUTPATIENT
Start: 2019-10-11 | End: 2019-10-13

## 2019-10-11 RX ORDER — LABETALOL 200 MG/1
600 TABLET, FILM COATED ORAL EVERY 8 HOURS
Status: DISCONTINUED | OUTPATIENT
Start: 2019-10-11 | End: 2019-10-13

## 2019-10-11 RX ORDER — LORAZEPAM 1 MG/1
1 TABLET ORAL 2 TIMES DAILY
Status: DISCONTINUED | OUTPATIENT
Start: 2019-10-11 | End: 2019-10-11

## 2019-10-11 RX ORDER — FAMOTIDINE 20 MG/1
20 TABLET, FILM COATED ORAL 2 TIMES DAILY
Status: DISCONTINUED | OUTPATIENT
Start: 2019-10-11 | End: 2019-10-16 | Stop reason: HOSPADM

## 2019-10-11 RX ORDER — NIFEDIPINE 30 MG/1
30 TABLET, EXTENDED RELEASE ORAL EVERY 12 HOURS
Status: DISCONTINUED | OUTPATIENT
Start: 2019-10-11 | End: 2019-10-12

## 2019-10-11 RX ORDER — LABETALOL HYDROCHLORIDE 5 MG/ML
20 INJECTION, SOLUTION INTRAVENOUS ONCE
Status: DISPENSED | OUTPATIENT
Start: 2019-10-11 | End: 2019-10-12

## 2019-10-11 RX ORDER — LABETALOL 300 MG/1
300 TABLET, FILM COATED ORAL EVERY 8 HOURS
COMMUNITY
End: 2019-10-16

## 2019-10-11 RX ORDER — SODIUM CHLORIDE 0.9 % (FLUSH) 0.9 %
5-40 SYRINGE (ML) INJECTION AS NEEDED
Status: DISCONTINUED | OUTPATIENT
Start: 2019-10-11 | End: 2019-10-16 | Stop reason: HOSPADM

## 2019-10-11 RX ORDER — ACETAMINOPHEN 500 MG
1000 TABLET ORAL
Status: DISCONTINUED | OUTPATIENT
Start: 2019-10-11 | End: 2019-10-16 | Stop reason: HOSPADM

## 2019-10-11 RX ORDER — PROMETHAZINE HYDROCHLORIDE 25 MG/1
25 TABLET ORAL
Status: DISCONTINUED | OUTPATIENT
Start: 2019-10-11 | End: 2019-10-16 | Stop reason: HOSPADM

## 2019-10-11 RX ORDER — LABETALOL HYDROCHLORIDE 5 MG/ML
20 INJECTION, SOLUTION INTRAVENOUS ONCE
Status: COMPLETED | OUTPATIENT
Start: 2019-10-11 | End: 2019-10-11

## 2019-10-11 RX ORDER — BUTORPHANOL TARTRATE 2 MG/ML
1 INJECTION INTRAMUSCULAR; INTRAVENOUS ONCE
Status: COMPLETED | OUTPATIENT
Start: 2019-10-11 | End: 2019-10-11

## 2019-10-11 RX ORDER — ZOLPIDEM TARTRATE 5 MG/1
5 TABLET ORAL
Status: DISCONTINUED | OUTPATIENT
Start: 2019-10-11 | End: 2019-10-16 | Stop reason: HOSPADM

## 2019-10-11 RX ORDER — LABETALOL HYDROCHLORIDE 5 MG/ML
80 INJECTION, SOLUTION INTRAVENOUS
Status: DISCONTINUED | OUTPATIENT
Start: 2019-10-11 | End: 2019-10-12

## 2019-10-11 RX ORDER — LABETALOL 200 MG/1
600 TABLET, FILM COATED ORAL EVERY 8 HOURS
Status: DISCONTINUED | OUTPATIENT
Start: 2019-10-11 | End: 2019-10-11

## 2019-10-11 RX ORDER — LABETALOL HYDROCHLORIDE 5 MG/ML
40 INJECTION, SOLUTION INTRAVENOUS
Status: COMPLETED | OUTPATIENT
Start: 2019-10-11 | End: 2019-10-11

## 2019-10-11 RX ADMIN — LABETALOL HYDROCHLORIDE 40 MG: 5 INJECTION INTRAVENOUS at 21:24

## 2019-10-11 RX ADMIN — ACETAMINOPHEN 1000 MG: 500 TABLET, FILM COATED ORAL at 19:34

## 2019-10-11 RX ADMIN — LABETALOL HYDROCHLORIDE 600 MG: 200 TABLET, FILM COATED ORAL at 15:56

## 2019-10-11 RX ADMIN — LORAZEPAM 1 MG: 1 TABLET ORAL at 21:48

## 2019-10-11 RX ADMIN — FAMOTIDINE 20 MG: 20 TABLET ORAL at 20:59

## 2019-10-11 RX ADMIN — LABETALOL HYDROCHLORIDE 20 MG: 5 INJECTION INTRAVENOUS at 20:18

## 2019-10-11 RX ADMIN — LABETALOL HYDROCHLORIDE 80 MG: 5 INJECTION INTRAVENOUS at 21:55

## 2019-10-11 RX ADMIN — NIFEDIPINE 30 MG: 30 TABLET, FILM COATED, EXTENDED RELEASE ORAL at 21:48

## 2019-10-11 RX ADMIN — BUTORPHANOL TARTRATE 1 MG: 2 INJECTION, SOLUTION INTRAMUSCULAR; INTRAVENOUS at 20:59

## 2019-10-11 RX ADMIN — PROMETHAZINE HYDROCHLORIDE 25 MG: 25 TABLET ORAL at 20:59

## 2019-10-11 NOTE — CONSULTS
Neonatology Antepartum Consultation    Name: Erin Ritter      Medical Record Number: [id number]      YOB: 1985     Today's Date: 2019                                                                 Date of Consultation:  2019  Time: 6:51 PM  Attending MD: Dr. Angela Pride  Referring Physician: Dr. Keeley Carpenter  Reason for Consultation: Prematruity    Subjective:     Age: 36 y.o.  Regan Tunas:   Gestation age: 32w6d      Maternal steroids:  yes     Objective:     Medications:   Current Facility-Administered Medications   Medication Dose Route Frequency    sodium chloride (NS) flush 5-40 mL  5-40 mL IntraVENous Q8H    sodium chloride (NS) flush 5-40 mL  5-40 mL IntraVENous PRN    acetaminophen (TYLENOL) tablet 1,000 mg  1,000 mg Oral Q6H PRN    zolpidem (AMBIEN) tablet 5 mg  5 mg Oral QHS PRN    labetalol (NORMODYNE) tablet 600 mg  600 mg Oral Q8H     Rupture of Membrane:     Meconium Stained:       Data Review:  Lab:   Lab Results   Component Value Date/Time    ABO/Rh(D) O POSITIVE 10/11/2019 02:06 PM    Antibody screen NEG 10/11/2019 02:06 PM    GrBStrep, External positive 2018 05:02 PM       Assessment:      Principal Problem:    Hypertension in pregnancy, antepartum (10/11/2019)    Active Problems:    Positive GBS test (10/11/2019)      Overview: On 19 at [facility]        OB Concerns/Plan: Mother will undergo induction at 29 + 0 weeks due to chronic HTN/headaches and multiple evaluations for preeclampsia along with admission. Common problems but not limited to, at this Gestation Age: 33w5d include respiratory distress requiring CPAP/HFNC and if worsening distress than intubation/surfactant administration, feeding problems requiring NG feeding, hypoglycemia requiring IVF administration/UVC/UAC placement and temperature regulation issues regarding isolette/RW. Mother has received Betamethasone  and .  Mother is aware that is patient has grown appropriately and appears in no distress at that time of delivery than patient can be skin to skin with mother prior to evaluation and admission to [facility]. [x]    Explained NICU coverage and team approach  [x]    Answered questions  [x]    Discussed Benefits of Breast Feeding      Total consultation time required 40 minutes, including chart review, documentation, speaking to staff and speaking to family along with answering questions. Signed: Sameera Lora MD  Date: 10/11/19  Time: 18:55

## 2019-10-11 NOTE — PROGRESS NOTES
Taken patients blood presure at this time it was 174/107 on the right arm. Pt was in being hooked up to the NST monitor at this time. Pt stated her BP spiked like this last night. Will reassess in 20 minutes. Pt stated she vomited 30 minutes. She stated she has vomited more since her BP's has increased. Pt stated she has a mild headache at this time. Will give patient tylenol to see if that takes helps the headache.

## 2019-10-11 NOTE — PROGRESS NOTES
Requested Dr. Stephania Tena to call to inform him of the patient's blood pressures and headache at this time.

## 2019-10-11 NOTE — PROGRESS NOTES
Maternal Fetal Medicine Consult Note      Requesting CHATO Skinner    Chief Complaint:  Pregnancy and HA with severe range BP. History of Present Illness:34 y.o. Elex Fall  at 33w5d by Estimated Date of Delivery: 19. Patient admitted for chronic hypertension and headaches. She complains of HA when BP severe range. [place] always with blurred area on right peripheral visual field. Worsening of visual symptoms when BP spikes. Last pm SBP into 170s; today 150s-160s. No nausea/vomiting/CP/SOA. No regular contractions, LOF, VB. Good FM. Minimal edema. No chest pain or shortness of breath. No significant reflux, nausea, constipation, or other GI complaints. Pt with known HTN disorder of pregnancy on labetalol 600mg q8hr. (Baseline CHTN- had preE at 37wk in first pregnancy, never resolved.)     Pt has been evaluated for preeclampsia both at [facility] and [facility] since 27 weeks with multiple admissions. Steroids , . Intractable Headache at 27wk resulted in MRA/V with concern severe disease. HA consistently resolves temporarily with metoclopramide and diphenhydramine (MAD protocol). AntiHTN initiated 19 due to severe range BP. Appropriate growth throughout course of BP exacerbations- most recently today. Review of Systems: A comprehensive review of systems was negative except for that written in the HPI.          Current Facility-Administered Medications:     sodium chloride (NS) flush 5-40 mL, 5-40 mL, IntraVENous, Q8H, Ivet Deleon MD    sodium chloride (NS) flush 5-40 mL, 5-40 mL, IntraVENous, PRN, Ivet Deleon MD    acetaminophen (TYLENOL) tablet 1,000 mg, 1,000 mg, Oral, Q6H PRN, Ivet Deleon MD    zolpidem [facility]) tablet 5 mg, 5 mg, Oral, QHS PRN, Ivet Deleon MD    labetalol (NORMODYNE) tablet 600 mg, 600 mg, Oral, Q8H, Ivet Deleon MD      OB History    Para Term  AB Living   4 1 1 0 1 1   SAB TAB Ectopic Molar Multiple Live Births   1 0 0 0 0 1      # Outcome Date GA Lbr Jesse/2nd Weight Sex Delivery Anes PTL Lv   4 Current            3 Term 18 37w5d / 00:41 6 lb 1 oz (2.75 kg) F Vag-Spont EPIDURAL AN N REAL   2             1 SAB                 Past Surgical History:   Procedure Laterality Date    BREAST SURGERY PROCEDURE UNLISTED      Breast lift    GASTRIC BYPASS,OBESE<150CM SHAYNA-EN-Y      HX GASTRIC BYPASS  2013    HX MASTOPEXY (BREAST LIFT)      HX ORTHOPAEDIC Right 2012    right leg - rods and pins    HX OTHER SURGICAL      Tummy Tuck    HX OTHER SURGICAL      Facial surgery x 2 after dog bite       Past Medical History:   Diagnosis Date    Abnormal Papanicolaou smear of cervix     LEEP     Anemia     Asthma     childhood, does not use inhaler    Depression     Zoloft    Essential hypertension     Gestational hypertension     Iron deficiency anemia        Family History   Problem Relation Age of Onset    Anxiety Mother     Anxiety Father     Hypertension Father     Other Other         depression, CAD, anxiety, ADD, bipolar       No Known Allergies    Current Facility-Administered Medications   Medication Dose Route Frequency    sodium chloride (NS) flush 5-40 mL  5-40 mL IntraVENous Q8H    sodium chloride (NS) flush 5-40 mL  5-40 mL IntraVENous PRN    acetaminophen (TYLENOL) tablet 1,000 mg  1,000 mg Oral Q6H PRN    zolpidem (AMBIEN) tablet 5 mg  5 mg Oral QHS PRN    labetalol (NORMODYNE) tablet 600 mg  600 mg Oral Q8H       Social History     Socioeconomic History    Marital status: SINGLE     Spouse name: Not on file    Number of children: Not on file    Years of education: Not on file    Highest education level: Not on file   Occupational History    Not on file   Social Needs    Financial resource strain: Not on file    Food insecurity:     Worry: Not on file     Inability: Not on file    Transportation needs:     Medical: Not on file     Non-medical: Not on file   Tobacco Use    Smoking status: Former Smoker     Last attempt to quit: 2017     Years since quittin.7    Smokeless tobacco: Never Used   Substance and Sexual Activity    Alcohol use: Yes     Comment: 2 per night    Drug use: No    Sexual activity: Not on file     Comment: Nexplanon   Lifestyle    Physical activity:     Days per week: Not on file     Minutes per session: Not on file    Stress: Not on file   Relationships    Social connections:     Talks on phone: Not on file     Gets together: Not on file     Attends Denominational service: Not on file     Active member of club or organization: Not on file     Attends meetings of clubs or organizations: Not on file     Relationship status: Not on file    Intimate partner violence:     Fear of current or ex partner: Not on file     Emotionally abused: Not on file     Physically abused: Not on file     Forced sexual activity: Not on file   Other Topics Concern     Service Not Asked    Blood Transfusions Not Asked    Caffeine Concern Not Asked    Occupational Exposure Not Asked   Chilango Moros Hazards Not Asked    Sleep Concern Not Asked    Stress Concern Not Asked    Weight Concern Not Asked    Special Diet Not Asked    Back Care Not Asked    Exercise Not Asked    Bike Helmet Not Asked   [address] Yes    Self-Exams Not Asked   Social History Narrative            , Robbin Merles)                DaughterIndio 2018        Denies physical or sexual abuse         Vitals:    Patient Vitals for the past 24 hrs:   BP   10/11/19 1400 (!) 141/94   10/11/19 1350 (!) 148/97   10/11/19 1341 (!) 148/96   10/11/19 1331 (!) 144/94     Temp (24hrs), Av.8 °F (36.6 °C), Min:97.8 °F (36.6 °C), Max:97.8 °F (36.6 °C)      I&O:  No intake/output data recorded. No intake/output data recorded. Exam:    Patient without distress. Abdomen: soft, non-tender.    Fundus: soft and non tender  Right Upper Quadrant: non-tender  Genitourinary: deferred as without complaints of labor or ROM  Lower Extremity Edema: 1+  Patellar Reflexes: 1+ bilaterally  Clonus: absent  Skin: normal coloration and turgor, no rashes, no suspicious skin lesions noted. Neurologic: negative  Psychiatric: non focal    Maternal and Fetal Monitoring:                              Uterine Activity: Frequency (min): occ                           Fetal Heart Rate: Mode: ExternalFetal Heart Rate: 125      NST:  · Indications: HTN exacerbation  · Time >20 minutes  · Results:  Reactive  · FHR Baseline Rate:  120  · Periodic Changes: Mod chinmay, no decels  · Comments: reassuring. Repeat:  As clinically indicated     Labs:   CBC:    Recent Labs     10/11/19  1406 08/30/19  1305 07/18/19  0854 01/29/19  1528   WBC 12.7* 13.2* 9.8 8.3   HGB 11.1* 12.3 13.0 15.7*   HCT 34.0* 35.9 38.6 46.2    287 257 325       CMP:   Recent Labs     10/11/19  1406 08/30/19  1305 07/18/19  0854   * 138 137   K 4.0 3.8 4.1    106 105   CO2 23 24 23   AGAP 6* 8 9   GLU 90 80 80   BUN 11 8 10   CREA 0.65 0.56* 0.64   GFRAA >60 >60 >60   GFRNA >60 >60 >60   CA 8.3 8.6 8.3   ALB 2.6* 2.8* 2.9*   TP 6.3 6.5 6.4   GLOB 3.7* 3.7* 3.5   AGRAT 0.7* 0.8* 0.8*   SGOT 18 16 12*   ALT 21 18 19       Recent Labs     10/11/19  1406 08/30/19  1400 08/30/19  1305   URICA 4.1  --  3.1     --  156   PUQ  --  270  --      COAGS:    Recent Labs     01/29/19  1528   APTT 27.2   PTP 12.7   INR 1.0     Recent Glucose Results: Recent Glucose Results:   Recent Labs     10/11/19  1406 08/30/19  1305 07/18/19  0854   GLU 90 80 80     Prenatal Labs:    Lab Results   Component Value Date/Time    GrBASIMtreelpidio, External positive 06/25/2018 05:02 PM       Assessment and Plan:    Chronic HTN with superimposed preeclampsia with severe features. Patient is currently taking medications for blood pressure control. BP stable today. Hypertensive disorders of pregnancy occupy a continuum of varying severity.  In the vast majority of cases they are associated with new-onset hypertension, which occurs most often after 20 weeks of gestation and frequently near term. Although often accompanied by new-onset proteinuria, hypertension and other signs or symptoms of preeclampsia may present in some women in the absence of proteinuria. Gestational hypertension without proteinuria will have similar impact on maternal and fetal condition as in pregnancies with proteinuria and diagnosis of preeclampsia. Clinical decision making should be predicated upon severity of hypertension rather than presence or absence of proteinuria. Diagnosis of preeclampsia depends on development of elevated BP (SBP>140, DBP >90) beyond 20 weeks gestation or worsening of chronic HTN. Severe blood pressures, SBP>160 or DBP>110 or higher on two occasions 4 hours apart, on bedrest (unless antihypertensive therapy is initiated before this time) are a criteria of severe disease. Proteinuria (>300mg/24hr or protein:creatine ratio >0.3) may or may not be present. The presence or absence of proteinuria is not predictive of maternal or fetal outcome. If proteinuria is absent, diagnosis requires at least one of the following features of severe disease:  · thrombocytopenia (platelet count <424F)  · impaired liver function (transaminaases increased greater than 2x normal)  · new renal insufficiency (Cr >1.1, or doubling in absence of other renal disease)  · pulmonary edema  · new onset of cerebral/visual disturbances. No therapy is recommended for new onset HTN for blood pressures consistently in mild range (SBP<160, DBP<110). However, use of anti-hypertensive therapy is recommended if SBP >160, DBP >110. First line therapy should be nifedipine or labetalol. If patient is requiring anti-htn medication initiation, she should remain in-patient to ensure stability.      In those with mild range BP (<160 SBP, <110 DBP) and without maternal symptoms, magnesium sulfate for seizure prophylaxis is not necessary. If diagnosis prior to 34 weeks, initiate steroids for fetal maturity; consider late   steroids if 34-37 weeks. Growth surveillance upon diagnosis recommended. Without evidence of severe disease, compliant patients  may qualify for outpatient management on a case by case basis. If would like home BP monitoring, please consult social work to aid in setting this up. Will need twice weekly fetal surveillance for remainder of pregnancy. Recommendation for Delivery Timing for chronic HTN with superimposed preeclampsia with severe features is at 34 weeks or diagnosis, all timing adjusted based on maternal and fetal condition. Initiation of magnesium sulfate 6gm bolus, 2gm basal for severe disease. Mode of delivery based on obstetric indications, rather than diagnosis of hypertensive disorder in pregnancy. POSTPARTUM CARE  · Fluid shifts should be closely monitored in the postpartum setting, with strict I&Os every shift and daily weights. If diuresis has not occurred within 48 hours of delivery, patient is at elevated risk of pulmonary edema. · NSAIDs should be used preferentially over opiates for obstetric pain. However, use with caution postpartum in women with severe hypertension persisting for more than 1 days after delivery due to theoretic risk of exacerbating HTN. · BP monitoring as an inpatient for at least 72 hours postpartum, and again 7-10 days after delivery; consider home blood pressure monitoring. ([facility] preeclampsia monitoring program continues through 14 days pp.)   · If persistent HTN postpartum (SBP>150, DBP>100), recommend initiation of antihypertensive therapy. Patient has increased risk of cardiovascular and cerebrovascular events in the future.  A recent metaanalysis by Blane Keller al. demonstrated that over a mean follow-up of 14.1 years, the relative risk of developing chronic hypertension in those with a history of preeclampsia was 3.7. In addition, it predisposes to microalbuminuria and long lasting renal disease. Carefully chosen antihypertensives can lower the risk for both kidney and cardiac events among those with hypertension. All women with history of hypertensive disorders of pregnancy should maintain a healthy weight, stay active, avoid tobacco use, and be evaluated regularly for cardiovascular disease. The risk of recurrence (of any Hypertensive Disorder of Pregnancy) is approximately 40%. In future pregnancies, patient should initiate 162mg ASA by 14 weeks. Timing of delivery recommendations are based on ACOG Committee Opinion #560, April 2013; \"Hypertension in Zero [place]" task force report by ACOG, Nov 2013; ACOG Practice Bulletins 202 and 203, December 2018. Discussed with patient the long-term and recurrence risks associated with hypertensive disorder of pregnancy. With a history of gestational HTN/ preeclampsia/HELLP in current or past pregnancies, patient has increased risk of cardiovascular and cerebrovascular events in the future. A recent metaanalysis by Jasmeet La al. demonstrated that over a mean follow-up of 14.1 years, the relative risk of developing chronic hypertension in those with a history of preeclampsia was 3.7. In addition, it predisposes to microalbuminuria and long lasting renal disease. Carefully chosen antihypertensives can lower the risk for both kidney and cardiac events among those with hypertension. All women with history of hypertensive disorders of pregnancy should maintain a healthy weight, stay active, avoid tobacco use, and be evaluated regularly for cardiovascular disease. Her risk of recurrence (of any Hypertensive Disorder of Pregnancy) is approximately 40%. In future pregnancies, low dose Aspirin x 2 tablets (162 mg daily) is recommended to be started at 12-16 weeks; some benefit seen with starting up to 28 weeks. Ronda DUMONT with evidence of severe exacerbation    HELLP labs stable. 24hr urine pending  Do not recommend repeating steroids. May add procardia 30mg XL BID if severe BP. Reglan/benadryl for HA. Mag with labor/ripening    Delivery at 34 weeks, sooner if worsening BP prior. GBS positive will need abx. Yeison consult. Signed By:  Desirae Mckeon MD     October 11, 2019         Time:110    Minutes spent on floor,with greater than 50% of the time examining patient, explaining plan and coordinating care with nurse and requesting primary physician.

## 2019-10-11 NOTE — PROGRESS NOTES
Pt presents for direct admit for Pre E Work up. 24 hour urine begins at 1315. Pt orientated to room.

## 2019-10-12 PROBLEM — O16.9 HYPERTENSION IN PREGNANCY: Status: ACTIVE | Noted: 2019-10-12

## 2019-10-12 LAB
ALBUMIN SERPL-MCNC: 2.2 G/DL (ref 3.5–5)
ALBUMIN/GLOB SERPL: 0.6 {RATIO} (ref 1.2–3.5)
ALP SERPL-CCNC: 142 U/L (ref 50–136)
ALT SERPL-CCNC: 13 U/L (ref 12–65)
ANION GAP SERPL CALC-SCNC: 4 MMOL/L (ref 7–16)
AST SERPL-CCNC: 17 U/L (ref 15–37)
BASOPHILS # BLD: 0 K/UL (ref 0–0.2)
BASOPHILS NFR BLD: 1 % (ref 0–2)
BILIRUB SERPL-MCNC: 0.2 MG/DL (ref 0.2–1.1)
BUN SERPL-MCNC: 6 MG/DL (ref 6–23)
CALCIUM SERPL-MCNC: 8 MG/DL (ref 8.3–10.4)
CHLORIDE SERPL-SCNC: 110 MMOL/L (ref 98–107)
CO2 SERPL-SCNC: 24 MMOL/L (ref 21–32)
COLLECT DURATION TIME UR: 24 HR
COLLECT DURATION TIME UR: 24 HR
CREAT 24H UR-MRATE: 1250.5 MG/24 HR (ref 600–1800)
CREAT SERPL-MCNC: 0.56 MG/DL (ref 0.6–1)
CREAT UR-MCNC: 41 MG/DL
DIFFERENTIAL METHOD BLD: ABNORMAL
EOSINOPHIL # BLD: 0.1 K/UL (ref 0–0.8)
EOSINOPHIL NFR BLD: 1 % (ref 0.5–7.8)
ERYTHROCYTE [DISTWIDTH] IN BLOOD BY AUTOMATED COUNT: 13.3 % (ref 11.9–14.6)
GLOBULIN SER CALC-MCNC: 3.4 G/DL (ref 2.3–3.5)
GLUCOSE SERPL-MCNC: 84 MG/DL (ref 65–100)
HCT VFR BLD AUTO: 34.5 % (ref 35.8–46.3)
HGB BLD-MCNC: 11 G/DL (ref 11.7–15.4)
IMM GRANULOCYTES # BLD AUTO: 0.1 K/UL (ref 0–0.5)
IMM GRANULOCYTES NFR BLD AUTO: 1 % (ref 0–5)
LYMPHOCYTES # BLD: 2.2 K/UL (ref 0.5–4.6)
LYMPHOCYTES NFR BLD: 25 % (ref 13–44)
MCH RBC QN AUTO: 27.6 PG (ref 26.1–32.9)
MCHC RBC AUTO-ENTMCNC: 31.9 G/DL (ref 31.4–35)
MCV RBC AUTO: 86.5 FL (ref 79.6–97.8)
MONOCYTES # BLD: 0.7 K/UL (ref 0.1–1.3)
MONOCYTES NFR BLD: 7 % (ref 4–12)
NEUTS SEG # BLD: 5.8 K/UL (ref 1.7–8.2)
NEUTS SEG NFR BLD: 66 % (ref 43–78)
NRBC # BLD: 0 K/UL (ref 0–0.2)
PLATELET # BLD AUTO: 278 K/UL (ref 150–450)
PMV BLD AUTO: 10.3 FL (ref 9.4–12.3)
POTASSIUM SERPL-SCNC: 3.7 MMOL/L (ref 3.5–5.1)
PROT 24H UR-MRATE: 336 MG/24HR
PROT SERPL-MCNC: 5.6 G/DL (ref 6.3–8.2)
PROT UR-MCNC: 11 MG/DL
RBC # BLD AUTO: 3.99 M/UL (ref 4.05–5.2)
SODIUM SERPL-SCNC: 138 MMOL/L (ref 136–145)
SPECIMEN VOL ?TM UR: 3050 ML
SPECIMEN VOL ?TM UR: 3050 ML
URATE SERPL-MCNC: 3.8 MG/DL (ref 2.6–6)
WBC # BLD AUTO: 8.9 K/UL (ref 4.3–11.1)

## 2019-10-12 PROCEDURE — 99218 HC RM OBSERVATION: CPT

## 2019-10-12 PROCEDURE — 4A1HXCZ MONITORING OF PRODUCTS OF CONCEPTION, CARDIAC RATE, EXTERNAL APPROACH: ICD-10-PCS | Performed by: OBSTETRICS & GYNECOLOGY

## 2019-10-12 PROCEDURE — 74011250637 HC RX REV CODE- 250/637: Performed by: OBSTETRICS & GYNECOLOGY

## 2019-10-12 PROCEDURE — 85025 COMPLETE CBC W/AUTO DIFF WBC: CPT

## 2019-10-12 PROCEDURE — 65270000029 HC RM PRIVATE

## 2019-10-12 PROCEDURE — 3E033VJ INTRODUCTION OF OTHER HORMONE INTO PERIPHERAL VEIN, PERCUTANEOUS APPROACH: ICD-10-PCS | Performed by: OBSTETRICS & GYNECOLOGY

## 2019-10-12 PROCEDURE — 74011250636 HC RX REV CODE- 250/636: Performed by: OBSTETRICS & GYNECOLOGY

## 2019-10-12 PROCEDURE — 36415 COLL VENOUS BLD VENIPUNCTURE: CPT

## 2019-10-12 PROCEDURE — 10907ZC DRAINAGE OF AMNIOTIC FLUID, THERAPEUTIC FROM PRODUCTS OF CONCEPTION, VIA NATURAL OR ARTIFICIAL OPENING: ICD-10-PCS | Performed by: OBSTETRICS & GYNECOLOGY

## 2019-10-12 PROCEDURE — 80053 COMPREHEN METABOLIC PANEL: CPT

## 2019-10-12 PROCEDURE — 84550 ASSAY OF BLOOD/URIC ACID: CPT

## 2019-10-12 RX ORDER — MAGNESIUM SULFATE HEPTAHYDRATE 40 MG/ML
4 INJECTION, SOLUTION INTRAVENOUS ONCE
Status: DISPENSED | OUTPATIENT
Start: 2019-10-12 | End: 2019-10-13

## 2019-10-12 RX ORDER — HYDRALAZINE HYDROCHLORIDE 20 MG/ML
10 INJECTION INTRAMUSCULAR; INTRAVENOUS AS NEEDED
Status: DISCONTINUED | OUTPATIENT
Start: 2019-10-12 | End: 2019-10-12

## 2019-10-12 RX ORDER — OXYCODONE HYDROCHLORIDE 5 MG/1
10 TABLET ORAL
Status: DISCONTINUED | OUTPATIENT
Start: 2019-10-12 | End: 2019-10-12

## 2019-10-12 RX ORDER — HYDRALAZINE HYDROCHLORIDE 20 MG/ML
10 INJECTION INTRAMUSCULAR; INTRAVENOUS AS NEEDED
Status: DISCONTINUED | OUTPATIENT
Start: 2019-10-12 | End: 2019-10-16 | Stop reason: HOSPADM

## 2019-10-12 RX ORDER — POLYETHYLENE GLYCOL 3350 17 G/17G
17 POWDER, FOR SOLUTION ORAL
Status: DISCONTINUED | OUTPATIENT
Start: 2019-10-12 | End: 2019-10-16 | Stop reason: HOSPADM

## 2019-10-12 RX ORDER — OXYCODONE AND ACETAMINOPHEN 5; 325 MG/1; MG/1
2 TABLET ORAL
Status: DISCONTINUED | OUTPATIENT
Start: 2019-10-12 | End: 2019-10-16 | Stop reason: HOSPADM

## 2019-10-12 RX ORDER — LORAZEPAM 1 MG/1
1 TABLET ORAL
Status: COMPLETED | OUTPATIENT
Start: 2019-10-12 | End: 2019-10-12

## 2019-10-12 RX ORDER — SERTRALINE HYDROCHLORIDE 50 MG/1
100 TABLET, FILM COATED ORAL DAILY
Status: DISCONTINUED | OUTPATIENT
Start: 2019-10-12 | End: 2019-10-16 | Stop reason: HOSPADM

## 2019-10-12 RX ORDER — MAGNESIUM SULFATE HEPTAHYDRATE 40 MG/ML
2 INJECTION, SOLUTION INTRAVENOUS CONTINUOUS
Status: DISCONTINUED | OUTPATIENT
Start: 2019-10-12 | End: 2019-10-16

## 2019-10-12 RX ORDER — OXYCODONE HYDROCHLORIDE 5 MG/1
5 TABLET ORAL
Status: COMPLETED | OUTPATIENT
Start: 2019-10-12 | End: 2019-10-12

## 2019-10-12 RX ADMIN — MISOPROSTOL 25 MCG: 100 TABLET ORAL at 20:18

## 2019-10-12 RX ADMIN — HYDRALAZINE HYDROCHLORIDE 10 MG: 20 INJECTION INTRAMUSCULAR; INTRAVENOUS at 19:50

## 2019-10-12 RX ADMIN — SERTRALINE HYDROCHLORIDE 100 MG: 50 TABLET ORAL at 12:09

## 2019-10-12 RX ADMIN — FAMOTIDINE 20 MG: 20 TABLET ORAL at 08:33

## 2019-10-12 RX ADMIN — LABETALOL HYDROCHLORIDE 200 MG: 200 TABLET, FILM COATED ORAL at 22:18

## 2019-10-12 RX ADMIN — LORAZEPAM 1 MG: 1 TABLET ORAL at 19:50

## 2019-10-12 RX ADMIN — LABETALOL HYDROCHLORIDE 600 MG: 200 TABLET, FILM COATED ORAL at 14:36

## 2019-10-12 RX ADMIN — LORAZEPAM 1 MG: 1 TABLET ORAL at 17:10

## 2019-10-12 RX ADMIN — FAMOTIDINE 20 MG: 20 TABLET ORAL at 17:10

## 2019-10-12 RX ADMIN — LABETALOL HYDROCHLORIDE 400 MG: 200 TABLET, FILM COATED ORAL at 22:10

## 2019-10-12 RX ADMIN — LABETALOL HYDROCHLORIDE 600 MG: 200 TABLET, FILM COATED ORAL at 07:23

## 2019-10-12 RX ADMIN — ACETAMINOPHEN 1000 MG: 500 TABLET, FILM COATED ORAL at 09:42

## 2019-10-12 RX ADMIN — OXYCODONE HYDROCHLORIDE AND ACETAMINOPHEN 2 TABLET: 5; 325 TABLET ORAL at 16:05

## 2019-10-12 RX ADMIN — NIFEDIPINE 30 MG: 30 TABLET, FILM COATED, EXTENDED RELEASE ORAL at 08:33

## 2019-10-12 RX ADMIN — OXYCODONE HYDROCHLORIDE 5 MG: 5 TABLET ORAL at 12:10

## 2019-10-12 NOTE — PROGRESS NOTES
Dr. Pretty Brown given update on SVE, orders for cytotec 25 mcg 1st dose, if response continue with 25 mcg if not go to 50 mcg with next dose. Start magnesium 4 gm bolus with 2 gm maintenance once pt is in active labor, BP goes up or pt c/o severe HA. Pt may have epidural when desired.

## 2019-10-12 NOTE — PROGRESS NOTES
Pt stated she has having blurred vision at this time. Pt requested she wanted a snack to eat from her food tray. Pt was given pheregan, stadol, and pepcid.

## 2019-10-12 NOTE — PROGRESS NOTES
Pt stated she is having pain in lower chest and between back. (epigastric pain). Dr. Samreen Fischer called to check on patient. Informed him of the patients current condition of complaining of epigastric pain 7/10 and headache is now 2/10 and latest BPs (See Flow sheet)  He stated to hold ativan and give stadol and phenergan at this time along with Pepcid for epigastric pain.

## 2019-10-12 NOTE — PROGRESS NOTES
Dr. Ovidio Bass called unit and orders received to stop po [place]. Orders received to start cytotec if unfavorable or if favorable start pitocin in am. Also once patient begins to feel contractions start magnesium sulfate 4 gm bolus followed by 2 gm/hr maintenance. If pt's BP's become severe may have prn hydralazine per protocol.

## 2019-10-12 NOTE — PROGRESS NOTES
IV Labetalol given to patient. Pt tolerated well. Pt stated her headache pain is now 2/10 from 4/10 after Tylenol was given.

## 2019-10-12 NOTE — PROGRESS NOTES
Report given to Dr. Ovidio Bass per LEILANI Baptiste RN of pt's 24 hr urine. Orders are to observe pt's as per policy for hypertension. We will follow up with MFM.

## 2019-10-12 NOTE — PROGRESS NOTES
Morning assessment completed. Pt denies c/o headache, RUQ pain. Reports some blurry vision this morning. Pt is unsure if \"blurry vision caused from all the medications they gave me last night\". /100, sitting. Labetalol given as ordered. Will uday   BP in an hour. Pt to report headache, RUQ pain, heartburn, chest pain and/or prn. Pt verbalizes understanding.

## 2019-10-12 NOTE — PROGRESS NOTES
Dr. Jacky Constantino returned call. Informed of the patients blood pressures and mild headache. He stated to initiate the labetalol protocol.

## 2019-10-12 NOTE — PHYSICIAN ADVISORY
[facility] Physician Advisor Recommendation The information in this document is a recommendation to be used for utilization review and utilization management purposes only. This recommendation is not an order. The recommendation is made based on the information reviewed at the time of the referral, is pursuant to the [facility] Conditions of Participation (42 CFR Part 482), and is neither a judgment nor an assessment with regard to the appropriateness or quality of clinical care. Nothing in this document may be used to limit, alter, or affect clinical services provided to the patient named below. The provider of services is ultimately responsible for the submission of a claim that has met all requirements for correct coding, billing, and reimbursement. Letter of Status Determination:  
Recommend hospitalization status upgraded from OBSERVATION  to INPATIENT  Status Pt Name:  Maddy Thompson MR#  
 [id number] / 
Elo Din: Eboni Jaeger / Plan: Rocky Pittman / Product Type: HMO /   
CSN#  [id number] [facility]  438/01  @ [address] [place] [address] Hospitalization date  10/11/2019  1:08 PM  
Current Attending Physician  Nay Moyer MD  
Principal diagnosis  Hypertension in pregnancy, antepartum [O16.9] Clinicals  36 y.o.  female hospitalized with above diagnosis and preeclampsia Chronic HTN with superimposed preeclampsia with severe features. Patient is currently taking medications for blood pressure control [place] (MCG) criteria Does  NOT apply Severe blood pressures, SBP>160 or DBP>110 or higher on two occasions 4 hours apart, on bedrest (unless antihypertensive therapy is initiated before this time) are a criteria of severe disease. STATUS DETERMINATION Upgrade to Inpatient Additional comments Payor: Eboni Jaeger / Plan: [facility] HMO/CHOICE PLUS/POS / Product Type: HMO /   
  
 Fide Mcallister M.D.,  [facility] [id number] [facility] T: [phone]

## 2019-10-12 NOTE — PROGRESS NOTES
MFM Chart Review. Events of past 24hr reviewed as well as all lab results. Labile bp to severe range requiring IV protocol. >300 protein which is new. Headaches improving but not resolving with narcotic pain medication. My recommendation is to proceed with induction of labor at 34w0 or if further severe bp sooner. Start magnesium sulfate if any more severe BP or once ripening starts.     Wi ll need antibiotics for gbs+      Will De La Cruz MD

## 2019-10-12 NOTE — PROGRESS NOTES
Dr. Mary Carmen Stokes at bedside. Pt still with c/o headache. May have 5 mg oxycodone now, and if headache not resolved, then may have  Another 5 mg. Pt takes  100 mg zoloft every day, will resume home dose here in hospital.      Will see results of 24 hour urine and how BP's respond today.

## 2019-10-12 NOTE — H&P
History & Physical    Name: Enos Halsted MRN: [id number]  SSN: [id number]    YOB: 1985  Age: 36 y.o. Sex: female      Subjective:     Reason for Admission:  Pregnancy and Chronic Hypertension and possible super-imposed Pre E. History of Present Illness: Ms. Tana Barriga is a 36 y.o.  female with an estimated gestational age of 32w10d with Estimated Date of Delivery: 19. Patient complains of mild headache  for multiple days. Pregnancy has been complicated by chronic hypertension. Patient denies abdominal pain  , chest pain, contractions, right upper quadrant pain  , shortness of breath, swelling, vaginal bleeding  and vaginal leaking of fluid . OB History    Para Term  AB Living   4 1 1 0 1 1   SAB TAB Ectopic Molar Multiple Live Births   1 0 0 0 0 1      # Outcome Date GA Lbr Jesse/2nd Weight Sex Delivery Anes PTL Lv   4 Current            3 Term 18 37w5d / 00:41 2.75 kg F Vag-Spont EPIDURAL AN N REAL   2             1 SAB              Past Medical History:   Diagnosis Date    Abnormal Papanicolaou smear of cervix     LEEP 2003    Anemia     Asthma     childhood, does not use inhaler    Depression     Zoloft    Essential hypertension     Gestational hypertension     Iron deficiency anemia      Past Surgical History:   Procedure Laterality Date    BREAST SURGERY PROCEDURE UNLISTED      Breast lift    GASTRIC BYPASS,OBESE<150CM SHAYNA-EN-Y      HX GASTRIC BYPASS  2013    HX MASTOPEXY (BREAST LIFT)      HX ORTHOPAEDIC Right 2012    right leg - rods and pins    HX OTHER SURGICAL      Tummy Tuck    HX OTHER SURGICAL      Facial surgery x 2 after dog bite     Social History     Occupational History    Not on file   Tobacco Use    Smoking status: Former Smoker     Last attempt to quit: 2017     Years since quittin.7    Smokeless tobacco: Never Used   Substance and Sexual Activity    Alcohol use: Yes     Comment: 2 per night    Drug use:  No  Sexual activity: Not on file     Comment: Nexplanon      Family History   Problem Relation Age of Onset    Anxiety Mother     Anxiety Father     Hypertension Father     Other Other         depression, CAD, anxiety, ADD, bipolar       No Known Allergies  Prior to Admission medications    Medication Sig Start Date End Date Taking? Authorizing Provider   labetalol (NORMODYNE) 300 mg tablet Take 300 mg by mouth every eight (8) hours. Yes Provider, Historical   sertraline (ZOLOFT) 50 mg tablet TAKE 1/2 TABLET FOR 6 DAYS THEN INCREASE TO 1 TABLET IF NEEDED 19  Yes Provider, Historical   PNV66-Iron Fumarate-FA-DSS-DHA 26-1.2- mg cap Take  by mouth. Yes Provider, Historical   ondansetron (ZOFRAN ODT) 8 mg disintegrating tablet Take 1 Tab by mouth every eight (8) hours as needed for Nausea. 19  Yes Adriana Wilson MD        Review of Systems:  A comprehensive review of systems was negative except for that written in the History of Present Illness. Objective:     Vitals:    Vitals:    10/12/19 0230 10/12/19 0513 10/12/19 0722 10/12/19 0834   BP: 112/61 117/70 (!) 170/100 (!) 143/95   Pulse: 75 69 64 88   Resp:  16 18    Temp:   97.7 °F (36.5 °C)    SpO2:       Weight:       Height:          Temp (24hrs), Av.9 °F (36.6 °C), Min:97.7 °F (36.5 °C), Max:98.2 °F (36.8 °C)    BP  Min: 112/61  Max: 175/109     Physical Exam:  Patient without distress.   Heart: Regular rate and rhythm  Lung: clear to auscultation throughout lung fields, no wheezes, no rales, no rhonchi and normal respiratory effort  Abdomen: soft, nontender     Membranes:  Intact  Uterine Activity:  None  Fetal Heart Rate:  Reactive       Lab/Data Review:  Recent Results (from the past 24 hour(s))   TYPE & SCREEN    Collection Time: 10/11/19  2:06 PM   Result Value Ref Range    Crossmatch Expiration 10/14/2019     ABO/Rh(D) Lingell Beltrami POSITIVE     Antibody screen NEG    LD    Collection Time: 10/11/19  2:06 PM   Result Value Ref Range     100 - 190 U/L   URIC ACID    Collection Time: 10/11/19  2:06 PM   Result Value Ref Range    Uric acid 4.1 2.6 - 6.0 MG/DL   CBC W/O DIFF    Collection Time: 10/11/19  2:06 PM   Result Value Ref Range    WBC 12.7 (H) 4.3 - 11.1 K/uL    RBC 3.97 (L) 4.05 - 5.2 M/uL    HGB 11.1 (L) 11.7 - 15.4 g/dL    HCT 34.0 (L) 35.8 - 46.3 %    MCV 85.6 79.6 - 97.8 FL    MCH 28.0 26.1 - 32.9 PG    MCHC 32.6 31.4 - 35.0 g/dL    RDW 13.3 11.9 - 14.6 %    PLATELET 335 566 - 760 K/uL    MPV 10.3 9.4 - 12.3 FL    ABSOLUTE NRBC 0.00 0.0 - 0.2 K/uL   METABOLIC PANEL, COMPREHENSIVE    Collection Time: 10/11/19  2:06 PM   Result Value Ref Range    Sodium 134 (L) 136 - 145 mmol/L    Potassium 4.0 3.5 - 5.1 mmol/L    Chloride 105 98 - 107 mmol/L    CO2 23 21 - 32 mmol/L    Anion gap 6 (L) 7 - 16 mmol/L    Glucose 90 65 - 100 mg/dL    BUN 11 6 - 23 MG/DL    Creatinine 0.65 0.6 - 1.0 MG/DL    GFR est AA >60 >60 ml/min/1.73m2    GFR est non-AA >60 >60 ml/min/1.73m2    Calcium 8.3 8.3 - 10.4 MG/DL    Bilirubin, total 0.2 0.2 - 1.1 MG/DL    ALT (SGPT) 21 12 - 65 U/L    AST (SGOT) 18 15 - 37 U/L    Alk.  phosphatase 146 (H) 50 - 136 U/L    Protein, total 6.3 6.3 - 8.2 g/dL    Albumin 2.6 (L) 3.5 - 5.0 g/dL    Globulin 3.7 (H) 2.3 - 3.5 g/dL    A-G Ratio 0.7 (L) 1.2 - 3.5     BILIRUBIN, DIRECT    Collection Time: 10/11/19  2:06 PM   Result Value Ref Range    Bilirubin, direct 0.1 <0.4 MG/DL   CBC WITH AUTOMATED DIFF    Collection Time: 10/12/19  5:53 AM   Result Value Ref Range    WBC 8.9 4.3 - 11.1 K/uL    RBC 3.99 (L) 4.05 - 5.2 M/uL    HGB 11.0 (L) 11.7 - 15.4 g/dL    HCT 34.5 (L) 35.8 - 46.3 %    MCV 86.5 79.6 - 97.8 FL    MCH 27.6 26.1 - 32.9 PG    MCHC 31.9 31.4 - 35.0 g/dL    RDW 13.3 11.9 - 14.6 %    PLATELET 170 111 - 724 K/uL    MPV 10.3 9.4 - 12.3 FL    ABSOLUTE NRBC 0.00 0.0 - 0.2 K/uL    DF AUTOMATED      NEUTROPHILS 66 43 - 78 %    LYMPHOCYTES 25 13 - 44 %    MONOCYTES 7 4.0 - 12.0 %    EOSINOPHILS 1 0.5 - 7.8 % BASOPHILS 1 0.0 - 2.0 %    IMMATURE GRANULOCYTES 1 0.0 - 5.0 %    ABS. NEUTROPHILS 5.8 1.7 - 8.2 K/UL    ABS. LYMPHOCYTES 2.2 0.5 - 4.6 K/UL    ABS. MONOCYTES 0.7 0.1 - 1.3 K/UL    ABS. EOSINOPHILS 0.1 0.0 - 0.8 K/UL    ABS. BASOPHILS 0.0 0.0 - 0.2 K/UL    ABS. IMM. GRANS. 0.1 0.0 - 0.5 K/UL   METABOLIC PANEL, COMPREHENSIVE    Collection Time: 10/12/19  5:53 AM   Result Value Ref Range    Sodium 138 136 - 145 mmol/L    Potassium 3.7 3.5 - 5.1 mmol/L    Chloride 110 (H) 98 - 107 mmol/L    CO2 24 21 - 32 mmol/L    Anion gap 4 (L) 7 - 16 mmol/L    Glucose 84 65 - 100 mg/dL    BUN 6 6 - 23 MG/DL    Creatinine 0.56 (L) 0.6 - 1.0 MG/DL    GFR est AA >60 >60 ml/min/1.73m2    GFR est non-AA >60 >60 ml/min/1.73m2    Calcium 8.0 (L) 8.3 - 10.4 MG/DL    Bilirubin, total 0.2 0.2 - 1.1 MG/DL    ALT (SGPT) 13 12 - 65 U/L    AST (SGOT) 17 15 - 37 U/L    Alk. phosphatase 142 (H) 50 - 136 U/L    Protein, total 5.6 (L) 6.3 - 8.2 g/dL    Albumin 2.2 (L) 3.5 - 5.0 g/dL    Globulin 3.4 2.3 - 3.5 g/dL    A-G Ratio 0.6 (L) 1.2 - 3.5     URIC ACID    Collection Time: 10/12/19  5:53 AM   Result Value Ref Range    Uric acid 3.8 2.6 - 6.0 MG/DL       Assessment and Plan:     Principal Problem:    Hypertension in pregnancy, antepartum (10/11/2019)    Active Problems:    Positive GBS test (10/11/2019)      Overview: On 9/1/19 at [facility]       Was seen in office yesterday with elevated BP and HA. Has had HA and episodes like this since early this pregnancy and last.  Was delivered at 37 weeks last time. Now had the increase BP at 28 weeks, with the HA, Admitted at [facility] and was put on Mag and they were looking at possible delivery, yet testing negative and with BP controlled, sent home on Labetalol. Has not been responding to the Labetalol, even the IV to control. Known with high Anxiety, taking Zoloft 100 mg QHS, did not get does last night. Giving now. Did better with Ativan 1 mg and sleep through the night.   Started Procardia and will see if maybe can be contolled better with this. Treat her HA, and if does well, wait on the IOL tonight. Labs remain normal, awaiting the 24 hour urine yet did dip negative for protein. Otherwise, do the cytotec tonight and IOL in am if cervix is unfavorable,  Would need Mag in labor. May do Hydralazine 10 mg IV is having BP spikes, since poor response to the labetalol.

## 2019-10-12 NOTE — PROGRESS NOTES
BP's reviewed with Dr. Estela Guerra. Pt may eat. Per MD, will make decision regarding IOL later this afternoon.

## 2019-10-12 NOTE — PROGRESS NOTES
Pt given 2 percocet as ordered for headache. Pt reports blurry vision at this time. Pt has expressed frustration that her  is out of town and they are unsure if he should fly back yet. Pt teary eyed at this time. Talked with pt and let her vent. BP was elevated but within parameters given. Talked with pt about taking her ativan that is prn to try and relax. Pt has agreed to taking the ativan.

## 2019-10-13 ENCOUNTER — ANESTHESIA EVENT (OUTPATIENT)
Dept: ANTEPARTUM | Age: 34
End: 2019-10-13
Payer: COMMERCIAL

## 2019-10-13 ENCOUNTER — ANESTHESIA (OUTPATIENT)
Dept: ANTEPARTUM | Age: 34
End: 2019-10-13
Payer: COMMERCIAL

## 2019-10-13 PROBLEM — Z34.90 ENCOUNTER FOR INDUCTION OF LABOR: Status: ACTIVE | Noted: 2019-10-13

## 2019-10-13 LAB
ARTERIAL PATENCY WRIST A: ABNORMAL
ARTERIAL PATENCY WRIST A: ABNORMAL
BASE DEFICIT BLD-SCNC: 6 MMOL/L
BASE DEFICIT BLD-SCNC: 7 MMOL/L
BDY SITE: ABNORMAL
BDY SITE: ABNORMAL
BODY TEMPERATURE: 98.6
BODY TEMPERATURE: 98.6
CO2 BLD-SCNC: 21 MMOL/L
CO2 BLD-SCNC: 25 MMOL/L
COLLECT TIME,HTIME: 1310
COLLECT TIME,HTIME: 1310
GAS FLOW.O2 O2 DELIVERY SYS: ABNORMAL L/MIN
GAS FLOW.O2 O2 DELIVERY SYS: ABNORMAL L/MIN
HCO3 BLD-SCNC: 23.1 MMOL/L (ref 22–26)
HCO3 BLDV-SCNC: 20.1 MMOL/L (ref 23–28)
MAGNESIUM SERPL-MCNC: 3.8 MG/DL (ref 1.8–2.4)
PCO2 BLDCO: 41 MMHG (ref 32–68)
PCO2 BLDCO: 67 MMHG (ref 32–68)
PH BLDCO: 7.15 [PH] (ref 7.15–7.38)
PH BLDCO: 7.3 [PH] (ref 7.15–7.38)
PO2 BLDCO: 18 MMHG
PO2 BLDCO: 28 MMHG
SAO2 % BLD: 17 % (ref 95–98)
SAO2 % BLDV: 46 % (ref 65–88)
SERVICE CMNT-IMP: ABNORMAL
SERVICE CMNT-IMP: ABNORMAL
SPECIMEN TYPE: ABNORMAL
SPECIMEN TYPE: ABNORMAL

## 2019-10-13 PROCEDURE — 74011250637 HC RX REV CODE- 250/637: Performed by: OBSTETRICS & GYNECOLOGY

## 2019-10-13 PROCEDURE — 82803 BLOOD GASES ANY COMBINATION: CPT

## 2019-10-13 PROCEDURE — 75410000003 HC RECOV DEL/VAG/CSECN EA 0.5 HR

## 2019-10-13 PROCEDURE — 74011250636 HC RX REV CODE- 250/636

## 2019-10-13 PROCEDURE — 36415 COLL VENOUS BLD VENIPUNCTURE: CPT

## 2019-10-13 PROCEDURE — 77030014125 HC TY EPDRL BBMI -B: Performed by: ANESTHESIOLOGY

## 2019-10-13 PROCEDURE — 77010026065 HC OXYGEN MINIMUM MEDICAL AIR

## 2019-10-13 PROCEDURE — 77030003028 HC SUT VCRL J&J -A

## 2019-10-13 PROCEDURE — 74011250636 HC RX REV CODE- 250/636: Performed by: OBSTETRICS & GYNECOLOGY

## 2019-10-13 PROCEDURE — A4300 CATH IMPL VASC ACCESS PORTAL: HCPCS | Performed by: ANESTHESIOLOGY

## 2019-10-13 PROCEDURE — C1713 ANCHOR/SCREW BN/BN,TIS/BN: HCPCS

## 2019-10-13 PROCEDURE — 75410000002 HC LABOR FEE PER 1 HR

## 2019-10-13 PROCEDURE — 0UQMXZZ REPAIR VULVA, EXTERNAL APPROACH: ICD-10-PCS | Performed by: OBSTETRICS & GYNECOLOGY

## 2019-10-13 PROCEDURE — 74011000250 HC RX REV CODE- 250

## 2019-10-13 PROCEDURE — 76060000078 HC EPIDURAL ANESTHESIA

## 2019-10-13 PROCEDURE — 83735 ASSAY OF MAGNESIUM: CPT

## 2019-10-13 PROCEDURE — 88307 TISSUE EXAM BY PATHOLOGIST: CPT

## 2019-10-13 PROCEDURE — 74011000258 HC RX REV CODE- 258

## 2019-10-13 PROCEDURE — 65270000029 HC RM PRIVATE

## 2019-10-13 PROCEDURE — 75410000000 HC DELIVERY VAGINAL/SINGLE

## 2019-10-13 PROCEDURE — 77030018846 HC SOL IRR STRL H20 ICUM -A

## 2019-10-13 RX ORDER — ROPIVACAINE HYDROCHLORIDE 2 MG/ML
INJECTION, SOLUTION EPIDURAL; INFILTRATION; PERINEURAL
Status: DISCONTINUED | OUTPATIENT
Start: 2019-10-13 | End: 2019-10-13 | Stop reason: HOSPADM

## 2019-10-13 RX ORDER — SODIUM CHLORIDE 900 MG/100ML
INJECTION INTRAVENOUS
Status: COMPLETED
Start: 2019-10-13 | End: 2019-10-13

## 2019-10-13 RX ORDER — ONDANSETRON 4 MG/1
4 TABLET, ORALLY DISINTEGRATING ORAL
Status: ACTIVE | OUTPATIENT
Start: 2019-10-13 | End: 2019-10-14

## 2019-10-13 RX ORDER — SIMETHICONE 80 MG
80 TABLET,CHEWABLE ORAL
Status: DISCONTINUED | OUTPATIENT
Start: 2019-10-13 | End: 2019-10-16 | Stop reason: HOSPADM

## 2019-10-13 RX ORDER — DOCUSATE SODIUM 100 MG/1
100 CAPSULE, LIQUID FILLED ORAL 2 TIMES DAILY
Status: DISCONTINUED | OUTPATIENT
Start: 2019-10-13 | End: 2019-10-16 | Stop reason: HOSPADM

## 2019-10-13 RX ORDER — ROPIVACAINE HYDROCHLORIDE 2 MG/ML
INJECTION, SOLUTION EPIDURAL; INFILTRATION; PERINEURAL AS NEEDED
Status: DISCONTINUED | OUTPATIENT
Start: 2019-10-13 | End: 2019-10-13 | Stop reason: HOSPADM

## 2019-10-13 RX ORDER — LIDOCAINE HYDROCHLORIDE AND EPINEPHRINE 15; 5 MG/ML; UG/ML
INJECTION, SOLUTION EPIDURAL
Status: COMPLETED | OUTPATIENT
Start: 2019-10-13 | End: 2019-10-13

## 2019-10-13 RX ORDER — OXYTOCIN/RINGER'S LACTATE 30/500 ML
0-20 PLASTIC BAG, INJECTION (ML) INTRAVENOUS
Status: DISCONTINUED | OUTPATIENT
Start: 2019-10-13 | End: 2019-10-16

## 2019-10-13 RX ORDER — LORAZEPAM 0.5 MG/1
1 TABLET ORAL
Status: DISCONTINUED | OUTPATIENT
Start: 2019-10-13 | End: 2019-10-16 | Stop reason: HOSPADM

## 2019-10-13 RX ORDER — MAGNESIUM SULFATE HEPTAHYDRATE 40 MG/ML
4 INJECTION, SOLUTION INTRAVENOUS ONCE
Status: DISCONTINUED | OUTPATIENT
Start: 2019-10-13 | End: 2019-10-13 | Stop reason: SDUPTHER

## 2019-10-13 RX ORDER — OXYCODONE HYDROCHLORIDE 5 MG/1
10 TABLET ORAL
Status: DISCONTINUED | OUTPATIENT
Start: 2019-10-13 | End: 2019-10-16 | Stop reason: HOSPADM

## 2019-10-13 RX ORDER — MAGNESIUM SULFATE HEPTAHYDRATE 40 MG/ML
4 INJECTION, SOLUTION INTRAVENOUS ONCE
Status: COMPLETED | OUTPATIENT
Start: 2019-10-13 | End: 2019-10-13

## 2019-10-13 RX ORDER — OXYCODONE HYDROCHLORIDE 5 MG/1
5 TABLET ORAL
Status: DISCONTINUED | OUTPATIENT
Start: 2019-10-13 | End: 2019-10-16 | Stop reason: HOSPADM

## 2019-10-13 RX ORDER — PENICILLIN G POTASSIUM 5000000 [IU]/1
INJECTION, POWDER, FOR SOLUTION INTRAMUSCULAR; INTRAVENOUS
Status: COMPLETED
Start: 2019-10-13 | End: 2019-10-13

## 2019-10-13 RX ORDER — DIPHENHYDRAMINE HCL 25 MG
25 CAPSULE ORAL
Status: DISCONTINUED | OUTPATIENT
Start: 2019-10-13 | End: 2019-10-16 | Stop reason: HOSPADM

## 2019-10-13 RX ORDER — EPHEDRINE SULFATE 50 MG/ML
INJECTION, SOLUTION INTRAVENOUS AS NEEDED
Status: DISCONTINUED | OUTPATIENT
Start: 2019-10-13 | End: 2019-10-13 | Stop reason: HOSPADM

## 2019-10-13 RX ORDER — ACETAMINOPHEN 325 MG/1
650 TABLET ORAL
Status: DISCONTINUED | OUTPATIENT
Start: 2019-10-13 | End: 2019-10-16 | Stop reason: HOSPADM

## 2019-10-13 RX ADMIN — EPHEDRINE SULFATE 10 MG: 50 INJECTION, SOLUTION INTRAVENOUS at 10:50

## 2019-10-13 RX ADMIN — MISOPROSTOL 50 MCG: 100 TABLET ORAL at 04:48

## 2019-10-13 RX ADMIN — HYDRALAZINE HYDROCHLORIDE 10 MG: 20 INJECTION INTRAMUSCULAR; INTRAVENOUS at 09:09

## 2019-10-13 RX ADMIN — OXYTOCIN 2 MILLI-UNITS/MIN: 10 INJECTION, SOLUTION INTRAMUSCULAR; INTRAVENOUS at 09:25

## 2019-10-13 RX ADMIN — ROPIVACAINE HYDROCHLORIDE 5 ML: 2 INJECTION, SOLUTION EPIDURAL; INFILTRATION; PERINEURAL at 10:45

## 2019-10-13 RX ADMIN — HYDRALAZINE HYDROCHLORIDE 10 MG: 20 INJECTION INTRAMUSCULAR; INTRAVENOUS at 16:04

## 2019-10-13 RX ADMIN — DOCUSATE SODIUM 100 MG: 100 CAPSULE, LIQUID FILLED ORAL at 16:53

## 2019-10-13 RX ADMIN — ACETAMINOPHEN 1000 MG: 500 TABLET, FILM COATED ORAL at 15:48

## 2019-10-13 RX ADMIN — OXYCODONE HYDROCHLORIDE 10 MG: 5 TABLET ORAL at 20:20

## 2019-10-13 RX ADMIN — PENICILLIN G POTASSIUM 2.5 MILLION UNITS: 20000000 POWDER, FOR SOLUTION INTRAVENOUS at 06:09

## 2019-10-13 RX ADMIN — PENICILLIN G POTASSIUM 2.5 MILLION UNITS: 20000000 POWDER, FOR SOLUTION INTRAVENOUS at 09:25

## 2019-10-13 RX ADMIN — MAGNESIUM SULFATE HEPTAHYDRATE 4 G: 40 INJECTION, SOLUTION INTRAVENOUS at 10:25

## 2019-10-13 RX ADMIN — FAMOTIDINE 20 MG: 20 TABLET ORAL at 10:27

## 2019-10-13 RX ADMIN — ROPIVACAINE HYDROCHLORIDE 8 ML/HR: 2 INJECTION, SOLUTION EPIDURAL; INFILTRATION; PERINEURAL at 10:46

## 2019-10-13 RX ADMIN — FAMOTIDINE 20 MG: 20 TABLET ORAL at 19:55

## 2019-10-13 RX ADMIN — LABETALOL HYDROCHLORIDE 600 MG: 200 TABLET, FILM COATED ORAL at 06:05

## 2019-10-13 RX ADMIN — ROPIVACAINE HYDROCHLORIDE 5 ML: 2 INJECTION, SOLUTION EPIDURAL; INFILTRATION; PERINEURAL at 10:43

## 2019-10-13 RX ADMIN — LIDOCAINE HYDROCHLORIDE AND EPINEPHRINE 3 ML: 15; 5 INJECTION, SOLUTION EPIDURAL at 10:42

## 2019-10-13 RX ADMIN — MAGNESIUM SULFATE HEPTAHYDRATE 2 G/HR: 40 INJECTION, SOLUTION INTRAVENOUS at 10:28

## 2019-10-13 RX ADMIN — MISOPROSTOL 50 MCG: 100 TABLET ORAL at 00:27

## 2019-10-13 RX ADMIN — ZOLPIDEM TARTRATE 5 MG: 5 TABLET ORAL at 22:01

## 2019-10-13 RX ADMIN — PENICILLIN G POTASSIUM 5 MILLION UNITS: 5000000 POWDER, FOR SOLUTION INTRAMUSCULAR; INTRAPLEURAL; INTRATHECAL; INTRAVENOUS at 01:49

## 2019-10-13 RX ADMIN — LORAZEPAM 1 MG: 0.5 TABLET ORAL at 19:55

## 2019-10-13 RX ADMIN — SODIUM CHLORIDE 100 ML: 900 INJECTION, SOLUTION INTRAVENOUS at 01:49

## 2019-10-13 RX ADMIN — OXYCODONE HYDROCHLORIDE 5 MG: 5 TABLET ORAL at 15:48

## 2019-10-13 RX ADMIN — SERTRALINE HYDROCHLORIDE 100 MG: 50 TABLET ORAL at 16:53

## 2019-10-13 NOTE — PROGRESS NOTES
Shift assessment complete. POC discussed with patient. Patient denies H/A, blurred vision, and epigastric pain. Patient denies any bleeding or LOF. Denies any contractions, reports good fetal movement. Patient sister in room with her. Encouraged to call out with any needs, call light in reach.

## 2019-10-13 NOTE — ANESTHESIA POSTPROCEDURE EVALUATION
* No procedures listed *.    epidural    Anesthesia Post Evaluation        Patient location during evaluation: floor  Patient participation: complete - patient participated  Level of consciousness: awake and alert  Pain management: adequate  Airway patency: patent  Anesthetic complications: no  Cardiovascular status: acceptable  Respiratory status: acceptable  Hydration status: acceptable  Post anesthesia nausea and vomiting:  none      No vitals data found for the desired time range.

## 2019-10-13 NOTE — PROGRESS NOTES
Labor Progress Note  Patient seen, fetal heart rate and contraction pattern evaluated, patient examined. Patient Vitals for the past 1 hrs:   BP Temp Pulse   10/13/19 0817 (!) 159/97 98.3 °F (36.8 °C) 68   10/13/19 0748 (!) 158/102  62       Physical Exam:  Cervical Exam:  2 cm dilated    70% effaced    -3 station    Membranes:  Artificial Rupture of Membranes; Amniotic Fluid: medium amount of clear fluid  Uterine Activity: None  Fetal Heart Rate: Reactive    Assessment/Plan:  Reassuring fetal status, Continue plan for vaginal delivery, Chronic HTMN with superimpossed PreE, stable now, no clonus, will start Mag once in active labor unless BP issues return or sx. Continue starting the Pitocin. the PCN, getting the CHRISTIANNE early.

## 2019-10-13 NOTE — PROGRESS NOTES
10/13/19 0028   Cervical Exam   Dilation (cm) 1   Eff 70 %   Station -3   Position Posterior   Cervical Consistency Moderate   Vaginal exam done byRonn Kamara, NURY   Cervix unchanged from previous exam. 50 mcg cytotec given buccal as ordered. Pt denies pain or discomfort. 0031 Up to void. Encouraged to call out PRN. Pt voiced understanding.

## 2019-10-13 NOTE — LACTATION NOTE
Lactation visit. Recent delivery. NCU infant, 34 weeks. 2nd baby for mom. Did breastfeed for first baby x 6 weeks. History of gastric bypass and breast lift. Mom did have good milk supply with first she reports. Room full of visitors now. Mom does want to pump and provide breastmilk for infant. Reviewed benefits of breastmilk for  infant and benefit of pumping initiation as soon after delivery as possible. Mom declined pumping at this time but will soon. Pump set up at bedside. Mom encouraged to call out for [facility] or RN when ready to begin pumping for instruction on pump and expectations. Mom agreeable. Lactation to continue to follow.

## 2019-10-13 NOTE — ANESTHESIA PROCEDURE NOTES
Epidural Block    Start time: 10/13/2019 10:34 AM  End time: 10/13/2019 10:45 AM  Performed by: Khoa Novoa MD  Authorized by: Khoa Novoa MD     Pre-Procedure  Indication: at surgeon's request and labor epidural    Preanesthetic Checklist: patient identified, risks and benefits discussed, anesthesia consent, site marked, patient being monitored, timeout performed and anesthesia consent    Timeout Time: 10:49        Epidural:   Patient position:  Seated  Prep region:  Lumbar  Location:  L3-4    Needle and Epidural Catheter:   Needle Type:  Tuohy  Injection Technique:  Loss of resistance using saline  Attempts:  1  Catheter Size:  19 G  Catheter at Skin Depth (cm):  9.5 (JOHANNY at 6.5 cm)  Events: no blood with aspiration, no cerebrospinal fluid with aspiration, no paresthesia and negative aspiration test    Test Dose:  Negative    Assessment:   Catheter Secured:  Tegaderm and tape  Insertion:  Uncomplicated  Patient tolerance:  Patient tolerated the procedure well with no immediate complications

## 2019-10-13 NOTE — ANESTHESIA PREPROCEDURE EVALUATION
Relevant Problems   No relevant active problems       Anesthetic History   No history of anesthetic complications            Review of Systems / Medical History  Patient summary reviewed and pertinent labs reviewed    Pulmonary            Asthma (as a child)        Neuro/Psych         Psychiatric history (depression)     Cardiovascular    Hypertension (cHtn with superimposed preE)              Exercise tolerance: >4 METS     GI/Hepatic/Renal  Within defined limits              Endo/Other  Within defined limits           Other Findings   Comments: Hb 11  Plts 278           Physical Exam    Airway  Mallampati: II  TM Distance: 4 - 6 cm  Neck ROM: normal range of motion   Mouth opening: Normal     Cardiovascular  Regular rate and rhythm,  S1 and S2 normal,  no murmur, click, rub, or gallop             Dental  No notable dental hx       Pulmonary  Breath sounds clear to auscultation               Abdominal         Other Findings            Anesthetic Plan    ASA: 2  Anesthesia type: epidural            Anesthetic plan and risks discussed with: Patient

## 2019-10-13 NOTE — PROGRESS NOTES
Morning assessment complete as noted  Patient reports positive fetal movement, denies vaginal bleeding, uterine contractions, or leakage of fluid  Patient reports headache and states\"it is the headache that I have had for weeks now, no change in that\" No visual disturbance or epigastric pain reported  BBS clear and equal in all  lobes, DTR's +2/+2, clonus absent bilaterally  POC discussed patient verbalizes understanding patient to call RN prn needs

## 2019-10-13 NOTE — PROGRESS NOTES
Labor Progress Note  Patient seen, fetal heart rate and contraction pattern evaluated, patient examined. Patient Vitals for the past 1 hrs:   BP Pulse   10/13/19 1159 (!) 154/102 67       Physical Exam:  Cervical Exam:  5 cm dilated    100% effaced    -2 station    Membranes:  Artificial Rupture of Membranes; Amniotic Fluid: medium amount of clear fluid  Uterine Activity: Intensity: moderate  Fetal Heart Rate: Reactive  Decelerations: variable    Assessment/Plan:  Reassuring fetal status, Labor  Progressing normally  Continue expectant management, discussed the varialbes, reassures FHT, change of position and pitocion off, discussed watching as progressing, if loses the reassurance, need for c/s.

## 2019-10-13 NOTE — L&D DELIVERY NOTE
Delivery Note    Obstetrician:  Ángel Mota MD    Assistant: none    Pre-Delivery Diagnosis:  pregnancy <37 weeks, Induced labor, Single fetus and Pregnancy complicated by: Chronic HTN with development of preeclampsia. Post-Delivery Diagnosis: Living  infant(s), Female and named Prasanth Alvarado    Intrapartum Event: Abruption, Extended fetal bradycardia, Multiple variable decelerations and Preeclampsia, Severe    Procedure: Spontaneous vaginal delivery    Complications:  hypertension           Cord Blood Results:   Information for the patient's :   [[id number]]   No results found for: PCTABR, PCTDIG, BILI, ABORH    Prenatal Labs:     Lab Results   Component Value Date/Time    ABO/Rh(D) O POSITIVE 10/11/2019 02:06 PM    GrBStrep, External positive 2018 05:02 PM      Drop in  [address], was Complete, room and SCN notified,  Once arranged, control delivery, moderate blood with delivery, cord around body and neck reduced once baby completely delivered. Cord milking and cut and pasted off to SCN. Placenta intact,  Nodule 2-3 cm and edge c/w abruption. Sent to pathology. Right periurethral teat and repair. Attending Attestation: I was present and scrubbed for the entire procedure         Delivery Summary    Patient: Shikha Iglesias MRN: [id number]  SSN: [id number]    YOB: 1985  Age: 36 y.o. Sex: female       Information for the patient's :   [[id number]]       Labor Events:    Labor: No    Steroids: Full Course   Cervical Ripening Date/Time:       Cervical Ripening Type: Misoprostol   Antibiotics During Labor: Yes   Rupture Identifier:      Rupture Date/Time:       Rupture Type: AROM   Amniotic Fluid Volume:  Moderate    Amniotic Fluid Description: Clear    Amniotic Fluid Odor: None    Induction: Oxytocin;AROM       Induction Date/Time:        Indications for Induction: Hypertension;Severe Preeclampsia    Augmentation: None   Augmentation Date/Time:      Indications for Augmentation:     Labor complications: Abruptio Placenta       Additional complications:        Delivery Events:  Indications For Episiotomy:     Episiotomy: Midline;None   Perineal Laceration(s): None   Repaired:     Periurethral Laceration Location: right    Repaired: Yes   Labial Laceration Location:     Repaired:     Sulcal Laceration Location:     Repaired:     Vaginal Laceration Location:     Repaired:     Cervical Laceration Location:     Repaired:     Repair Suture: Rapide 3-0   Number of Repair Packets: 1   Estimated Blood Loss (ml): 300ml     Delivery Date: 10/13/2019    Delivery Time: 1:10 PM  Delivery Type: Vaginal, Spontaneous  Sex:  Female    Gestational Age: 34w0d   Delivery Clinician:  Jose Song  Living Status: Living   Delivery Location: L&D 438          APGARS  One minute Five minutes Ten minutes   Skin color: 0   1        Heart rate: 2   2        Grimace: 2   2        Muscle tone: 2   2        Breathin   2        Totals: 8   9            Presentation: Vertex    Position: Left Occiput Anterior  Resuscitation Method:  Suctioning-bulb; Tactile Stimulation     Meconium Stained: None      Cord Information: 3 Vessels  Complications: None;Nuchal Cord With Compressions  Cord around:    Delayed cord clamping? Yes  Cord clamped date/time:10/13/2019  1:10 PM  Disposition of Cord Blood: Lab    Blood Gases Sent?: Yes    Placenta:  Date/Time: 10/13/2019  1:14 PM  Removal: Spontaneous      Appearance: Abnormal;Other (comment)      Measurements:  Birth Weight: 1.835 kg      Birth Length:        Head Circumference:        Chest Circumference:       Abdominal Girth:       Other Providers:   Almaz Prabhakar L;GABRIELA JOHNSON;;IDANIA GIBSON;BRUCE CHADWICK;NICOLE PHELPS, Obstetrician;Primary Nurse;Primary [place] Nurse;Respiratory Therapist;Neonatologist;Charge Nurse           Group B Strep:   Lab Results Component Value Date/Time    GrBStrep, External positive 2018 05:02 PM     Information for the patient's :  Lizet Aguero [[id number]]   No results found for: ABORH, PCTABR, PCTDIG, BILI, ABORHEXT, [address] Joseph    Recent Labs     10/13/19  1339 10/13/19  1337   PCO2CB 41 67   PO2CB 28 18   HCO3I  --  23.1   SO2I  --  17*   IBD 6 7   PTEMPI 98.6 98.6   SPECTI VENOUS CORD ARTERIAL CORD   PHICB 7.299 7.147*   ISITE CORD CORD   IDEV ROOM AIR ROOM AIR   IALLEN NOT APPLICABLE NOT APPLICABLE

## 2019-10-13 NOTE — PROGRESS NOTES
MD updated on  SVE 1/75/-3. Orders for another dose of cytotec 50 mcg per Dr. Silva Christine. Mag to be started once in labor or if BP is significantly elevated per protocol.

## 2019-10-13 NOTE — PROGRESS NOTES
Pt awake with visitors at bedside and using phone. Labetalol given per MAR.  Will reassess BP.      10/13/19 0605   Maternal Vital Signs   Pulse (Heart Rate) (!) 59   BP (!) 169/105

## 2019-10-14 PROCEDURE — 74011250636 HC RX REV CODE- 250/636: Performed by: OBSTETRICS & GYNECOLOGY

## 2019-10-14 PROCEDURE — 65270000029 HC RM PRIVATE

## 2019-10-14 PROCEDURE — 74011250637 HC RX REV CODE- 250/637: Performed by: OBSTETRICS & GYNECOLOGY

## 2019-10-14 RX ORDER — NIFEDIPINE 30 MG/1
30 TABLET, EXTENDED RELEASE ORAL DAILY
Status: DISCONTINUED | OUTPATIENT
Start: 2019-10-14 | End: 2019-10-14

## 2019-10-14 RX ORDER — NIFEDIPINE 30 MG/1
30 TABLET, EXTENDED RELEASE ORAL 2 TIMES DAILY
Status: DISCONTINUED | OUTPATIENT
Start: 2019-10-14 | End: 2019-10-15

## 2019-10-14 RX ADMIN — FAMOTIDINE 20 MG: 20 TABLET ORAL at 18:28

## 2019-10-14 RX ADMIN — OXYCODONE HYDROCHLORIDE 10 MG: 5 TABLET ORAL at 07:57

## 2019-10-14 RX ADMIN — DOCUSATE SODIUM 100 MG: 100 CAPSULE, LIQUID FILLED ORAL at 08:59

## 2019-10-14 RX ADMIN — NIFEDIPINE 30 MG: 30 TABLET, FILM COATED, EXTENDED RELEASE ORAL at 20:21

## 2019-10-14 RX ADMIN — MAGNESIUM SULFATE HEPTAHYDRATE 2 G/HR: 40 INJECTION, SOLUTION INTRAVENOUS at 02:06

## 2019-10-14 RX ADMIN — OXYCODONE HYDROCHLORIDE AND ACETAMINOPHEN 2 TABLET: 5; 325 TABLET ORAL at 21:11

## 2019-10-14 RX ADMIN — DOCUSATE SODIUM 100 MG: 100 CAPSULE, LIQUID FILLED ORAL at 18:28

## 2019-10-14 RX ADMIN — NIFEDIPINE 30 MG: 30 TABLET, FILM COATED, EXTENDED RELEASE ORAL at 10:26

## 2019-10-14 RX ADMIN — ACETAMINOPHEN 650 MG: 325 TABLET, FILM COATED ORAL at 15:56

## 2019-10-14 RX ADMIN — LORAZEPAM 1 MG: 0.5 TABLET ORAL at 07:57

## 2019-10-14 RX ADMIN — FAMOTIDINE 20 MG: 20 TABLET ORAL at 08:59

## 2019-10-14 RX ADMIN — OXYCODONE HYDROCHLORIDE 10 MG: 5 TABLET ORAL at 15:54

## 2019-10-14 RX ADMIN — POLYETHYLENE GLYCOL (3350) 17 G: 17 POWDER, FOR SOLUTION ORAL at 21:21

## 2019-10-14 RX ADMIN — WITCH HAZEL 1 PAD: 500 SOLUTION RECTAL; TOPICAL at 11:19

## 2019-10-14 RX ADMIN — LORAZEPAM 1 MG: 0.5 TABLET ORAL at 21:11

## 2019-10-14 RX ADMIN — SERTRALINE HYDROCHLORIDE 100 MG: 50 TABLET ORAL at 08:58

## 2019-10-14 RX ADMIN — Medication 10 ML: at 21:25

## 2019-10-14 RX ADMIN — POLYETHYLENE GLYCOL (3350) 17 G: 17 POWDER, FOR SOLUTION ORAL at 07:57

## 2019-10-14 NOTE — PROGRESS NOTES
Placed call for Dr. Araceli Benavides due to patient's elevated BP. Visit Vitals  BP (!) 155/107 (BP 1 Location: Right arm, BP Patient Position: At rest)   Pulse 76   Temp 97.9 °F (36.6 °C)   Resp 16   Ht 5' 6\" (1.676 m)   Wt 94.8 kg (209 lb)   SpO2 97%   Breastfeeding? Unknown   BMI 33.73 kg/m²     Received telephone orders with verbal readback to start Procardia 30mg BID and to call MD if BP >160/110.

## 2019-10-14 NOTE — PROGRESS NOTES
Chart reviewed - patient with history of depression. Baby girl Angelo Levine) currently in NICU due to 34 week gestation.  attempted to meet with patient - patient continued on mag this AM; patient in the process of transferring from L&D to MIU during second attempt.  will meet with patient tomorrow.     JAROD Mathew   [phone]

## 2019-10-14 NOTE — PROGRESS NOTES
Progress Note                               Patient: Sri Schumacher MRN: [id number]  SSN: [id number]    YOB: 1985  Age: 36 y.o. Sex: female      Postpartum Day Number 1    Subjective:     Patient doing well postpartum without significant complaints. Voiding without difficulty. Patient reports normal lochia. . Breastfeeding: Yes-pumping  Baby in SCN (34 weeks) delivered due to [facility] with superimposed preeclampsia. No HA, visual changes, RUQ pain. Objective:     Patient Vitals for the past 18 hrs:   Temp Pulse Resp BP SpO2   10/14/19 0910 97.5 °F (36.4 °C) 85  (!) 145/92    10/14/19 0843  93  (!) 145/96    10/14/19 0743  75  (!) 169/92    10/14/19 0643  89  144/90    10/14/19 0548 98 °F (36.7 °C) 80 16 148/84    10/14/19 0443  79  120/75    10/14/19 0343  85  (!) 129/91    10/14/19 0243  83  132/76    10/13/19 2343  97  121/71    10/13/19 2243  86  (!) 146/99 98 %   10/13/19 2043  78  130/75    10/13/19 1931  98  138/87    10/13/19 1900  87  (!) 167/95    10/13/19 1648 98.3 °F (36.8 °C) 88  145/75    10/13/19 1643  93  146/81    10/13/19 1638  85  146/85    10/13/19 1633  88  152/88    10/13/19 1628  100  (!) 166/98    10/13/19 1602 98.2 °F (36.8 °C) 91  (!) 179/97    10/13/19 1543  80  (!) 155/102         Temp (24hrs), Av.1 °F (36.7 °C), Min:97.5 °F (36.4 °C), Max:98.3 °F (36.8 °C)      Physical Exam:    Patient without distress. Abdomen: soft, nontender  Fundus: firm and non tender  Lower Extremities:  - Edema No   - No evidence of DVT seen on physical exam.   - Patellar Reflexes: 1+ bilaterally   - Clonus: absent    Lab/Data Review:  CBC:    Recent Labs     10/12/19  0553 10/11/19  1406   WBC 8.9 12.7*   HGB 11.0* 11.1*   HCT 34.5* 34.0*    280       Assessment and Plan:     Patient appears to be having an uncomplicated postpartum course. Continue routine perineal care and maternal education.   -CHTN with superimposed preeclampisa- currently on Magnesium for recovery. Start Procardia 30XL.   Normal labs on admission.   - baby in SCN  - pumping  -Rh pos, GBS unknown (received PCN), RI

## 2019-10-14 NOTE — PROGRESS NOTES
SBAR IN Report: Mother    Verbal report received from Jadyn Frederick RN on this patient, who is now being transferred from L&D (unit) for routine progression of care. The patient is not wearing a green \"Anesthesia-Duramorph\" band. Report consisted of patient's Situation, Background, Assessment and Recommendations (SBAR).  ID bands were compared with the identification form, and verified with the patient and transferring nurse. Information from the SBAR and Procedure Summary and the [place]ay Report was reviewed with the transferring nurse; opportunity for questions and clarification provided.

## 2019-10-14 NOTE — LACTATION NOTE
In to follow up with mom. She stated that she pumped and was able to express 7 ml. encouarged her to pump at least every 3 hours. Discussed need for a pump at discharge. She had to [employer] and is to get a personal pump. She is planning to call them back to inform that infant delivered early. Instructed to also ask if it covers a rental pump. She also wanted to purchase a hands free bra. I fitted her and demonstrated to her how to use it. She has no other concerns at this time.

## 2019-10-14 NOTE — PROGRESS NOTES
SBAR OUT Report: Mother    Verbal report given to Gurpreet Riuz RN  on this patient, who is now being transferred to [facility]  for routine progression of care. The patient is not wearing a green \"Anesthesia-Duramorph\" band. Report consisted of patient's Situation, Background, Assessment and Recommendations (SBAR). [place] ID bands were compared with the identification form, and verified with the patient and receiving nurse. Information from the SBAR and the [facility] Report was reviewed with the receiving nurse; opportunity for questions and clarification provided.

## 2019-10-15 PROCEDURE — 90471 IMMUNIZATION ADMIN: CPT

## 2019-10-15 PROCEDURE — 74011250637 HC RX REV CODE- 250/637: Performed by: OBSTETRICS & GYNECOLOGY

## 2019-10-15 PROCEDURE — 90686 IIV4 VACC NO PRSV 0.5 ML IM: CPT | Performed by: OBSTETRICS & GYNECOLOGY

## 2019-10-15 PROCEDURE — 65270000029 HC RM PRIVATE

## 2019-10-15 PROCEDURE — 74011250636 HC RX REV CODE- 250/636: Performed by: OBSTETRICS & GYNECOLOGY

## 2019-10-15 PROCEDURE — 90707 MMR VACCINE SC: CPT | Performed by: OBSTETRICS & GYNECOLOGY

## 2019-10-15 RX ORDER — NIFEDIPINE 30 MG/1
60 TABLET, EXTENDED RELEASE ORAL 2 TIMES DAILY
Status: DISCONTINUED | OUTPATIENT
Start: 2019-10-15 | End: 2019-10-16 | Stop reason: HOSPADM

## 2019-10-15 RX ADMIN — DOCUSATE SODIUM 100 MG: 100 CAPSULE, LIQUID FILLED ORAL at 16:14

## 2019-10-15 RX ADMIN — OXYCODONE HYDROCHLORIDE AND ACETAMINOPHEN 2 TABLET: 5; 325 TABLET ORAL at 16:14

## 2019-10-15 RX ADMIN — OXYCODONE HYDROCHLORIDE AND ACETAMINOPHEN 2 TABLET: 5; 325 TABLET ORAL at 21:48

## 2019-10-15 RX ADMIN — POLYETHYLENE GLYCOL (3350) 17 G: 17 POWDER, FOR SOLUTION ORAL at 09:58

## 2019-10-15 RX ADMIN — MEASLES, MUMPS, AND RUBELLA VIRUS VACCINE LIVE 0.5 ML: 1000; 12500; 1000 INJECTION, POWDER, LYOPHILIZED, FOR SUSPENSION SUBCUTANEOUS at 16:17

## 2019-10-15 RX ADMIN — SERTRALINE HYDROCHLORIDE 100 MG: 50 TABLET ORAL at 09:57

## 2019-10-15 RX ADMIN — ZOLPIDEM TARTRATE 5 MG: 5 TABLET ORAL at 21:57

## 2019-10-15 RX ADMIN — NIFEDIPINE 60 MG: 30 TABLET, FILM COATED, EXTENDED RELEASE ORAL at 09:58

## 2019-10-15 RX ADMIN — OXYCODONE HYDROCHLORIDE AND ACETAMINOPHEN 2 TABLET: 5; 325 TABLET ORAL at 09:58

## 2019-10-15 RX ADMIN — INFLUENZA VIRUS VACCINE 0.5 ML: 15; 15; 15; 15 SUSPENSION INTRAMUSCULAR at 16:17

## 2019-10-15 RX ADMIN — POLYETHYLENE GLYCOL (3350) 17 G: 17 POWDER, FOR SOLUTION ORAL at 21:48

## 2019-10-15 RX ADMIN — FAMOTIDINE 20 MG: 20 TABLET ORAL at 09:58

## 2019-10-15 RX ADMIN — DOCUSATE SODIUM 100 MG: 100 CAPSULE, LIQUID FILLED ORAL at 09:58

## 2019-10-15 RX ADMIN — LORAZEPAM 1 MG: 0.5 TABLET ORAL at 11:06

## 2019-10-15 RX ADMIN — NIFEDIPINE 60 MG: 30 TABLET, FILM COATED, EXTENDED RELEASE ORAL at 21:12

## 2019-10-15 RX ADMIN — LORAZEPAM 1 MG: 0.5 TABLET ORAL at 16:42

## 2019-10-15 RX ADMIN — FAMOTIDINE 20 MG: 20 TABLET ORAL at 16:14

## 2019-10-15 RX ADMIN — OXYCODONE HYDROCHLORIDE AND ACETAMINOPHEN 2 TABLET: 5; 325 TABLET ORAL at 03:37

## 2019-10-15 NOTE — LACTATION NOTE
Lactation visit. Infant in NCU, stable. Mom pumping diligently. Averaging about 5ml per pumping. Had recently pumped and taken milk to NCU. Baby also latching some as well per mom. Has latched x3 per mom. Encouraged mom to latch baby and do breast play as often as allowed. Continue to pump every 3 hours even if baby latching some to protect and promote milk production. Mom agreeable. Mom anticipates insurance pump will arrive tomorrow. Will plan to use hospital pump at infants bedside when visiting and her personal pump at home. Declined need for pump rental at this time but knows rental option available.

## 2019-10-15 NOTE — PROGRESS NOTES
SW assessment. Franklyn Sotomayor in NICU due to prematurity (34w).  met with patient. Patient feels that she's coping well with baby's prematurity and need to be in the NICU. Patient does not have any NICU experience with first child, but states that her  had a child in the NICU at [address] for 18 months due to extreme prematurity. Patient has an 13 m/o daughter at home and confirms experiencing postpartum depression with her. Patient was placed on Zoloft & Wellbutrin postpartum. Additionally, patient has a history of generalized depression/anxiety. Patient is currently on Zoloft. She has not pursued therapy in the past, but is open to this now. Discussed patient's support system, which consists of FOB/, friends, and family. Per patient, \"I have a village. \"       provided education and literature on support available thru [employer] (PSI). PSI Warmline:  9-192-464-4PPD (7037). WWW. POSTPARTUM. NET    Family was informed of signs/symptoms, forms of intervention (medication, counseling, education), and resources (local coordinators available telephonically, monthly support group in [place], weekly \"chat with expert\" phone sessions). Additionally, patient was provided with [facility] [place] Checklist.\"      Discussed importance of self-care and accepting help when offered. Family was encouraged to contact me with any questions/needs -  contact information provided.       JAROD Saavedra  [facility]   [phone]

## 2019-10-15 NOTE — PROGRESS NOTES
Postpartum Progress Note (VD)    Patient: Sri Schumacher MRN: [id number]  SSN: [id number]    YOB: 1985  Age: 36 y.o. Sex: female      Subjective:     Postpartum Day: 2     Delivery: Spontaneous vaginal delivery    The patient feels well. The patient denies emotional concerns. Pain is  well controlled with current medications. Urinary output is good. Voiding spontaneous. The patient is ambulating well. The patient is tolerating a normal diet. Flatus has been passed. She reports HA yesterday, but none today. She denies visual changes and RUQ pain. The baby is well, still in SCN. Objective:      Patient Vitals for the past 24 hrs:   Temp Pulse Resp BP SpO2   10/15/19 0256 97.6 °F (36.4 °C) 70 18 (!) 142/92 96 %   10/14/19 2315 97.8 °F (36.6 °C) 63 18 (!) 146/93 96 %   10/14/19 2112  68  (!) 147/97    10/14/19 1936 97.9 °F (36.6 °C) 76 16 (!) 155/107 97 %   10/14/19 1557  81 18 (!) 142/92    10/14/19 1510 98.5 °F (36.9 °C) 85 18 (!) 145/95 96 %   10/14/19 1026  77  (!) 157/107    10/14/19 0910 97.5 °F (36.4 °C) 85  (!) 145/92    10/14/19 0843  93  (!) 145/96      General:    alert, cooperative, no distress   Bowel Sounds:  active   Lochia:  appropriate   Uterine Fundus:   firm   Fundus Location:  -2   Perineum:  not inspected   DVT Evaluation:  No evidence of DVT seen on physical exam. No edema, 1+ DTRs     Lab/Data Review: All lab results for the last 24 hours reviewed. Assessment:     Status post: Postpartum vaginal delivery complicated by Preeclampsia with severe features     Plan:     - Postpartum care discussed including diet, ambulation, and actvitiy restrictions. - Preeclampsia with severe features: No symptoms today. S/p 24hr pp Mag. Bps not optimized. Procardia XL increased from 30mg to 60mg BID. Continue inpatient BP monitoring.    - Discharge planning for PPD #3 if BP control improved.     Kilo Engel MD

## 2019-10-16 VITALS
BODY MASS INDEX: 31.72 KG/M2 | TEMPERATURE: 98.4 F | OXYGEN SATURATION: 97 % | RESPIRATION RATE: 18 BRPM | WEIGHT: 197.4 LBS | SYSTOLIC BLOOD PRESSURE: 144 MMHG | HEART RATE: 110 BPM | HEIGHT: 66 IN | DIASTOLIC BLOOD PRESSURE: 95 MMHG

## 2019-10-16 PROCEDURE — 74011250637 HC RX REV CODE- 250/637: Performed by: OBSTETRICS & GYNECOLOGY

## 2019-10-16 RX ORDER — SERTRALINE HYDROCHLORIDE 100 MG/1
100 TABLET, FILM COATED ORAL DAILY
Qty: 30 TAB | Refills: 3 | Status: SHIPPED | OUTPATIENT
Start: 2019-10-17

## 2019-10-16 RX ORDER — NIFEDIPINE 30 MG/1
60 TABLET, EXTENDED RELEASE ORAL 2 TIMES DAILY
Qty: 60 TAB | Refills: 2 | Status: SHIPPED | OUTPATIENT
Start: 2019-10-16

## 2019-10-16 RX ORDER — OXYCODONE AND ACETAMINOPHEN 5; 325 MG/1; MG/1
1-2 TABLET ORAL
Qty: 20 TAB | Refills: 0 | Status: SHIPPED | OUTPATIENT
Start: 2019-10-16 | End: 2019-10-30

## 2019-10-16 RX ADMIN — SERTRALINE HYDROCHLORIDE 100 MG: 50 TABLET ORAL at 09:09

## 2019-10-16 RX ADMIN — FAMOTIDINE 20 MG: 20 TABLET ORAL at 09:09

## 2019-10-16 RX ADMIN — DOCUSATE SODIUM 100 MG: 100 CAPSULE, LIQUID FILLED ORAL at 09:09

## 2019-10-16 RX ADMIN — LORAZEPAM 1 MG: 0.5 TABLET ORAL at 04:12

## 2019-10-16 RX ADMIN — OXYCODONE HYDROCHLORIDE AND ACETAMINOPHEN 2 TABLET: 5; 325 TABLET ORAL at 04:04

## 2019-10-16 RX ADMIN — NIFEDIPINE 60 MG: 30 TABLET, FILM COATED, EXTENDED RELEASE ORAL at 09:09

## 2019-10-16 NOTE — DISCHARGE SUMMARY
Obstetrical Discharge Summary     Name: Denita Gaucher MRN: [id number]  SSN: [id number]    YOB: 1985  Age: 36 y.o. Sex: female      Allergies: Patient has no known allergies. Admit Date: 10/11/2019    Discharge Date: 10/16/2019     Admitting Physician: Ju Cottrell MD     Attending Physician:  Nilsa Jim MD     * Admission Diagnoses: Hypertension in pregnancy, antepartum [O16.9]  Hypertension in pregnancy [O16.9]    * Discharge Diagnoses:   Information for the patient's :  Alisha Bangura [[id number]]   Delivery of a 1.835 kg female infant via Vaginal, Spontaneous on 10/13/2019 at 1:10 PM  by Ju Cottrell. Apgars were 8  and 9 .        Additional Diagnoses:   Hospital Problems as of 10/16/2019 Date Reviewed: 10/13/2019          Codes Class Noted - Resolved POA     (spontaneous vaginal delivery) ICD-10-CM: O80  ICD-9-CM: 650  10/13/2019 - Present Unknown        Encounter for induction of labor ICD-10-CM: Z34.90  ICD-9-CM: V22.1  10/13/2019 - Present Unknown        Hypertension in pregnancy ICD-10-CM: O16.9  ICD-9-CM: 642.90  10/12/2019 - Present Unknown        * (Principal) Hypertension in pregnancy, antepartum ICD-10-CM: O16.9  ICD-9-CM: 642.93  10/11/2019 - Present Unknown        Positive GBS test ICD-10-CM: B95.1  ICD-9-CM: 041.02  10/11/2019 - Present Unknown    Overview Signed 10/11/2019  3:52 PM by James Mosqueda MD     On 19 at [address] [facility] Results   Component Value Date/Time    ABO/Rh(D) Micky Clock POSITIVE 10/11/2019 02:06 PM    GrBStrep, External positive 2018 05:02 PM      Immunization History   Administered Date(s) Administered    Influenza Vaccine (Quad) PF 10/15/2019    MMR 2018, 10/15/2019    Tdap 2018, 2019       * Procedures:   * No surgery found *      Veronika Radhika  Depression Scale  I have been able to laugh and see the funny side of things: As much as I always could  I have looked forward with enjoyment to things: As much as I ever did  I have blamed myself unnecessarily when things went wrong: Yes, some of the time  I have been anxious or worried for no good reason: Yes, sometimes  I have felt scared or panicky for no very good reason: No, not much  Things have been getting on top of me: Yes, sometimes I haven't been coping as well as usual  I have been so unhappy that I have had difficulty sleeping: Not very often  I have felt sad or miserable: Not very often  I have been so unhappy that I have been crying: Only occasionally  The thought of harming myself has occurred to me: Never  Total Score: 10    * Discharge Condition: good and stable    * Hospital Course: Postpartum course was complicated by hypertension, which added 1 days to the patient's length of stay. * Disposition: Home    Discharge Medications:   Current Discharge Medication List      START taking these medications    Details   oxyCODONE-acetaminophen (PERCOCET) 5-325 mg per tablet Take 1-2 Tabs by mouth every six (6) hours as needed for Pain for up to 14 days. Max Daily Amount: 8 Tabs. Qty: 20 Tab, Refills: 0    Associated Diagnoses:  (spontaneous vaginal delivery)         CONTINUE these medications which have CHANGED    Details   sertraline (ZOLOFT) 100 mg tablet Take 1 Tab by mouth daily. Qty: 30 Tab, Refills: 3         CONTINUE these medications which have NOT CHANGED    Details   PNV66-Iron Fumarate-FA-DSS-DHA 26-1.2- mg cap Take  by mouth. STOP taking these medications       labetalol (NORMODYNE) 300 mg tablet Comments:   Reason for Stopping:         ondansetron (ZOFRAN ODT) 8 mg disintegrating tablet Comments:   Reason for Stopping:               * Follow-up Care/Patient Instructions:   Activity: Activity as tolerated  Diet: Regular Diet  Wound Care: As directed    Follow-up Information     Follow up With Specialties Details Why Windy Randall., MD Family Practice   [address] [place] [address]  [phone]                                                   Post-Partum Day Number 2 Progress/Discharge Note    Patient doing well post-partum without significant complaint. Voiding without difficulty, normal lochia, positive flatus. Vitals:    Patient Vitals for the past 8 hrs:   BP Temp Pulse Resp SpO2 Weight   10/16/19 0714 (!) 132/95 97.7 °F (36.5 °C) 91 16 97 %    10/16/19 0512      89.5 kg (197 lb 6.4 oz)   10/16/19 0406 (!) 138/99 97.5 °F (36.4 °C) (!) 106  96 %      Temp (24hrs), Av °F (36.7 °C), Min:97.5 °F (36.4 °C), Max:98.5 °F (36.9 °C)      Vital signs stable, afebrile. Exam:  Patient without distress. Abdomen soft, fundus firm at level of umbilicus, non tender               Perineum with normal lochia noted. Lower extremities are negative for swelling, cords or tenderness. Lab/Data Review: All lab results for the last 24 hours reviewed. Assessment and Plan:  Patient appears to be having uncomplicated post-partum course. Continue routine perineal care and maternal education. Plan discharge for today with follow up in our office in 6 weeks.

## 2019-10-16 NOTE — LACTATION NOTE
In to see mom prior to discharge to home. She stated that her insurance company overnighted her personal breast pump to her and she does not need to rent a breast pump. Instructed her to take her breast pump kit to the nursery and leave it in her infant's room so she can pump when she is here visiting. Lactation consultant to follow up as needed.

## 2019-10-16 NOTE — DISCHARGE INSTRUCTIONS
Patient Education   Patient Education        Vaginal Childbirth: Care Instructions  Your Care Instructions    Your body will slowly heal in the next few weeks. It is easy to get too tired and overwhelmed during the first weeks after your baby is born. Changes in your hormones can shift your mood without warning. You may find it hard to meet the extra demands on your energy and time. Take it easy on yourself. Follow-up care is a key part of your treatment and safety. Be sure to make and go to all appointments, and call your doctor if you are having problems. It's also a good idea to know your test results and keep a list of the medicines you take. How can you care for yourself at home? · Vaginal bleeding and cramps  ? After delivery, you will have a bloody discharge from the vagina. This will turn pink within a week and then white or yellow after about 10 days. It may last for 2 to 4 weeks or longer, until the uterus has healed. Use pads instead of tampons until you stop bleeding. ? Do not worry if you pass some blood clots, as long as they are smaller than a golf ball. If you have a tear or stitches in your vaginal area, change the pad at least every 4 hours to prevent soreness and infection. ? You may have cramps for the first few days after childbirth. These are normal and occur as the uterus shrinks to normal size. Take an over-the-counter pain medicine, such as acetaminophen (Tylenol), ibuprofen (Advil, Motrin), or naproxen (Aleve), for cramps. Read and follow all instructions on the label. Do not take aspirin, because it can cause more bleeding. ? Do not take two or more pain medicines at the same time unless the doctor told you to. Many pain medicines have acetaminophen, which is Tylenol. Too much acetaminophen (Tylenol) can be harmful. · Stitches  ? If you have stitches, they will dissolve on their own and do not need to be removed.  Follow your doctor's instructions for cleaning the stitched area.  ? Put ice or a cold pack on your painful area for 10 to 20 minutes at a time, several times a day, for the first few days. Put a thin cloth between the ice and your skin. ? Sit in a few inches of warm water (sitz bath) 3 times a day and after bowel movements. The warm water helps with pain and itching. If you do not have a tub, a warm shower might help. · Breast fullness  ? Your breasts may overfill (engorge) in the first few days after delivery. To help milk flow and to relieve pain, warm your breasts in the shower or by using warm, moist towels before nursing. ? If you are not nursing, do not put warmth on your breasts or touch your breasts. Wear a tight bra or sports bra and use ice until the fullness goes away. This usually takes 2 to 3 days. ? Put ice or a cold pack on your breast after nursing to reduce swelling and pain. Put a thin cloth between the ice and your skin. · Activity  ? Eat a balanced diet. Do not try to lose weight by cutting calories. Keep taking your prenatal vitamins, or take a multivitamin. ? Get as much rest as you can. Try to take naps when your baby sleeps during the day. ? Get some exercise every day. But do not do any heavy exercise until your doctor says it is okay. ? Wait until you are healed (about 4 to 6 weeks) before you have sexual intercourse. Your doctor will tell you when it is okay to have sex. ? Talk to your doctor about birth control. You can get pregnant even before your period returns. Also, you can get pregnant while you are breastfeeding. · Mental health  ? It is normal to have some sadness, anxiety, sleeplessness, and mood swings after you go home. If you feel upset or hopeless for more than a few days or are having trouble doing the things you need to do, talk to your doctor. · Constipation and hemorrhoids  ? Drink plenty of fluids, enough so that your urine is light yellow or clear like water.  If you have kidney, heart, or liver disease and have to limit fluids, talk with your doctor before you increase the amount of fluids you drink. ? Eat plenty of fiber each day. Have a bran muffin or bran cereal for breakfast, and try eating a piece of fruit for a mid-afternoon snack. ? For painful, itchy hemorrhoids, put ice or a cold pack on the area several times a day for 10 minutes at a time. Follow this by putting a warm compress on the area for another 10 to 20 minutes or by sitting in a shallow, warm bath. When should you call for help? Call 911 anytime you think you may need emergency care. For example, call if:    · You have thoughts of harming yourself, your baby, or another person.     · You passed out (lost consciousness).     · You have chest pain, are short of breath, or cough up blood.     · You have a seizure.    Call your doctor now or seek immediate medical care if:    · You have severe vaginal bleeding.     · You are dizzy or lightheaded, or you feel like you may faint.     · You have a fever.     · You have new or more pain in your belly or pelvis.     · You have symptoms of a blood clot in your leg (called a deep vein thrombosis), such as:  ? Pain in the calf, back of the knee, thigh, or groin. ? Redness and swelling in your leg or groin.     · You have signs of preeclampsia, such as:  ? Sudden swelling of your face, hands, or feet. ? New vision problems (such as dimness, blurring, or seeing spots). ? A severe headache.    Watch closely for changes in your health, and be sure to contact your doctor if:    · Your vaginal bleeding seems to be getting heavier.     · You have new or worse vaginal discharge.     · You feel sad, anxious, or hopeless for more than a few days.     · You do not get better as expected. Where can you learn more? Go to http://kyle-gabrielle.info/. Enter K404 in the search box to learn more about \"Vaginal Childbirth: Care Instructions. \"  Current as of: May 29, 2019  Content Version: 12.2  © 9548-5540 [employer], Incorporated. Care instructions adapted under license by [employer] (which disclaims liability or warranty for this information). If you have questions about a medical condition or this instruction, always ask your healthcare professional. [address] any warranty or liability for your use of this information. After Your Delivery (the Postpartum Period): Care Instructions  Your Care Instructions    Congratulations on the birth of your baby. Like pregnancy, the  period can be a time of excitement, senia, and exhaustion. You may look at your wondrous little baby and feel happy. You may also be overwhelmed by your new sleep hours and new responsibilities. At first, babies often sleep during the days and are awake at night. They do not have a pattern or routine. They may make sudden gasps, jerk themselves awake, or look like they have crossed eyes. These are all normal, and they may even make you smile. In these first weeks after delivery, try to take good care of yourself. It may take 4 to 6 weeks to feel like yourself again, and possibly longer if you had a  birth. You will likely feel very tired for several weeks. Your days will be full of ups and downs, but lots of senia as well. Follow-up care is a key part of your treatment and safety. Be sure to make and go to all appointments, and call your doctor if you are having problems. It's also a good idea to know your test results and keep a list of the medicines you take. How can you care for yourself at home? Take care of your body after delivery  · Use pads instead of tampons for the bloody flow that may last as long as 2 weeks. · Ease cramps with ibuprofen (Advil, Motrin). · Ease soreness of hemorrhoids and the area between your vagina and rectum with ice compresses or witch hazel pads. · Ease constipation by drinking lots of fluid and eating high-fiber foods.  Ask your doctor about over-the-counter stool softeners. · Cleanse yourself with a gentle squeeze of warm water from a bottle instead of wiping with toilet paper. · Take a sitz bath in warm water several times a day. · Wear a good nursing bra. Ease sore and swollen breasts with warm, wet washcloths. · If you are not breastfeeding, use ice rather than heat for breast soreness. · Your period may not start for several months if you are breastfeeding. You may bleed more, and longer at first, than you did before you got pregnant. · Wait until you are healed (about 4 to 6 weeks) before you have sexual intercourse. Your doctor will tell you when it is okay to have sex. · Try not to travel with your baby for 5 or 6 weeks. If you take a long car trip, make frequent stops to walk around and stretch. Avoid exhaustion  · Rest every day. Try to nap when your baby naps. · Ask another adult to be with you for a few days after delivery. · Plan for  if you have other children. · Stay flexible so you can eat at odd hours and sleep when you need to. Both you and your baby are making new schedules. · Plan small trips to get out of the house. Change can make you feel less tired. · Ask for help with housework, cooking, and shopping. Remind yourself that your job is to care for your baby. Know about help for postpartum depression  · \"Baby blues\" are common for the first 1 to 2 weeks after birth. You may cry or feel sad or irritable for no reason. · Rest whenever you can. Being tired makes it harder to handle your emotions. · Go for walks with your baby. · Talk to your partner, friends, and family about your feelings. · If your symptoms last for more than a few weeks, or if you feel very depressed, ask your doctor for help. · Postpartum depression can be treated. Support groups and counseling can help. Sometimes medicine can also help. Stay healthy  · Eat healthy foods so you have more energy and lose extra baby pounds.   · If you breastfeed, avoid drugs. If you quit smoking during pregnancy, try to stay smoke-free. If you choose to have a drink now and then, have only one drink, and limit the number of occasions that you have a drink. Wait to breastfeed at least 2 hours after you have a drink to reduce the amount of alcohol the baby may get in the milk. · Start daily exercise after 4 to 6 weeks, but rest when you feel tired. · Learn exercises to tone your belly. Do Kegel exercises to regain strength in your pelvic muscles. You can do these exercises while you stand or sit. ? Squeeze the same muscles you would use to stop your urine. Your belly and thighs should not move. ? Hold the squeeze for 3 seconds, and then relax for 3 seconds. ? Start with 3 seconds. Then add 1 second each week until you are able to squeeze for 10 seconds. ? Repeat the exercise 10 to 15 times for each session. Do three or more sessions each day. · Find a class for new mothers and new babies that has an exercise time. · If you had a  birth, give yourself a bit more time before you exercise, and be careful. When should you call for help? Call 911 anytime you think you may need emergency care. For example, call if:    · You have thoughts of harming yourself, your baby, or another person.     · You passed out (lost consciousness).     · You have chest pain, are short of breath, or cough up blood.     · You have a seizure.    Call your doctor now or seek immediate medical care if:    · You have severe vaginal bleeding. This means you are passing blood clots and soaking through a pad each hour for 2 or more hours.     · You are dizzy or lightheaded, or you feel like you may faint.     · You have a fever.     · You have new or more belly pain.     · You have signs of a blood clot in your leg (called a deep vein thrombosis), such as:  ? Pain in the calf, back of the knee, thigh, or groin. ?  Redness and swelling in your leg or groin.     · You have signs of preeclampsia, such as:  ? Sudden swelling of your face, hands, or feet. ? New vision problems (such as dimness, blurring, or seeing spots). ? A severe headache.    Watch closely for changes in your health, and be sure to contact your doctor if:    · Your vaginal bleeding seems to be getting heavier.     · You have new or worse vaginal discharge.     · You feel sad, anxious, or hopeless for more than a few days.     · You do not get better as expected. Where can you learn more? Go to http://kyle-gabrielle.info/. Enter A461 in the search box to learn more about \"After Your Delivery (the Postpartum Period): Care Instructions. \"  Current as of: May 29, 2019  Content Version: 12.2  © 8829-0043 [employer]. Care instructions adapted under license by [employer] (which disclaims liability or warranty for this information). If you have questions about a medical condition or this instruction, always ask your healthcare professional. [place] [address] any warranty or liability for your use of this information.

## 2019-10-16 NOTE — PROGRESS NOTES
EPDS = 10     met with patient on 10/15 (please see previous note). Lengthy conversation with patient about mental health history, current symptoms, as well as resources/supports. Patient currently on Zoloft. SW will continue to check-in with patient due to baby being in the NICU. OB notified of EPDS score via fax.     JAROD Edouard  [facility]   [phone]

## 2019-10-18 ENCOUNTER — HOSPITAL ENCOUNTER (OUTPATIENT)
Age: 34
Setting detail: OBSERVATION
Discharge: HOME | End: 2019-10-18
Attending: OBSTETRICS & GYNECOLOGY | Admitting: OBSTETRICS & GYNECOLOGY
Payer: COMMERCIAL

## 2019-10-18 VITALS
HEIGHT: 66 IN | RESPIRATION RATE: 20 BRPM | SYSTOLIC BLOOD PRESSURE: 151 MMHG | TEMPERATURE: 98.5 F | BODY MASS INDEX: 31.66 KG/M2 | DIASTOLIC BLOOD PRESSURE: 94 MMHG | WEIGHT: 197 LBS | HEART RATE: 96 BPM

## 2019-10-18 LAB
ALBUMIN SERPL-MCNC: 3.1 G/DL (ref 3.5–5)
ALBUMIN/GLOB SERPL: 0.7 {RATIO} (ref 1.2–3.5)
ALP SERPL-CCNC: 153 U/L (ref 50–136)
ALT SERPL-CCNC: 37 U/L (ref 12–65)
ANION GAP SERPL CALC-SCNC: 5 MMOL/L (ref 7–16)
AST SERPL-CCNC: 27 U/L (ref 15–37)
BASOPHILS # BLD: 0 K/UL (ref 0–0.2)
BASOPHILS NFR BLD: 0 % (ref 0–2)
BILIRUB DIRECT SERPL-MCNC: 0.1 MG/DL
BILIRUB SERPL-MCNC: 0.3 MG/DL (ref 0.2–1.1)
BUN SERPL-MCNC: 10 MG/DL (ref 6–23)
CALCIUM SERPL-MCNC: 8.5 MG/DL (ref 8.3–10.4)
CHLORIDE SERPL-SCNC: 107 MMOL/L (ref 98–107)
CO2 SERPL-SCNC: 24 MMOL/L (ref 21–32)
CREAT SERPL-MCNC: 0.72 MG/DL (ref 0.6–1)
DIFFERENTIAL METHOD BLD: ABNORMAL
EOSINOPHIL # BLD: 0.1 K/UL (ref 0–0.8)
EOSINOPHIL NFR BLD: 2 % (ref 0.5–7.8)
ERYTHROCYTE [DISTWIDTH] IN BLOOD BY AUTOMATED COUNT: 13.6 % (ref 11.9–14.6)
GLOBULIN SER CALC-MCNC: 4.4 G/DL (ref 2.3–3.5)
GLUCOSE SERPL-MCNC: 119 MG/DL (ref 65–100)
HCT VFR BLD AUTO: 38.6 % (ref 35.8–46.3)
HGB BLD-MCNC: 12.3 G/DL (ref 11.7–15.4)
IMM GRANULOCYTES # BLD AUTO: 0.1 K/UL (ref 0–0.5)
IMM GRANULOCYTES NFR BLD AUTO: 1 % (ref 0–5)
LDH SERPL L TO P-CCNC: 328 U/L (ref 100–190)
LYMPHOCYTES # BLD: 2.2 K/UL (ref 0.5–4.6)
LYMPHOCYTES NFR BLD: 25 % (ref 13–44)
MCH RBC QN AUTO: 27.3 PG (ref 26.1–32.9)
MCHC RBC AUTO-ENTMCNC: 31.9 G/DL (ref 31.4–35)
MCV RBC AUTO: 85.6 FL (ref 79.6–97.8)
MONOCYTES # BLD: 0.6 K/UL (ref 0.1–1.3)
MONOCYTES NFR BLD: 7 % (ref 4–12)
NEUTS SEG # BLD: 5.8 K/UL (ref 1.7–8.2)
NEUTS SEG NFR BLD: 66 % (ref 43–78)
NRBC # BLD: 0 K/UL (ref 0–0.2)
PLATELET # BLD AUTO: 330 K/UL (ref 150–450)
PMV BLD AUTO: 9.2 FL (ref 9.4–12.3)
POTASSIUM SERPL-SCNC: 3.5 MMOL/L (ref 3.5–5.1)
PROT SERPL-MCNC: 7.5 G/DL (ref 6.3–8.2)
RBC # BLD AUTO: 4.51 M/UL (ref 4.05–5.2)
SODIUM SERPL-SCNC: 136 MMOL/L (ref 136–145)
URATE SERPL-MCNC: 5.1 MG/DL (ref 2.6–6)
WBC # BLD AUTO: 8.8 K/UL (ref 4.3–11.1)

## 2019-10-18 PROCEDURE — 36415 COLL VENOUS BLD VENIPUNCTURE: CPT

## 2019-10-18 PROCEDURE — 85025 COMPLETE CBC W/AUTO DIFF WBC: CPT

## 2019-10-18 PROCEDURE — 80048 BASIC METABOLIC PNL TOTAL CA: CPT

## 2019-10-18 PROCEDURE — 80076 HEPATIC FUNCTION PANEL: CPT

## 2019-10-18 PROCEDURE — 74011250637 HC RX REV CODE- 250/637: Performed by: OBSTETRICS & GYNECOLOGY

## 2019-10-18 PROCEDURE — 99282 EMERGENCY DEPT VISIT SF MDM: CPT | Performed by: OBSTETRICS & GYNECOLOGY

## 2019-10-18 PROCEDURE — 83615 LACTATE (LD) (LDH) ENZYME: CPT

## 2019-10-18 PROCEDURE — 99218 HC RM OBSERVATION: CPT

## 2019-10-18 PROCEDURE — 84550 ASSAY OF BLOOD/URIC ACID: CPT

## 2019-10-18 RX ORDER — SODIUM CHLORIDE 0.9 % (FLUSH) 0.9 %
5-40 SYRINGE (ML) INJECTION AS NEEDED
Status: DISCONTINUED | OUTPATIENT
Start: 2019-10-18 | End: 2019-10-18 | Stop reason: HOSPADM

## 2019-10-18 RX ORDER — NIFEDIPINE 10 MG/1
20 CAPSULE ORAL ONCE
Status: COMPLETED | OUTPATIENT
Start: 2019-10-18 | End: 2019-10-18

## 2019-10-18 RX ORDER — SODIUM CHLORIDE 0.9 % (FLUSH) 0.9 %
5-40 SYRINGE (ML) INJECTION EVERY 8 HOURS
Status: DISCONTINUED | OUTPATIENT
Start: 2019-10-18 | End: 2019-10-18 | Stop reason: HOSPADM

## 2019-10-18 RX ORDER — NIFEDIPINE 10 MG/1
20 CAPSULE ORAL
Status: DISCONTINUED | OUTPATIENT
Start: 2019-10-18 | End: 2019-10-18

## 2019-10-18 RX ORDER — LORAZEPAM 1 MG/1
1 TABLET ORAL
Status: DISCONTINUED | OUTPATIENT
Start: 2019-10-18 | End: 2019-10-18

## 2019-10-18 RX ADMIN — NIFEDIPINE 20 MG: 10 CAPSULE ORAL at 13:26

## 2019-10-18 NOTE — PROGRESS NOTES
met with patient in [facility] - being seen for high blood pressure. Patient very tearful and expressed current anxiety with panic attacks. Patient with history of both generalized and postpartum depression/anxiety. Patient currently on Zoloft. Emotional support offered. Discussed importance of self-care as well as receiving adequate sleep/nutrition. Patient states that her  will be out of town for the next 9 days. Her mother is coming into town, but only to help out for the weekend. After that, patient will be by herself. Discussed possibility of hiring aide to assist with routine chores at home. Patient feels that this an option financially. Information provided on local home health agency that provides this support. Additionally, information provided on local therapists that accept [facility] and have specialized training in  mood and anxiety disorders.  will continue to follow and provide support. Patient has this 's contact information for any needs/questions.       YULIA Whitehead-MENDY  [facility]   [phone]

## 2019-10-18 NOTE — DISCHARGE INSTRUCTIONS
Patient Education   Lorazepam (By mouth)   Lorazepam (iye-VB-w-satnam)  Treats anxiety. Brand Name(s): Ativan, LORazepam Intensol   There may be other brand names for this medicine. When This Medicine Should Not Be Used: This medicine is not right for everyone. Do not use it if you had an allergic reaction to lorazepam or similar medicines, or you are pregnant or breastfeeding, or you have acute narrow-angle glaucoma. How to Use This Medicine:   Liquid, Tablet  · Take your medicine as directed. Your dose may need to be changed several times to find what works best for you. · Oral liquid:   ¨ Measure the oral liquid medicine with a marked measuring spoon, oral syringe, or medicine cup. ¨ Mix the medicine with water, juice, soda, applesauce, or pudding. Drink or eat the mixture right away. Do not store it for later use. · This medicine should come with a Medication Guide. Ask your pharmacist for a copy if you do not have one. · Missed dose: Take a dose as soon as you remember. If you are more than 1 hour late, skip the missed dose and wait until it is time for your next dose. Do not use extra medicine to make up for a missed dose. ·   ¨ Oral liquid: Refrigerate the oral liquid. Throw away an opened bottle after 90 days. ¨ Tablets: Store the medicine in a closed container at room temperature, away from heat, moisture, and direct light. Drugs and Foods to Avoid:   Ask your doctor or pharmacist before using any other medicine, including over-the-counter medicines, vitamins, and herbal products. · Some medicines can affect how lorazepam works. Tell your doctor if you are using any of the following:   ¨ Aminophylline, clozapine, probenecid, theophylline, valproate  ¨ Medicine to treat depression or mental health problems  ¨ Medicine to treat seizures  · Do not drink alcohol while you are using this medicine. · Tell your doctor if you use anything else that makes you sleepy.  Some examples are allergy medicine, narcotic pain medicine, and alcohol. Warnings While Using This Medicine:   · It is not safe to take this medicine during pregnancy. It could harm an unborn baby. Tell your doctor right away if you become pregnant. · Tell your doctor if you have kidney disease, liver disease, lung or breathing problems (such as COPD, sleep apnea), or a history of drug or alcohol abuse, depression, or seizures. · This medicine can be habit-forming. Do not use more than your prescribed dose. Call your doctor if you think your medicine is not working. · Do not stop using this medicine suddenly. Your doctor will need to slowly decrease your dose before you stop it completely. · This medicine may make you drowsy. Do not drive or do anything else that could be dangerous until you know how this medicine affects you. · Your doctor will do lab tests at regular visits to check on the effects of this medicine. Keep all appointments. · Keep all medicine out of the reach of children. Never share your medicine with anyone. Possible Side Effects While Using This Medicine:   Call your doctor right away if you notice any of these side effects:  · Allergic reaction: Itching or hives, swelling in your face or hands, swelling or tingling in your mouth or throat, chest tightness, trouble breathing  · Confusion, unusual mood or behavior, thoughts of hurting yourself  · Seizures  · Severe drowsiness or weakness, slow heartbeat, trouble breathing  · Worsening of depression  If you notice these less serious side effects, talk with your doctor:   · Dizziness, clumsiness  If you notice other side effects that you think are caused by this medicine, tell your doctor. Call your doctor for medical advice about side effects. You may report side effects to FDA at 9-584-FDA-4260  © 2017 [facility] Information is for End User's use only and may not be sold, redistributed or otherwise used for commercial purposes.   The above information is an  only. It is not intended as medical advice for individual conditions or treatments. Talk to your doctor, nurse or pharmacist before following any medical regimen to see if it is safe and effective for you. Learning About Preeclampsia After Childbirth  What is preeclampsia? A woman with preeclampsia has blood pressure that is higher than usual. She may also have other serious symptoms. Preeclampsia can be dangerous. When it is severe, it can cause seizures (eclampsia) or liver or kidney damage. When the liver is affected, some women get HELLP syndrome, a blood-clotting and bleeding problem. HELLP can come on quickly and can be deadly. This is why your doctor checks you and your baby often. Preeclampsia usually occurs after 20 weeks of pregnancy. Most often, it starts near the end of pregnancy and goes away after childbirth. But symptoms may last a few weeks or more and can get worse after delivery. Rarely, symptoms of preeclampsia don't show up until days or even weeks after childbirth. What are the symptoms? Mild preeclampsia usually doesn't cause symptoms. But preeclampsia can cause rapid weight gain and sudden swelling of the hands and face. Severe preeclampsia does cause symptoms. It can cause a very bad headache and trouble seeing and breathing. It also can cause belly pain. You may also urinate less than usual.  If you have new preeclampsia symptoms after you go home from the hospital, call your doctor right away. What can you expect after you have had preeclampsia? In the hospital  After the baby and the placenta are delivered, preeclampsia usually starts to improve. Most women get better in the first few days after childbirth. After having preeclampsia, you still have a risk of seizures for a day or more after childbirth. (Very rarely, seizures happen later on.) So your doctor may have you take magnesium sulfate for a day or more to prevent seizures.  You may also take medicine to lower your blood pressure. When you go home  Your blood pressure will most likely return to normal a few days after delivery. Your doctor will want to check your blood pressure sometime in the first week after you leave the hospital.  Some women still have high blood pressure 6 weeks after childbirth. But most return to normal levels over the long term. · Take and record your blood pressure at home if your doctor tells you to. ? Learn the importance of the two measures of blood pressure (such as 120 over 80, or 120/80). The first number is the systolic pressure. This is the force of blood on the artery walls as the heart pumps. The second number is the diastolic pressure. This is the force of blood on the artery walls between heartbeats, when the heart is at rest. You have a choice of monitors to use. § Manual monitor: You pump up the cuff and use a stethoscope to listen for your pulse. § Electronic monitor: The cuff inflates, and a gauge shows your pulse rate. ? To take your blood pressure:  § Ask your doctor to check your blood pressure monitor to be sure that it is accurate and that the cuff fits you. Also ask your doctor to watch you use it, to make sure that you are using it right. § You should not eat, use tobacco products, or use medicine known to raise blood pressure (such as some nasal decongestant sprays) before you take your blood pressure. § Avoid taking your blood pressure if you have just exercised. Also avoid taking it if you are nervous or upset. Rest at least 15 minutes before you take your blood pressure. · Be safe with medicines. If you take medicine, take it exactly as prescribed. Call your doctor if you think you are having a problem with your medicine. · Do not smoke. Quitting smoking will help improve your baby's growth and health. If you need help quitting, talk to your doctor about stop-smoking programs and medicines.  These can increase your chances of quitting for good. · Eat a balanced and healthy diet that has lots of fruits and vegetables. Long-term health  After you have had preeclampsia, you have a higher-than-average risk of heart disease, stroke, and kidney disease. This may be because the same things that cause preeclampsia also cause heart and kidney disease. To protect your health, work with your doctor on living a heart-healthy lifestyle and getting the checkups you need. Your doctor may also want you to check your blood pressure at home. Follow-up care is a key part of your treatment and safety. Be sure to make and go to all appointments, and call your doctor if you are having problems. It's also a good idea to know your test results and keep a list of the medicines you take. Where can you learn more? Go to http://kyle-gabrielle.info/. Enter Q785 in the search box to learn more about \"Learning About Preeclampsia After Childbirth. \"  Current as of: May 29, 2019  Content Version: 12.2  © 5867-7670 [employer]. Care instructions adapted under license by [employer] (which disclaims liability or warranty for this information). If you have questions about a medical condition or this instruction, always ask your healthcare professional. [address] any warranty or liability for your use of this information.

## 2019-10-18 NOTE — PROGRESS NOTES
Pt to room OB2 for triage with chief complaint of elevated blood pressure. . Assessment begins, EFM and Peever applied to a soft non tender abdomin and tracing well. Pt had a vaginal delivery of a 34 week girl who is in the NCU. While visiting baby she c/o elevated B/P to the nurses who encouraged here to be checked in the [place]. Pt had an episode of visual disturbance this AM,  Has no headache. She has some chest pain that radiates to her back. Dr Willie Lou called to evaluate.

## 2019-10-18 NOTE — H&P
Chief Complaint: elevated BP      36 y.o. female  s/p  at 34 weeks EGA 6 days ago. Prenatal course c/b CHTN with [facility]. Pt was discharged from the hospital on procardia 30mg XL po bid. Pt took her BP this morning and it was noted to be elevated. Pt denies abdominal pain, CP, SOB, HA, or scotomata. Pt notes that her current life situation (a 17 month old child and a  in the NICU) is extremely stressful. She is being treated with zoloft for a h/o depression and anxiety. HISTORY:    Social History     Substance and Sexual Activity   Sexual Activity Not on file    Comment: Nexplanon     No LMP recorded.     Social History     Socioeconomic History    Marital status:      Spouse name: Not on file    Number of children: Not on file    Years of education: Not on file    Highest education level: Not on file   Occupational History    Not on file   Social Needs    Financial resource strain: Not on file    Food insecurity:     Worry: Not on file     Inability: Not on file    Transportation needs:     Medical: Not on file     Non-medical: Not on file   Tobacco Use    Smoking status: Former Smoker     Last attempt to quit: 2017     Years since quittin.7    Smokeless tobacco: Never Used   Substance and Sexual Activity    Alcohol use: Yes     Comment: 2 per night    Drug use: No    Sexual activity: Not on file     Comment: Nexplanon   Lifestyle    Physical activity:     Days per week: Not on file     Minutes per session: Not on file    Stress: Not on file   Relationships    Social connections:     Talks on phone: Not on file     Gets together: Not on file     Attends Jehovah's witness service: Not on file     Active member of club or organization: Not on file     Attends meetings of clubs or organizations: Not on file     Relationship status: Not on file    Intimate partner violence:     Fear of current or ex partner: Not on file     Emotionally abused: Not on file     Physically abused: Not on file     Forced sexual activity: Not on file   Other Topics Concern     Service Not Asked    Blood Transfusions Not Asked    Caffeine Concern Not Asked    Occupational Exposure Not Asked    Hobby Hazards Not Asked    Sleep Concern Not Asked    Stress Concern Not Asked    Weight Concern Not Asked    Special Diet Not Asked    Back Care Not Asked    Exercise Not Asked    Bike Helmet Not Asked   [address] Yes    Self-Exams Not Asked   Social History Narrative            , Reanna Vo)                Daughter, Janes Naval 7/2018        Denies physical or sexual abuse       Past Surgical History:   Procedure Laterality Date    BREAST SURGERY PROCEDURE UNLISTED      Breast lift    GASTRIC BYPASS,OBESE<150CM SHAYNA-EN-Y      HX GASTRIC BYPASS  2013    HX MASTOPEXY (BREAST LIFT)      HX ORTHOPAEDIC Right 2012    right leg - rods and pins    HX OTHER SURGICAL      Tummy Tuck    HX OTHER SURGICAL      Facial surgery x 2 after dog bite       Past Medical History:   Diagnosis Date    Abnormal Papanicolaou smear of cervix     LEEP 2003    Anemia     Asthma     childhood, does not use inhaler    Depression     Zoloft    Essential hypertension     Gestational hypertension     Iron deficiency anemia          ROS:  A 12 point review of symptoms negative except for chief complaint as described above. PHYSICAL EXAM:  Blood pressure (!) 148/102, temperature 98.5 °F (36.9 °C), resp. rate 20, height 5' 6\" (1.676 m), weight 89.4 kg (197 lb), unknown if currently breastfeeding. Constitutional: The patient appears well, alert, oriented x 3. Cardiovascular: Heart RRR, no murmurs.    Respiratory: Lungs clear, no respiratory distress  GI: Abdomen soft, nontender, no guarding  No fundal tenderness  Musculoskeletal: no cva tenderness  Lower ext: no edema, neg lindsay's, reflexes +2  Skin: no rashes or lesions  Psychiatric:Mood/ Affect: appropriate      I personally reviewed pt's medical record including relevant labs and ultrasounds  I reviewed the NST at today's encounter    Assessment/Plan:  Pt presents with postpartum BP elevation. Pt with a h/o CHTN and significant anxiety. Will obtain the PEC labs and administer ativan 1mg po now. Will re-evaluate after medicine given and the labs return.

## 2019-10-18 NOTE — LACTATION NOTE
This note was copied from a baby's chart. LC planned to meet with mom in [facility] but saw mom in hallway and talked to mom. Mom not doing well. Immediately tearful and states \"I am really struggling with postpartum and my blood pressure is still crazy high\". Mom reports she is home alone with other child, only 17 months old. Took older child to  and then drove herself to hospital to see baby in [facility]. Has no appetite, not pumping routinely. BP this morning at home was 158/95 and she has some visual disturbances as soon as she woke up. Has not called OB. Emotional support given to mom. Cautioned her that her BP was still very high and she need to reach out to OB immediately, could also go be see in [facility] ED. Mom hesitant. Strongly encouraged mom to seek medical advice regarding high BP and also feelings related to post partum depression.  aware and will follow up with her again in a few hours. SCN RN notified of discussed issues.

## 2019-10-18 NOTE — H&P
Pt remains asymptomatic. The PEC labs are normal. Will increase her procardia to 60mg XL po bid. Prescription given for Ativan 1 mg po bid (#10 with no refills). Pt discharged to home with HTN precautions. Pt to f/u with her PObP in 3-4 days for BP check. Management d/w Dr. Verna Irby ([facility]) who concurs.

## 2019-10-18 NOTE — PROGRESS NOTES
Verbal order to increase here procardia to 60 zl per day. Give ativan script to pt and have her follow up with her primary next week per Dr Denice Montes De Oca.

## 2019-10-22 ENCOUNTER — TELEPHONE (OUTPATIENT)
Dept: CASE MANAGEMENT | Age: 34
End: 2019-10-22

## 2019-10-22 NOTE — TELEPHONE ENCOUNTER
Patient referred to [facility] for evaluation/management of psychiatric medications. Office will contact patient to schedule initial appointment.     JAROD East  [place]   [phone]

## 2019-10-31 ENCOUNTER — TELEPHONE (OUTPATIENT)
Dept: CASE MANAGEMENT | Age: 34
End: 2019-10-31

## 2019-10-31 NOTE — TELEPHONE ENCOUNTER
Phone call to patient at [phone] due to EPDS score of 10 after delivery. No answer; VM full - unable to leave a message.     [place] JAROD Flores  [facility]   [phone]

## 2019-11-01 NOTE — PROGRESS NOTES
Phone call to patient at [phone] due to EPDS score of 10 after delivery.       No answer; message left.     Kelly Hern[address]dez [address]   [phone]

## 2019-11-18 ENCOUNTER — TELEPHONE (OUTPATIENT)
Dept: CASE MANAGEMENT | Age: 34
End: 2019-11-18

## 2019-11-18 NOTE — TELEPHONE ENCOUNTER
received e-mail from patient at 4:45pm on 11/15/19. Patient requesting name of therapist due to postpartum depression.  responded to patient to inform that I would call/e-mail her back on Monday (today). Phone call to patient at [phone] (no answer; message left requesting call back). E-mail sent to patient ([email or web address]) with name of recommended therapist (Shyam West).  asked for patient to call at her earliest convenience.     JAROD Groves  [facility]   [phone]

## 2019-11-20 ENCOUNTER — TELEPHONE (OUTPATIENT)
Dept: CASE MANAGEMENT | Age: 34
End: 2019-11-20

## 2019-12-06 ENCOUNTER — TELEPHONE (OUTPATIENT)
Dept: CASE MANAGEMENT | Age: 34
End: 2019-12-06

## 2019-12-06 NOTE — TELEPHONE ENCOUNTER
Several e-mails and 1 voicemail received from patient today. Phone call to patient at [phone]. Patient was provided the opportunity to share how things have been going since baby Rgoer Leach discharged from the NICU. Per patient, she and her  are having relationship difficulties. Additionally, she is concerned that she may be experiencing some postpartum depression/anxiety. Per patient, she has lost her health insurance. Discussed therapy options, such as [facility]. Patient denies any suicidal thoughts, but expressed symptoms of depression (not wanting to get out of bed, feeling overwhelmed). Patient is also facing financial difficulties as she is no longer working. Explored the option of patient and her daughters going to [facility] to stay with her mother. Patient is still taking her antidepressant, but has called her PCP about possibly adding Wellbutrin. Discussed behaviors that would support her mental wellbeing as well as behaviors that could negatively impact things. Emotional support provided by . Patient receptive to  calling back next week to check-in. Additionally, patient has this 's contact information for any additional needs/questions.       JAROD Lemons  [facility]   [phone]

## 2019-12-10 ENCOUNTER — TELEPHONE (OUTPATIENT)
Dept: CASE MANAGEMENT | Age: 34
End: 2019-12-10

## 2019-12-10 NOTE — TELEPHONE ENCOUNTER
Phone call to patient at [phone] to check-in. Patient states that she's \"on the angry side of things now. \"      Patient has not made progress in finding a counselor yet. She has called [facility] and has left a message. She missed her PCP appointment today to further discuss her antidepressant medications. Emotional support provided.  encouraged patient to continue to pursue mental health support as soon as possible. Patient denied any needs from  at this time. Patient has this 's contact information should any needs/questions arise.       YULIA Asher-MENDY  [facility]   [phone]

## 2020-01-02 ENCOUNTER — TELEPHONE (OUTPATIENT)
Dept: CASE MANAGEMENT | Age: 35
End: 2020-01-02

## 2020-01-02 NOTE — TELEPHONE ENCOUNTER
Phone call to patient at [phone] to check-in.       No answer; message left.     JAROD Rodriguez  [facility]   [phone]

## 2020-05-08 NOTE — PROGRESS NOTES
I was contacted by RN regarding persistence of headache despite Fioricet, Reglan, and Benadryl. Headache is currently 6/10. Patient has history of Constantino-en-y gastric bypass, so concern that pills may not be absorbing well. Oral soln oxycodone ordered for patient. BPs reviewed are still in mild range. She has normal reflexes, trace BL LE edema, and clear pulmonary exam. 24hr urine will be complete at 2pm. I asked nurse to initiate Mag 4/2 for seizure prophylaxis (in case HA is neurologic symptom of PreE), and I ordered head  MRA/V with contrast to look for other emergent sources of severe persistent HA. I contacted M, Dr. Martine Zhao to discuss patient, and she was in agreement with the above plan. She advised patient would need transfer if rules in for severe PreE at this early gestation.       Pete Dennison MD Retention Suture Bite Size: 3 mm

## 2023-11-28 NOTE — TELEPHONE ENCOUNTER
E-mail received from patient stating that she's feeling much better. She has the counselor's name for follow-up if needed.     YULIA Edouard-MENDY  [facility]   [phone]
patient

## 2024-09-23 NOTE — PROGRESS NOTES
"Chief Complaint   Patient presents with    Recheck Medication       Initial There were no vitals taken for this visit. Estimated body mass index is 37.5 kg/m  as calculated from the following:    Height as of 2/23/24: 1.905 m (6' 3\").    Weight as of 2/26/24: 136.1 kg (300 lb).  Medication Reconciliation: complete        " Pt presented to L&D triage complaining of increased BP and HA this AM.  Pt states she took Tylenol and it helped decreased pain to 1/10. EFM on.